# Patient Record
Sex: FEMALE | Race: WHITE | Employment: OTHER | URBAN - METROPOLITAN AREA
[De-identification: names, ages, dates, MRNs, and addresses within clinical notes are randomized per-mention and may not be internally consistent; named-entity substitution may affect disease eponyms.]

---

## 2018-01-09 PROBLEM — K22.719 BARRETT'S ESOPHAGUS WITH DYSPLASIA: Status: ACTIVE | Noted: 2018-01-09

## 2018-01-09 PROBLEM — K21.9 GASTROESOPHAGEAL REFLUX DISEASE WITHOUT ESOPHAGITIS: Status: ACTIVE | Noted: 2017-09-28

## 2018-01-09 PROBLEM — H40.9 GLAUCOMA OF RIGHT EYE: Status: ACTIVE | Noted: 2018-01-09

## 2018-01-09 PROBLEM — H35.30 MACULAR DEGENERATION OF LEFT EYE: Status: ACTIVE | Noted: 2018-01-09

## 2019-04-23 ENCOUNTER — HOSPITAL ENCOUNTER (OUTPATIENT)
Dept: LAB | Age: 84
Discharge: HOME | End: 2019-04-23

## 2019-04-23 PROCEDURE — 88305 TISSUE EXAM BY PATHOLOGIST: CPT

## 2019-04-23 PROCEDURE — 88312 SPECIAL STAINS GROUP 1: CPT

## 2022-01-26 ENCOUNTER — APPOINTMENT (OUTPATIENT)
Dept: GENERAL RADIOLOGY | Age: 87
DRG: 871 | End: 2022-01-26
Attending: STUDENT IN AN ORGANIZED HEALTH CARE EDUCATION/TRAINING PROGRAM
Payer: MEDICARE

## 2022-01-26 ENCOUNTER — APPOINTMENT (OUTPATIENT)
Dept: CT IMAGING | Age: 87
DRG: 871 | End: 2022-01-26
Attending: FAMILY MEDICINE
Payer: MEDICARE

## 2022-01-26 ENCOUNTER — HOSPITAL ENCOUNTER (INPATIENT)
Age: 87
LOS: 21 days | Discharge: HOME HEALTH | DRG: 871 | End: 2022-02-16
Attending: STUDENT IN AN ORGANIZED HEALTH CARE EDUCATION/TRAINING PROGRAM | Admitting: FAMILY MEDICINE
Payer: MEDICARE

## 2022-01-26 DIAGNOSIS — U07.1 ACUTE HYPOXEMIC RESPIRATORY FAILURE DUE TO COVID-19 (HCC): ICD-10-CM

## 2022-01-26 DIAGNOSIS — U07.1 COVID-19: Primary | ICD-10-CM

## 2022-01-26 DIAGNOSIS — R09.02 HYPOXIA: ICD-10-CM

## 2022-01-26 DIAGNOSIS — J96.01 ACUTE HYPOXEMIC RESPIRATORY FAILURE DUE TO COVID-19 (HCC): ICD-10-CM

## 2022-01-26 DIAGNOSIS — I10 ESSENTIAL (PRIMARY) HYPERTENSION: ICD-10-CM

## 2022-01-26 DIAGNOSIS — E78.2 MIXED HYPERLIPIDEMIA: ICD-10-CM

## 2022-01-26 DIAGNOSIS — R77.8 ELEVATED TROPONIN: ICD-10-CM

## 2022-01-26 PROBLEM — R74.01 TRANSAMINITIS: Status: ACTIVE | Noted: 2022-01-26

## 2022-01-26 PROBLEM — E83.42 HYPOMAGNESEMIA: Status: ACTIVE | Noted: 2022-01-26

## 2022-01-26 PROBLEM — Z79.899 CHRONIC PRESCRIPTION BENZODIAZEPINE USE: Status: ACTIVE | Noted: 2022-01-26

## 2022-01-26 PROBLEM — R94.31 PROLONGED Q-T INTERVAL ON ECG: Status: ACTIVE | Noted: 2022-01-26

## 2022-01-26 PROBLEM — G92.8 TOXIC METABOLIC ENCEPHALOPATHY: Status: ACTIVE | Noted: 2022-01-26

## 2022-01-26 PROBLEM — A41.89 SEPSIS DUE TO COVID-19 (HCC): Status: ACTIVE | Noted: 2022-01-26

## 2022-01-26 PROBLEM — E87.6 HYPOKALEMIA: Status: ACTIVE | Noted: 2022-01-26

## 2022-01-26 PROBLEM — D68.69 HYPERCOAGULABLE STATE ASSOCIATED WITH COVID-19 (HCC): Status: ACTIVE | Noted: 2022-01-26

## 2022-01-26 LAB
ALBUMIN SERPL-MCNC: 2.3 G/DL (ref 3.2–4.6)
ALBUMIN/GLOB SERPL: 0.5 {RATIO} (ref 1.2–3.5)
ALP SERPL-CCNC: 67 U/L (ref 50–136)
ALT SERPL-CCNC: 19 U/L (ref 12–65)
ANION GAP SERPL CALC-SCNC: 12 MMOL/L (ref 7–16)
AST SERPL-CCNC: 59 U/L (ref 15–37)
ATRIAL RATE: 116 BPM
BASOPHILS # BLD: 0 K/UL (ref 0–0.2)
BASOPHILS NFR BLD: 0 % (ref 0–2)
BILIRUB SERPL-MCNC: 0.4 MG/DL (ref 0.2–1.1)
BNP SERPL-MCNC: 3481 PG/ML
BUN SERPL-MCNC: 24 MG/DL (ref 8–23)
CALCIUM SERPL-MCNC: 8.3 MG/DL (ref 8.3–10.4)
CALCULATED P AXIS, ECG09: 49 DEGREES
CALCULATED R AXIS, ECG10: -23 DEGREES
CALCULATED T AXIS, ECG11: 46 DEGREES
CHLORIDE SERPL-SCNC: 106 MMOL/L (ref 98–107)
CO2 SERPL-SCNC: 25 MMOL/L (ref 21–32)
COVID-19 RAPID TEST, COVR: DETECTED
CREAT SERPL-MCNC: 0.88 MG/DL (ref 0.6–1)
CRP SERPL-MCNC: 17.3 MG/DL (ref 0–0.9)
CRP SERPL-MCNC: 17.3 MG/DL (ref 0–0.9)
D DIMER PPP FEU-MCNC: 3.85 UG/ML(FEU)
DIAGNOSIS, 93000: NORMAL
DIFFERENTIAL METHOD BLD: ABNORMAL
EOSINOPHIL # BLD: 0 K/UL (ref 0–0.8)
EOSINOPHIL NFR BLD: 0 % (ref 0.5–7.8)
ERYTHROCYTE [DISTWIDTH] IN BLOOD BY AUTOMATED COUNT: 14 % (ref 11.9–14.6)
FERRITIN SERPL-MCNC: 273 NG/ML (ref 8–388)
GLOBULIN SER CALC-MCNC: 4.5 G/DL (ref 2.3–3.5)
GLUCOSE SERPL-MCNC: 128 MG/DL (ref 65–100)
HCT VFR BLD AUTO: 36 % (ref 35.8–46.3)
HGB BLD-MCNC: 11.6 G/DL (ref 11.7–15.4)
IMM GRANULOCYTES # BLD AUTO: 0.1 K/UL (ref 0–0.5)
IMM GRANULOCYTES NFR BLD AUTO: 1 % (ref 0–5)
LACTATE SERPL-SCNC: 2.1 MMOL/L (ref 0.4–2)
LACTATE SERPL-SCNC: 3.5 MMOL/L (ref 0.4–2)
LACTATE SERPL-SCNC: 3.9 MMOL/L (ref 0.4–2)
LDH SERPL L TO P-CCNC: 727 U/L (ref 110–210)
LYMPHOCYTES # BLD: 1.5 K/UL (ref 0.5–4.6)
LYMPHOCYTES NFR BLD: 21 % (ref 13–44)
MAGNESIUM SERPL-MCNC: 1.7 MG/DL (ref 1.8–2.4)
MCH RBC QN AUTO: 28.8 PG (ref 26.1–32.9)
MCHC RBC AUTO-ENTMCNC: 32.2 G/DL (ref 31.4–35)
MCV RBC AUTO: 89.3 FL (ref 79.6–97.8)
MONOCYTES # BLD: 0.8 K/UL (ref 0.1–1.3)
MONOCYTES NFR BLD: 12 % (ref 4–12)
NEUTS SEG # BLD: 4.6 K/UL (ref 1.7–8.2)
NEUTS SEG NFR BLD: 66 % (ref 43–78)
NRBC # BLD: 0 K/UL (ref 0–0.2)
P-R INTERVAL, ECG05: 143 MS
PLATELET # BLD AUTO: 313 K/UL (ref 150–450)
PMV BLD AUTO: 9.4 FL (ref 9.4–12.3)
POTASSIUM SERPL-SCNC: 2.5 MMOL/L (ref 3.5–5.1)
PROCALCITONIN SERPL-MCNC: 6.15 NG/ML (ref 0–0.49)
PROT SERPL-MCNC: 6.8 G/DL (ref 6.3–8.2)
Q-T INTERVAL, ECG07: 355 MS
QRS DURATION, ECG06: 92 MS
QTC CALCULATION (BEZET), ECG08: 491 MS
RBC # BLD AUTO: 4.03 M/UL (ref 4.05–5.2)
SODIUM SERPL-SCNC: 143 MMOL/L (ref 136–145)
SOURCE, COVRS: ABNORMAL
TROPONIN-HIGH SENSITIVITY: 1004.9 PG/ML (ref 0–14)
TROPONIN-HIGH SENSITIVITY: 1334.5 PG/ML (ref 0–14)
TROPONIN-HIGH SENSITIVITY: 1544.2 PG/ML (ref 0–14)
TROPONIN-HIGH SENSITIVITY: 1609.8 PG/ML (ref 0–14)
VENTRICULAR RATE, ECG03: 115 BPM
WBC # BLD AUTO: 7 K/UL (ref 4.3–11.1)

## 2022-01-26 PROCEDURE — 84484 ASSAY OF TROPONIN QUANT: CPT

## 2022-01-26 PROCEDURE — 71260 CT THORAX DX C+: CPT

## 2022-01-26 PROCEDURE — 74011000250 HC RX REV CODE- 250: Performed by: FAMILY MEDICINE

## 2022-01-26 PROCEDURE — 74011000636 HC RX REV CODE- 636: Performed by: FAMILY MEDICINE

## 2022-01-26 PROCEDURE — 85379 FIBRIN DEGRADATION QUANT: CPT

## 2022-01-26 PROCEDURE — 71045 X-RAY EXAM CHEST 1 VIEW: CPT

## 2022-01-26 PROCEDURE — 93005 ELECTROCARDIOGRAM TRACING: CPT | Performed by: STUDENT IN AN ORGANIZED HEALTH CARE EDUCATION/TRAINING PROGRAM

## 2022-01-26 PROCEDURE — 86140 C-REACTIVE PROTEIN: CPT

## 2022-01-26 PROCEDURE — 74011250637 HC RX REV CODE- 250/637: Performed by: STUDENT IN AN ORGANIZED HEALTH CARE EDUCATION/TRAINING PROGRAM

## 2022-01-26 PROCEDURE — 85025 COMPLETE CBC W/AUTO DIFF WBC: CPT

## 2022-01-26 PROCEDURE — 84145 PROCALCITONIN (PCT): CPT

## 2022-01-26 PROCEDURE — 83735 ASSAY OF MAGNESIUM: CPT

## 2022-01-26 PROCEDURE — 83880 ASSAY OF NATRIURETIC PEPTIDE: CPT

## 2022-01-26 PROCEDURE — 65270000029 HC RM PRIVATE

## 2022-01-26 PROCEDURE — 87635 SARS-COV-2 COVID-19 AMP PRB: CPT

## 2022-01-26 PROCEDURE — 93005 ELECTROCARDIOGRAM TRACING: CPT | Performed by: FAMILY MEDICINE

## 2022-01-26 PROCEDURE — 83615 LACTATE (LD) (LDH) ENZYME: CPT

## 2022-01-26 PROCEDURE — 99285 EMERGENCY DEPT VISIT HI MDM: CPT

## 2022-01-26 PROCEDURE — 94762 N-INVAS EAR/PLS OXIMTRY CONT: CPT

## 2022-01-26 PROCEDURE — 80053 COMPREHEN METABOLIC PANEL: CPT

## 2022-01-26 PROCEDURE — 74011000258 HC RX REV CODE- 258: Performed by: FAMILY MEDICINE

## 2022-01-26 PROCEDURE — 74011250637 HC RX REV CODE- 250/637: Performed by: FAMILY MEDICINE

## 2022-01-26 PROCEDURE — 86580 TB INTRADERMAL TEST: CPT | Performed by: FAMILY MEDICINE

## 2022-01-26 PROCEDURE — 82728 ASSAY OF FERRITIN: CPT

## 2022-01-26 PROCEDURE — 74011000250 HC RX REV CODE- 250: Performed by: STUDENT IN AN ORGANIZED HEALTH CARE EDUCATION/TRAINING PROGRAM

## 2022-01-26 PROCEDURE — 74011250636 HC RX REV CODE- 250/636: Performed by: FAMILY MEDICINE

## 2022-01-26 PROCEDURE — 83605 ASSAY OF LACTIC ACID: CPT

## 2022-01-26 PROCEDURE — XW033H5 INTRODUCTION OF TOCILIZUMAB INTO PERIPHERAL VEIN, PERCUTANEOUS APPROACH, NEW TECHNOLOGY GROUP 5: ICD-10-PCS | Performed by: FAMILY MEDICINE

## 2022-01-26 PROCEDURE — 74011250636 HC RX REV CODE- 250/636: Performed by: STUDENT IN AN ORGANIZED HEALTH CARE EDUCATION/TRAINING PROGRAM

## 2022-01-26 RX ORDER — POTASSIUM CHLORIDE 20 MEQ/1
40 TABLET, EXTENDED RELEASE ORAL EVERY 6 HOURS
Status: DISPENSED | OUTPATIENT
Start: 2022-01-26 | End: 2022-01-27

## 2022-01-26 RX ORDER — POLYETHYLENE GLYCOL 3350 17 G/17G
17 POWDER, FOR SOLUTION ORAL DAILY PRN
Status: DISCONTINUED | OUTPATIENT
Start: 2022-01-26 | End: 2022-02-16 | Stop reason: HOSPADM

## 2022-01-26 RX ORDER — HEPARIN SODIUM 5000 [USP'U]/ML
5000 INJECTION, SOLUTION INTRAVENOUS; SUBCUTANEOUS EVERY 8 HOURS
Status: DISCONTINUED | OUTPATIENT
Start: 2022-01-26 | End: 2022-01-29

## 2022-01-26 RX ORDER — MELATONIN
2000 DAILY
Status: DISCONTINUED | OUTPATIENT
Start: 2022-01-27 | End: 2022-02-16 | Stop reason: HOSPADM

## 2022-01-26 RX ORDER — ONDANSETRON 4 MG/1
4 TABLET, ORALLY DISINTEGRATING ORAL
Status: DISCONTINUED | OUTPATIENT
Start: 2022-01-26 | End: 2022-02-16 | Stop reason: HOSPADM

## 2022-01-26 RX ORDER — PANTOPRAZOLE SODIUM 40 MG/1
40 TABLET, DELAYED RELEASE ORAL
Status: DISCONTINUED | OUTPATIENT
Start: 2022-01-27 | End: 2022-01-27

## 2022-01-26 RX ORDER — SODIUM CHLORIDE 0.9 % (FLUSH) 0.9 %
5-10 SYRINGE (ML) INJECTION EVERY 8 HOURS
Status: DISCONTINUED | OUTPATIENT
Start: 2022-01-26 | End: 2022-02-16 | Stop reason: HOSPADM

## 2022-01-26 RX ORDER — ACETAMINOPHEN 500 MG
1000 TABLET ORAL
Status: COMPLETED | OUTPATIENT
Start: 2022-01-26 | End: 2022-01-26

## 2022-01-26 RX ORDER — ATORVASTATIN CALCIUM 40 MG/1
40 TABLET, FILM COATED ORAL
Status: DISCONTINUED | OUTPATIENT
Start: 2022-01-26 | End: 2022-02-16 | Stop reason: HOSPADM

## 2022-01-26 RX ORDER — GUAIFENESIN/DEXTROMETHORPHAN 100-10MG/5
5 SYRUP ORAL
Status: DISCONTINUED | OUTPATIENT
Start: 2022-01-26 | End: 2022-02-16 | Stop reason: HOSPADM

## 2022-01-26 RX ORDER — ONDANSETRON 2 MG/ML
4 INJECTION INTRAMUSCULAR; INTRAVENOUS
Status: DISCONTINUED | OUTPATIENT
Start: 2022-01-26 | End: 2022-02-16 | Stop reason: HOSPADM

## 2022-01-26 RX ORDER — GABAPENTIN 300 MG/1
300 CAPSULE ORAL
Status: DISCONTINUED | OUTPATIENT
Start: 2022-01-26 | End: 2022-02-06

## 2022-01-26 RX ORDER — SODIUM CHLORIDE 0.9 % (FLUSH) 0.9 %
5-40 SYRINGE (ML) INJECTION EVERY 8 HOURS
Status: DISCONTINUED | OUTPATIENT
Start: 2022-01-26 | End: 2022-02-16 | Stop reason: HOSPADM

## 2022-01-26 RX ORDER — SODIUM CHLORIDE 0.9 % (FLUSH) 0.9 %
10 SYRINGE (ML) INJECTION
Status: COMPLETED | OUTPATIENT
Start: 2022-01-26 | End: 2022-01-26

## 2022-01-26 RX ORDER — MAGNESIUM SULFATE HEPTAHYDRATE 40 MG/ML
2 INJECTION, SOLUTION INTRAVENOUS ONCE
Status: COMPLETED | OUTPATIENT
Start: 2022-01-26 | End: 2022-01-26

## 2022-01-26 RX ORDER — DEXAMETHASONE SODIUM PHOSPHATE 100 MG/10ML
6 INJECTION INTRAMUSCULAR; INTRAVENOUS EVERY 24 HOURS
Status: DISCONTINUED | OUTPATIENT
Start: 2022-01-27 | End: 2022-02-03

## 2022-01-26 RX ORDER — ACETAMINOPHEN 650 MG/1
650 SUPPOSITORY RECTAL
Status: DISCONTINUED | OUTPATIENT
Start: 2022-01-26 | End: 2022-02-16 | Stop reason: HOSPADM

## 2022-01-26 RX ORDER — GUAIFENESIN 100 MG/5ML
325 LIQUID (ML) ORAL
Status: COMPLETED | OUTPATIENT
Start: 2022-01-26 | End: 2022-01-26

## 2022-01-26 RX ORDER — SODIUM CHLORIDE 0.9 % (FLUSH) 0.9 %
5-10 SYRINGE (ML) INJECTION AS NEEDED
Status: DISCONTINUED | OUTPATIENT
Start: 2022-01-26 | End: 2022-02-16 | Stop reason: HOSPADM

## 2022-01-26 RX ORDER — ALBUTEROL SULFATE 90 UG/1
2 AEROSOL, METERED RESPIRATORY (INHALATION)
Status: DISCONTINUED | OUTPATIENT
Start: 2022-01-26 | End: 2022-02-16 | Stop reason: HOSPADM

## 2022-01-26 RX ORDER — ACETAMINOPHEN 325 MG/1
650 TABLET ORAL
Status: DISCONTINUED | OUTPATIENT
Start: 2022-01-26 | End: 2022-02-16 | Stop reason: HOSPADM

## 2022-01-26 RX ORDER — SODIUM CHLORIDE 0.9 % (FLUSH) 0.9 %
5-40 SYRINGE (ML) INJECTION AS NEEDED
Status: DISCONTINUED | OUTPATIENT
Start: 2022-01-26 | End: 2022-02-16 | Stop reason: HOSPADM

## 2022-01-26 RX ORDER — TEMAZEPAM 15 MG/1
15 CAPSULE ORAL
Status: DISCONTINUED | OUTPATIENT
Start: 2022-01-26 | End: 2022-02-09

## 2022-01-26 RX ORDER — MAG HYDROX/ALUMINUM HYD/SIMETH 200-200-20
15 SUSPENSION, ORAL (FINAL DOSE FORM) ORAL
Status: DISCONTINUED | OUTPATIENT
Start: 2022-01-26 | End: 2022-02-16 | Stop reason: HOSPADM

## 2022-01-26 RX ORDER — DEXAMETHASONE SODIUM PHOSPHATE 100 MG/10ML
10 INJECTION INTRAMUSCULAR; INTRAVENOUS
Status: COMPLETED | OUTPATIENT
Start: 2022-01-26 | End: 2022-01-26

## 2022-01-26 RX ADMIN — TOCILIZUMAB 600 MG: 20 INJECTION, SOLUTION, CONCENTRATE INTRAVENOUS at 21:47

## 2022-01-26 RX ADMIN — Medication 10 ML: at 19:08

## 2022-01-26 RX ADMIN — HEPARIN SODIUM 5000 UNITS: 5000 INJECTION INTRAVENOUS; SUBCUTANEOUS at 21:47

## 2022-01-26 RX ADMIN — SODIUM CHLORIDE, PRESERVATIVE FREE 5 ML: 5 INJECTION INTRAVENOUS at 14:03

## 2022-01-26 RX ADMIN — POTASSIUM CHLORIDE 40 MEQ: 20 TABLET, EXTENDED RELEASE ORAL at 17:45

## 2022-01-26 RX ADMIN — ASPIRIN 324 MG: 81 TABLET, CHEWABLE ORAL at 18:11

## 2022-01-26 RX ADMIN — ACETAMINOPHEN 1000 MG: 500 TABLET ORAL at 14:02

## 2022-01-26 RX ADMIN — ATORVASTATIN CALCIUM 40 MG: 40 TABLET, FILM COATED ORAL at 21:48

## 2022-01-26 RX ADMIN — DEXAMETHASONE SODIUM PHOSPHATE 10 MG: 10 INJECTION INTRAMUSCULAR; INTRAVENOUS at 14:03

## 2022-01-26 RX ADMIN — SODIUM CHLORIDE, PRESERVATIVE FREE 10 ML: 5 INJECTION INTRAVENOUS at 21:48

## 2022-01-26 RX ADMIN — SODIUM CHLORIDE 100 ML: 900 INJECTION, SOLUTION INTRAVENOUS at 19:08

## 2022-01-26 RX ADMIN — SODIUM CHLORIDE, SODIUM LACTATE, POTASSIUM CHLORIDE, AND CALCIUM CHLORIDE 1000 ML: 600; 310; 30; 20 INJECTION, SOLUTION INTRAVENOUS at 18:21

## 2022-01-26 RX ADMIN — SODIUM CHLORIDE, PRESERVATIVE FREE 10 ML: 5 INJECTION INTRAVENOUS at 21:50

## 2022-01-26 RX ADMIN — SODIUM CHLORIDE, SODIUM LACTATE, POTASSIUM CHLORIDE, AND CALCIUM CHLORIDE 1000 ML: 600; 310; 30; 20 INJECTION, SOLUTION INTRAVENOUS at 16:50

## 2022-01-26 RX ADMIN — MAGNESIUM SULFATE HEPTAHYDRATE 2 G: 40 INJECTION, SOLUTION INTRAVENOUS at 17:45

## 2022-01-26 RX ADMIN — IOPAMIDOL 100 ML: 755 INJECTION, SOLUTION INTRAVENOUS at 19:08

## 2022-01-26 RX ADMIN — TUBERCULIN PURIFIED PROTEIN DERIVATIVE 5 UNITS: 5 INJECTION INTRADERMAL at 17:05

## 2022-01-26 NOTE — H&P
Hospitalist History and Physical   Admit Date:  2022 12:32 PM   Name:  Erum Johnson   Age:  80 y.o. Sex:  female  :  1935   MRN:  [id number]   Room:  ER/    Presenting Complaint: Positive For Covid-19    Reason(s) for Admission: Acute hypoxemic respiratory failure due to COVID-19 ([facility] [U07.1, J96.01]     History of Present Illness:   Erum Johnson is a 80 y.o. female with medical history of HTN, mixed HLD, GERD, Glaucoma, Hay fever,  HLD, insomnia who presented from home via EMS with hypoxia and AMS with recent diagnosis of COVID-19. EMS reports patient was hypoxic on arrival, O2 saturation in the 60s. Placed on 6 L and given terbutaline. EMS also noted family some concern for patient being more altered than normal.    In ED, T102 BP 91/56    spo2 74% RA, 85% 6L NC, 91% 55L/m  LA 3.9 CRP 17.3 D dimer 3.85 NT pro BNP 3481 HS trop 0051>6917   CXR BL infiltrates   rec'd Tylenol 1000 mg and decadron 10 mg IV     EKG shows sinus tachycardia, rate 115, , QRS 92, QTc 491    She is alert and oriented to person, place  but not month or year. Unable to get in touch with either son or . She notes symptom onset 5 days ago. Patient denies CP or palpitations, nausea, vomiting, diarrhea, fevers. +chills, +cough. Says  is sick. Discussed Actemra with patient including risks and benefits. satting 94% on 55/90% airvo. Review of Systems:  Limited due to AMS   Assessment & Plan:     Acute Hypoxemic Respiratory Failure 2/2 COVID-19 PNA   Suspected Cytokine Storm with elevated markers   - COVID +    - Unvaccinated   - Decadron 6 mg x 10 doses   - Consult pharm for Actemra  (CRP 17.3)  - Charted O2 Flow Rate (L/min): 55 l/min. Wean as able  - Awake proning as tolerated, anti-tussive PRN, Bronchodilators     Sepsis 2/2 COVID - admin 30 cc/kg bolus. Trend LA. At risk of third spacing with sepsis, hypoalbuminemia. Monitor for worsening respiratory status. No known hx CHF. Clinically dry appearing. 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 Check Procal. Superimposed bacterial component not suspected at this time. Elevated Troponin - repeat HS trop increased 1600s. Repeat EKG. Admin  mg x 1 doses. Repeat Trop in 3 hours. Consult Cardiology. Suspected demand ischemia, sepsis induced cardiomyopathy, VTE, NSTEMI denies typical symptoms, although some confusion. Hypercoagulable state associated with COVID-19 - check CTPE now. Hypokalemia // Hypomagnesemia - associated with prolonged QTc   Kcl 40 q6h   Mag 2 g IV   Avoid QT prolonging agents at this time. At risk for torsades   Repeat labs in AM     Acute Encephalopathy - multifactorial etiology including Toxic, Metabolic and hypoxia. No focal findings. Supportive care. Delirium precautions. Adjust sedative meds until improved. Chronic insomnia - takes temazepam 30 mg nightly for this. At risk of withdrawal. Reduce dose to 15 mg as needed. Lumbar spondylosis - f/b pain management. Takes gabapentin 800 mg BID, tramadol 100 mg BID. Reduce gabapentin 300 mg TID prn, hold tramadol for now. HLD - start atorvastatin     H/o HTN - BP low. Monitor     GERD - resume protonix. There is a high probability of acute organ impairment or life-threatening deterioration in the patient's condition from: Sepsis, acute respiratory failure on HFNC, VTE, severe electrolyte abnormalities     Total critical care time spent: 45 minutes. Time is indicative of direct patient attendance at bedside and on the patient's floor nearby. Includes time spent at bedside performing history and exam, performing chart review, discussing findings and treatment plan with patient and/or family, discussing patient with consultants and colleagues, ordering and reviewing pertinent laboratory and radiographic evaluations, and discussing patient with nursing staff. Time excludes procedures.       Dispo/Discharge Planning: Admit remote telemetry   Diet: ADULT DIET Easy to Chew; Low Fat/Low Chol/High Fiber/EVA  VTE ppx: heparin pending CT results   Code status: Full Code    Hospital Problems as of 1/26/2022 Date Reviewed: 4/12/2018          Codes Class Noted - Resolved POA    * (Principal) Acute hypoxemic respiratory failure due to COVID-19 [facility]) ICD-10-CM: U07.1, J96.01  ICD-9-CM: 518.81, 079.89, 799.02  1/26/2022 - Present Yes        Sepsis due to COVID-19 [facility]) ICD-10-CM: U07.1, A41.89  ICD-9-CM: 079.89, 995.91  1/26/2022 - Present Yes        Hypomagnesemia ICD-10-CM: E83.42  ICD-9-CM: 275.2  1/26/2022 - Present Yes        Hypokalemia ICD-10-CM: E87.6  ICD-9-CM: 276.8  1/26/2022 - Present Yes        Prolonged Q-T interval on ECG ICD-10-CM: R94.31  ICD-9-CM: 794.31  1/26/2022 - Present Yes        Elevated troponin ICD-10-CM: R77.8  ICD-9-CM: 790.6  1/26/2022 - Present Yes        Toxic metabolic encephalopathy OSR-28-TD: G92.8  ICD-9-CM: 349.82  1/26/2022 - Present Yes        Chronic prescription benzodiazepine use ICD-10-CM: Z79.899  ICD-9-CM: V58.69  1/26/2022 - Present Yes        Hypercoagulable state associated with COVID-19 ([address] ICD-10-CM: U07.1, D68.69  ICD-9-CM: 289.82, 079.89  1/26/2022 - Present Yes        Transaminitis ICD-10-CM: R74.01  ICD-9-CM: 790.4  1/26/2022 - Present Yes        Gastroesophageal reflux disease without esophagitis ICD-10-CM: K21.9  ICD-9-CM: 530.81  9/28/2017 - Present Yes    Overview Signed 1/9/2018 10:12 AM by Koko Castellanos DO     Last Assessment & Plan:   Stable. Essential (primary) hypertension ICD-10-CM: I10  ICD-9-CM: 401.9  10/28/2016 - Present Yes    Overview Signed 1/9/2018 10:12 AM by Koko Castellanos DO     Last Assessment & Plan:   Stable. Hyperlipidemia ICD-10-CM: E78.5  ICD-9-CM: 272.4  10/28/2016 - Present Yes    Overview Signed 1/9/2018 10:12 AM by Kkoo Castellanos DO     Last Assessment & Plan:   Return for fasting labs.              Insomnia, persistent ICD-10-CM: G47.00  ICD-9-CM: 307.42  4/27/2016 - Present Yes    Overview Signed 1/9/2018 10:12 AM by Stas Guerin DO     Last Assessment & Plan:   Restoril prescription printed & given. Primary osteoarthritis involving multiple joints ICD-10-CM: M89.49  ICD-9-CM: 715.98  7/31/2015 - Present Yes    Overview Signed 1/9/2018 10:12 AM by Stas Guerin DO     Last Assessment & Plan:   3 Norco scripts printed with do not fill before dates of 10/10, 11/9, 12/9. Tramadol prescription printed & given (decreased from 150 to 120 tablets per month based on how patient is taking it). Aware of risk of sedation and aware to get prescriptions only in our office. Discussed polypharmacy & fall risk with patient's multiple sedating medications, encouraged to limit use as much as possible. Will have her see Dr. Ben Bean for next follow up to review pain medication regimen. Pt verbalized understanding, agreed with plan.                    Past History:  Past Medical History:   Diagnosis Date    Arthritis     osteo    Merchant esophagus     Chronic pain     left knee, back and neck    GERD (gastroesophageal reflux disease)     Glaucoma     Hyperlipidemia     Hypertension     Macular degeneration      Past Surgical History:   Procedure Laterality Date    HX CATARACT REMOVAL      bilateral   with lens implants    HX CERVICAL FUSION  2002    HX ENDOSCOPY  02/13/2018    EGD-ulcer, gastritis    HX HEENT  1958    nasal fx from [address]    septoplasy    HX HYSTERECTOMY  1961    HX LUMBAR FUSION  2009    HX ORTHOPAEDIC  2013    lt knee replacement    HX TONSILLECTOMY        Allergies   Allergen Reactions    Sulfa (Sulfonamide Antibiotics) Rash      Social History     Tobacco Use    Smoking status: Never Smoker    Smokeless tobacco: Never Used   Substance Use Topics    Alcohol use: No      Family History   Problem Relation Age of Onset    Stroke Father     Lung Disease Sister  Stroke Brother     Breast Cancer Neg Hx       Family history reviewed and negative except as noted above.     Immunization History   Administered Date(s) Administered    Influenza High Dose Vaccine PF 12/04/2012, 10/28/2016    Influenza Vaccine 10/06/2008    Influenza Vaccine (Quad) PF (>6 Mo Flulaval, Fluarix, and >3 Yrs Afluria, Fluzone 86117) 10/28/2015    Pneumococcal Conjugate (PCV-13) 10/28/2015    Pneumococcal Polysaccharide (PPSV-23) 09/28/2017    TB Skin Test (PPD) Intradermal 07/08/2008, 07/13/2013     Prior to Admit Medications:  Current Outpatient Medications   Medication Instructions    Alpha Lipoic Acid 600 mg cap DAILY    aspirin delayed-release 81 mg tablet DAILY    b complex vitamins tablet 1 Tablet, Oral, DAILY    baclofen (LIORESAL) 10 mg, Oral, BEDTIME PRN    calcium-cholecalciferol, d3, (CALCIUM 600 + D) 600-125 mg-unit tab Oral    cinnamon bark (CINNAMON) 500 mg, Oral, 2 TIMES DAILY    coenzyme q10-vitamin e (COQ10 ) 100-100 mg-unit cap DAILY    doxepin (SINEQUAN) 10 mg, Oral, EVERY BEDTIME    fish oil-omega-3 fatty acids (FISH OIL) 340-1,000 mg capsule 1 Capsule, Oral, DAILY    gabapentin (NEURONTIN) 800 mg, Oral, 2 TIMES DAILY    garlic 3,500 mg cap Oral    ginkgo biloba (GINKGO) 120 mg, Oral, DAILY    multivitamin (ONE A DAY) tablet 1 Tablet, DAILY    NIFEdipine ER (PROCARDIA XL) 30 mg, Oral, DAILY    OTHER,NON-FORMULARY, Indications: hair/nail supplement    OTHER,NON-FORMULARY, Indications: Brain health    pantoprazole (PROTONIX) 40 mg, Oral, DAILY BEFORE BREAKFAST    potassium 99 mg, DAILY    pyridoxine (vitamin B6) (VITAMIN B-6) 500 mg, DAILY    simvastatin (ZOCOR) 20 mg, Oral, EVERY BEDTIME    sodium chloride (SALINEX) 0.4 % nasal spray 1 Spray, AS NEEDED    suvorexant (BELSOMRA) 20 mg, Oral, BEDTIME PRN    timolol (TIMOPTIC) 0.5 % ophthalmic solution 1 Drop, DAILY    traMADoL (ULTRAM) 100 mg, Oral, EVERY 8 HOURS AS NEEDED    triamterene-hydroCHLOROthiazide (MAXZIDE-25MG) 37.5-25 mg per tablet 1 Tablet, Oral, DAILY       Objective:     Patient Vitals for the past 24 hrs:   Temp Pulse Resp BP SpO2   01/26/22 1800 -- 92 27 112/70 91 %   01/26/22 1745 -- 93 -- 109/78 --   01/26/22 1701 -- -- -- -- 94 %   01/26/22 1530 -- 98 24 104/61 93 %   01/26/22 1505 97.8 °F (36.6 °C) -- -- -- --   01/26/22 1501 -- (!) 101 24 106/63 94 %   01/26/22 1430 -- (!) 101 27 (!) 91/56 95 %   01/26/22 1429 -- 100 26 -- 95 %   01/26/22 1423 -- 100 17 -- 96 %   01/26/22 1415 -- (!) 104 15 107/65 92 %   01/26/22 1400 -- (!) 105 25 98/62 95 %   01/26/22 1306 -- -- -- -- 91 %   01/26/22 1249 -- -- -- -- 95 %   01/26/22 1242 (!) 102 °F (38.9 °C) -- 28 125/69 (!) 85 %   01/26/22 1236 -- -- -- 125/69 (!) 74 %     Oxygen Therapy  O2 Sat (%): 91 % (01/26/22 1800)  Pulse via Oximetry: 91 beats per minute (01/26/22 1800)  O2 Device: Heated; Hi flow nasal cannula (01/26/22 1701)  O2 Flow Rate (L/min): 55 l/min (01/26/22 1701)  O2 Temperature: 87.8 °F (31 °C) (01/26/22 1701)  FIO2 (%): 90 % (01/26/22 1701)    Estimated body mass index is 27.78 kg/m² as calculated from the following:    Height as of this encounter: 5' 1\" (1.549 m). Weight as of this encounter: 66.7 kg (147 lb). No intake or output data in the 24 hours ending 01/26/22 1819      Physical Exam:    Blood pressure 112/70, pulse 92, temperature 97.8 °F (36.6 °C), resp. rate 27, height 5' 1\" (1.549 m), weight 66.7 kg (147 lb), SpO2 91 %. Physical Exam  Vitals and nursing note reviewed. Constitutional:       Appearance: She is ill-appearing. HENT:      Head: Normocephalic and atraumatic. Nose: No rhinorrhea. Mouth/Throat:      Mouth: Mucous membranes are dry. Eyes:      General: No scleral icterus. Conjunctiva/sclera: Conjunctivae normal.      Comments: +cataracts    Cardiovascular:      Rate and Rhythm: Normal rate and regular rhythm. Heart sounds: Murmur heard.        Pulmonary: Comments: Increased WOB; no accessory muscle use  Diminished breath sounds throughout   No crackles   Equal chest rise   Abdominal:      General: There is no distension. Palpations: Abdomen is soft. Tenderness: There is no abdominal tenderness. There is no guarding. Musculoskeletal:         General: Deformity (enlarged MCPs ) present. Cervical back: Neck supple. Right lower leg: No edema. Left lower leg: No edema. Skin:     General: Skin is dry. Capillary Refill: Capillary refill takes 2 to 3 seconds. Coloration: Skin is pale. Neurological:      General: No focal deficit present. Mental Status: She is alert. She is disoriented.       Comments: Oriented to person place and situation          I have reviewed ordered lab tests and independently visualized imaging below:    Last 24hr Labs:  Recent Results (from the past 24 hour(s))   EKG, 12 LEAD, INITIAL    Collection Time: 01/26/22 12:51 PM   Result Value Ref Range    Ventricular Rate 115 BPM    Atrial Rate 116 BPM    P-R Interval 143 ms    QRS Duration 92 ms    Q-T Interval 355 ms    QTC Calculation (Bezet) 491 ms    Calculated P Axis 49 degrees    Calculated R Axis -23 degrees    Calculated T Axis 46 degrees    Diagnosis       Sinus tachycardia  Borderline left axis deviation  Low voltage, precordial leads  Abnormal R-wave progression, late transition  Borderline repol abnrm, anterolateral leads  Borderline prolonged QT interval    Confirmed by Kat Clay MD (), JACKELINE LOVE ([id number]) on 1/26/2022 3:24:35 PM     CBC WITH AUTOMATED DIFF    Collection Time: 01/26/22 12:56 PM   Result Value Ref Range    WBC 7.0 4.3 - 11.1 K/uL    RBC 4.03 (L) 4.05 - 5.2 M/uL    HGB 11.6 (L) 11.7 - 15.4 g/dL    HCT 36.0 35.8 - 46.3 %    MCV 89.3 79.6 - 97.8 FL    MCH 28.8 26.1 - 32.9 PG    MCHC 32.2 31.4 - 35.0 g/dL    RDW 14.0 11.9 - 14.6 %    PLATELET 073 532 - 617 K/uL    MPV 9.4 9.4 - 12.3 FL    ABSOLUTE NRBC 0.00 0.0 - 0.2 K/uL    DF AUTOMATED NEUTROPHILS 66 43 - 78 %    LYMPHOCYTES 21 13 - 44 %    MONOCYTES 12 4.0 - 12.0 %    EOSINOPHILS 0 (L) 0.5 - 7.8 %    BASOPHILS 0 0.0 - 2.0 %    IMMATURE GRANULOCYTES 1 0.0 - 5.0 %    ABS. NEUTROPHILS 4.6 1.7 - 8.2 K/UL    ABS. LYMPHOCYTES 1.5 0.5 - 4.6 K/UL    ABS. MONOCYTES 0.8 0.1 - 1.3 K/UL    ABS. EOSINOPHILS 0.0 0.0 - 0.8 K/UL    ABS. BASOPHILS 0.0 0.0 - 0.2 K/UL    ABS. IMM. GRANS. 0.1 0.0 - 0.5 K/UL   METABOLIC PANEL, COMPREHENSIVE    Collection Time: 01/26/22 12:56 PM   Result Value Ref Range    Sodium 143 136 - 145 mmol/L    Potassium 2.5 (L) 3.5 - 5.1 mmol/L    Chloride 106 98 - 107 mmol/L    CO2 25 21 - 32 mmol/L    Anion gap 12 7 - 16 mmol/L    Glucose 128 (H) 65 - 100 mg/dL    BUN 24 (H) 8 - 23 MG/DL    Creatinine 0.88 0.6 - 1.0 MG/DL    GFR est AA >60 >60 ml/min/1.73m2    GFR est non-AA >60 >60 ml/min/1.73m2    Calcium 8.3 8.3 - 10.4 MG/DL    Bilirubin, total 0.4 0.2 - 1.1 MG/DL    ALT (SGPT) 19 12 - 65 U/L    AST (SGOT) 59 (H) 15 - 37 U/L    Alk.  phosphatase 67 50 - 136 U/L    Protein, total 6.8 6.3 - 8.2 g/dL    Albumin 2.3 (L) 3.2 - 4.6 g/dL    Globulin 4.5 (H) 2.3 - 3.5 g/dL    A-G Ratio 0.5 (L) 1.2 - 3.5     MAGNESIUM    Collection Time: 01/26/22 12:56 PM   Result Value Ref Range    Magnesium 1.7 (L) 1.8 - 2.4 mg/dL   TROPONIN-HIGH SENSITIVITY    Collection Time: 01/26/22 12:56 PM   Result Value Ref Range    Troponin-High Sensitivity 1,544.2 (HH) 0 - 14 pg/mL   NT-PRO BNP    Collection Time: 01/26/22 12:58 PM   Result Value Ref Range    NT pro-BNP 3,481 (H) <450 PG/ML   COVID-19 RAPID TEST    Collection Time: 01/26/22 12:58 PM   Result Value Ref Range    Specimen source NASAL      COVID-19 rapid test Detected (AA) NOTD     LACTIC ACID    Collection Time: 01/26/22 12:58 PM   Result Value Ref Range    Lactic acid 3.9 (HH) 0.4 - 2.0 MMOL/L   C REACTIVE PROTEIN, QT    Collection Time: 01/26/22 12:58 PM   Result Value Ref Range    C-Reactive protein 17.3 (H) 0.0 - 0.9 mg/dL   C REACTIVE PROTEIN, QT Collection Time: 01/26/22  1:00 PM   Result Value Ref Range    C-Reactive protein 17.3 (H) 0.0 - 0.9 mg/dL   D DIMER    Collection Time: 01/26/22  1:00 PM   Result Value Ref Range    D DIMER 3.85 (H) <0.56 ug/ml(FEU)   TROPONIN-HIGH SENSITIVITY    Collection Time: 01/26/22  3:04 PM   Result Value Ref Range    Troponin-High Sensitivity 1,609.8 (HH) 0 - 14 pg/mL   LACTIC ACID    Collection Time: 01/26/22  3:04 PM   Result Value Ref Range    Lactic acid 3.5 (H) 0.4 - 2.0 MMOL/L       All Micro Results     Procedure Component Value Units Date/Time    COVID-19 RAPID TEST [[id number]]  (Abnormal) Collected: 01/26/22 1258    Order Status: Completed Specimen: Nasopharyngeal Updated: 01/26/22 1316     Specimen source NASAL        COVID-19 rapid test Detected        Comment:      The specimen is POSITIVE for SARS-CoV-2, the novel coronavirus associated with COVID-19. This test has been authorized by the FDA under an Emergency Use Authorization (EUA) for use by authorized laboratories. Fact sheet for Healthcare Providers: ConventionUpdate.co.nz  Fact sheet for Patients: ConventionUpdate.co.nz       Methodology: Isothermal Nucleic Acid Amplification               Other Studies:  XR CHEST PORT    Result Date: 1/26/2022  EXAMINATION: CHEST RADIOGRAPH 1/26/2022 1:05 PM ACCESSION NUMBER: [id number] INDICATION: Shortness of Breath COMPARISON: Chest x-ray 7/8/2008 TECHNIQUE: A single view of the chest was obtained. FINDINGS: Support Lines and Tubes: None Cardiac Silhouette: Within normal limits in size. Lungs: Bilateral interstitial and alveolar pulmonary opacities. Pleura: No pleural effusion. No pneumothorax. Osseous Structures: Thoracic spine spondylosis. Upper Abdomen: Unremarkable. Bilateral interstitial and alveolar pulmonary opacities. Differential considerations include atypical pneumonia and/or pulmonary edema.        Medications Administered     acetaminophen (TYLENOL) tablet 1,000 mg     Admin Date  01/26/2022 Action  Given Dose  1,000 mg Route  Oral Administered By  Armando De León RN          dexamethasone (DECADRON) 10 mg/mL injection 10 mg     Admin Date  01/26/2022 Action  Given Dose  10 mg Route  IntraVENous Administered By  Armando De León RN          sodium chloride (NS) flush 5-10 mL     Admin Date  01/26/2022 Action  Given Dose  5 mL Route  IntraVENous Administered By  Armando De León RN                Signed:  Mahala Sicard, DO    Part of this note may have been written by using a voice dictation software. The note has been proof read but may still contain some grammatical/other typographical errors.

## 2022-01-26 NOTE — ED PROVIDER NOTES
30-year-old female presents to the emergency department via EMS secondary to patient being hypoxic at home. Patient reports she was recent diagnosed with COVID-19, states she is unvaccinated. Endorses diarrhea, denies vomiting. Has had intermittent fever and chills as well as body aches. Nonproductive cough. Patient denies chest or back pain. Denies swelling in lower extremities. EMS reports patient was hypoxic on arrival, O2 saturation in the 60s. Placed on 6 L and given terbutaline. Patient denies history of lung issues.   EMS reported family some concern for patient being more altered than normal.           Past Medical History:   Diagnosis Date    Arthritis     osteo    Merchant esophagus     Chronic pain     left knee, back and neck    GERD (gastroesophageal reflux disease)     Glaucoma     Hyperlipidemia     Hypertension     Macular degeneration        Past Surgical History:   Procedure Laterality Date    HX CATARACT REMOVAL      bilateral   with lens implants    HX CERVICAL FUSION  2002    HX ENDOSCOPY  02/13/2018    EGD-ulcer, gastritis    HX HEENT  1958    nasal fx from [address]    septoplasy    HX HYSTERECTOMY  1961    HX LUMBAR FUSION  2009    HX ORTHOPAEDIC  2013    lt knee replacement    HX TONSILLECTOMY           Family History:   Problem Relation Age of Onset    Stroke Father     Lung Disease Sister     Stroke Brother     Breast Cancer Neg Hx        Social History     Socioeconomic History    Marital status:      Spouse name: Not on file    Number of children: Not on file    Years of education: Not on file    Highest education level: Not on file   Occupational History    Not on file   Tobacco Use    Smoking status: Never Smoker    Smokeless tobacco: Never Used   Substance and Sexual Activity    Alcohol use: No    Drug use: No    Sexual activity: Not on file   Other Topics Concern    Not on file   Social History Narrative    Not on file Social Determinants of Health     Financial Resource Strain:     Difficulty of Paying Living Expenses: Not on file   Food Insecurity:     Worried About Running Out of Food in the Last Year: Not on file    [place] of Food in the Last Year: Not on file   Transportation Needs:     Lack of Transportation (Medical): Not on file    Lack of Transportation (Non-Medical): Not on file   Physical Activity:     Days of Exercise per Week: Not on file    Minutes of Exercise per Session: Not on file   Stress:     Feeling of Stress : Not on file   Social Connections:     Frequency of Communication with Friends and Family: Not on file    Frequency of Social Gatherings with Friends and Family: Not on file    Attends Denominational Services: Not on file    Active Member of [address] [place] [address] [employer] or Organizations: Not on file    Attends Club or Organization Meetings: Not on file    Marital Status: Not on file   Intimate Partner Violence:     Fear of Current or Ex-Partner: Not on file    Emotionally Abused: Not on file    Physically Abused: Not on file    Sexually Abused: Not on file   Housing Stability:     Unable to Pay for Housing in the Last Year: Not on file    Number of [place] in the Last Year: Not on file    Unstable Housing in the Last Year: Not on file         ALLERGIES: Sulfa (sulfonamide antibiotics)    Review of Systems   Constitutional: Positive for chills and fever. HENT: Negative for sinus pressure and sore throat. Eyes: Negative for visual disturbance. Respiratory: Positive for cough and shortness of breath. Cardiovascular: Negative for chest pain. Gastrointestinal: Positive for diarrhea. Negative for abdominal pain, nausea and vomiting. Endocrine: Negative for polyuria. Genitourinary: Negative for difficulty urinating and dysuria. Musculoskeletal: Negative for neck pain and neck stiffness. Skin: Negative for rash. Neurological: Negative for weakness and headaches.    Psychiatric/Behavioral: Negative for confusion. All other systems reviewed and are negative. Vitals:    01/26/22 1242 01/26/22 1249 01/26/22 1306   BP: 125/69     Resp: 28     Temp: (!) 102 °F (38.9 °C)     SpO2: (!) 85% 95% 91%   Weight: 66.7 kg (147 lb)     Height: 5' 1\" (1.549 m)              Physical Exam  Vitals and nursing note reviewed. Constitutional:       Appearance: Normal appearance. She is not ill-appearing or toxic-appearing. HENT:      Head: Normocephalic and atraumatic. Nose: Nose normal.      Mouth/Throat:      Mouth: Mucous membranes are moist.   Eyes:      Extraocular Movements: Extraocular movements intact. Cardiovascular:      Rate and Rhythm: Normal rate and regular rhythm. Pulses: Normal pulses. Heart sounds: Normal heart sounds. Pulmonary:      Effort: Respiratory distress present. Breath sounds: Normal breath sounds. Comments: Increased work of breathing, requiring AirVo  Abdominal:      General: Abdomen is flat. There is no distension. Palpations: Abdomen is soft. Tenderness: There is no abdominal tenderness. Musculoskeletal:         General: Normal range of motion. Cervical back: Normal range of motion. No rigidity. Skin:     General: Skin is warm and dry. Neurological:      General: No focal deficit present. Mental Status: She is alert. Cranial Nerves: No cranial nerve deficit. Psychiatric:         Mood and Affect: Mood normal.          MDM  Number of Diagnoses or Management Options  COVID-19  Hypoxia  Diagnosis management comments: 43-year-old female presents to the emergency department, COVID-19 positive. Patient hypoxic at home into the 60s. In the 80s on 6 L, placed on Air Vo. Patient given 10 mg IV Decadron. Lab work shows normal white count, stable H&H, Chest ray shows bilateral interstitial opacities consistent with patient's diagnosis of COVID-19.   I attempted to call patient's son as she requested unfortunately unable to get a hold of them. BNP elevated at 3400, patient unaware if she has a history of congestive heart failure, lactic acid 3.9, likely secondary to patient's increased work of breathing and respiratory distress. Do not feel this represents sepsis. I will not give copious fluids given patient's elevated BNP, poor appearance of chest x-ray and again low probability of sepsis. CMP pending at the time of shift change. Dr. Lazaro Trujillo will follow these results and will address any emergent findings. I did speak with the hospitalist who agreed admit this patient for continued evaluation and treatment. EKG shows sinus tachycardia, rate 115, , QRS 92, QTc 491, normal axis, no significant ST elevation or depression. Amount and/or Complexity of Data Reviewed  Clinical lab tests: reviewed and ordered  Tests in the radiology section of CPT®: ordered and reviewed  Discussion of test results with the performing providers: yes  Decide to obtain previous medical records or to obtain history from someone other than the patient: yes  Review and summarize past medical records: yes  Discuss the patient with other providers: yes  Independent visualization of images, tracings, or specimens: yes    Risk of Complications, Morbidity, and/or Mortality  Presenting problems: high  Diagnostic procedures: moderate  Management options: high           Critical Care  Performed by: Gina Cerrato DO  Authorized by:  Gina Cerrato DO     Critical care provider statement:     Critical care time (minutes):  36    Critical care time was exclusive of:  Separately billable procedures and treating other patients    Critical care was necessary to treat or prevent imminent or life-threatening deterioration of the following conditions:  Respiratory failure (Hypoxia requiring AirVo (sats in 60s at home))    Critical care was time spent personally by me on the following activities:  Blood draw for specimens, development of treatment plan with patient or surrogate, discussions with consultants, examination of patient, obtaining history from patient or surrogate, ordering and performing treatments and interventions, ordering and review of laboratory studies, ordering and review of radiographic studies, re-evaluation of patient's condition and review of old charts

## 2022-01-26 NOTE — ED TRIAGE NOTES
Patient to ed with [facility] from home. Per ems report patient is covid positive 4 days ago and has been on antivirals for 4 days. Per ems report patient has had diarrhea today, increased confusion today. Room air sat 62% on their arrival. They placed patient on 6l gave terbutaline 600 ml of normal saline and this improved sat to 93% sinus tach on monitor at 108 rr 30 bp 118/61.

## 2022-01-27 ENCOUNTER — HOSPITAL ENCOUNTER (INPATIENT)
Dept: NON INVASIVE DIAGNOSTICS | Age: 87
Discharge: HOME | DRG: 871 | End: 2022-01-27
Attending: PHYSICIAN ASSISTANT
Payer: MEDICARE

## 2022-01-27 LAB
25(OH)D3 SERPL-MCNC: 35.5 NG/ML (ref 30–100)
ALBUMIN SERPL-MCNC: 2 G/DL (ref 3.2–4.6)
ALBUMIN/GLOB SERPL: 0.5 {RATIO} (ref 1.2–3.5)
ALP SERPL-CCNC: 62 U/L (ref 50–136)
ALT SERPL-CCNC: 15 U/L (ref 12–65)
ANION GAP SERPL CALC-SCNC: 4 MMOL/L (ref 7–16)
AST SERPL-CCNC: 52 U/L (ref 15–37)
BASOPHILS # BLD: 0 K/UL (ref 0–0.2)
BASOPHILS NFR BLD: 0 % (ref 0–2)
BILIRUB SERPL-MCNC: 0.3 MG/DL (ref 0.2–1.1)
BUN SERPL-MCNC: 27 MG/DL (ref 8–23)
CALCIUM SERPL-MCNC: 8.4 MG/DL (ref 8.3–10.4)
CHLORIDE SERPL-SCNC: 108 MMOL/L (ref 98–107)
CO2 SERPL-SCNC: 31 MMOL/L (ref 21–32)
CREAT SERPL-MCNC: 0.62 MG/DL (ref 0.6–1)
CRP SERPL-MCNC: 15 MG/DL (ref 0–0.9)
D DIMER PPP FEU-MCNC: 3.94 UG/ML(FEU)
DIFFERENTIAL METHOD BLD: ABNORMAL
ECHO AO ASC DIAM: 3 CM
ECHO AO ASCENDING AORTA INDEX: 1.81 CM/M2
ECHO AO ROOT DIAM: 3.3 CM
ECHO AO ROOT INDEX: 1.99 CM/M2
ECHO AV AREA PEAK VELOCITY: 2 CM2
ECHO AV AREA VTI: 2.2 CM2
ECHO AV AREA/BSA PEAK VELOCITY: 1.2 CM2/M2
ECHO AV AREA/BSA VTI: 1.3 CM2/M2
ECHO AV MEAN GRADIENT: 4 MMHG
ECHO AV MEAN VELOCITY: 0.9 M/S
ECHO AV PEAK GRADIENT: 9 MMHG
ECHO AV PEAK VELOCITY: 1.5 M/S
ECHO AV VELOCITY RATIO: 0.67
ECHO AV VTI: 27.5 CM
ECHO EST RA PRESSURE: 3 MMHG
ECHO IVC PROX: 2 CM
ECHO LA AREA 2C: 13.7 CM2
ECHO LA AREA 4C: 14.2 CM2
ECHO LA DIAMETER INDEX: 1.45 CM/M2
ECHO LA DIAMETER: 2.4 CM
ECHO LA MAJOR AXIS: 5 CM
ECHO LA MINOR AXIS: 4.7 CM
ECHO LA TO AORTIC ROOT RATIO: 0.73
ECHO LA VOL BP: 34 ML (ref 22–52)
ECHO LA VOL/BSA BIPLANE: 20 ML/M2 (ref 16–34)
ECHO LV E' LATERAL VELOCITY: 11 CM/S
ECHO LV E' SEPTAL VELOCITY: 6 CM/S
ECHO LV EDV A2C: 66 ML
ECHO LV EDV A4C: 63 ML
ECHO LV EDV BP: 66 ML (ref 56–104)
ECHO LV EDV INDEX A4C: 38 ML/M2
ECHO LV EDV INDEX BP: 40 ML/M2
ECHO LV EDV NDEX A2C: 40 ML/M2
ECHO LV EJECTION FRACTION A2C: 54 %
ECHO LV EJECTION FRACTION A4C: 60 %
ECHO LV EJECTION FRACTION BIPLANE: 58 % (ref 55–100)
ECHO LV ESV A2C: 31 ML
ECHO LV ESV A4C: 25 ML
ECHO LV ESV INDEX A2C: 19 ML/M2
ECHO LV ESV INDEX A4C: 15 ML/M2
ECHO LV FRACTIONAL SHORTENING: 35 % (ref 28–44)
ECHO LV INTERNAL DIMENSION DIASTOLE INDEX: 2.95 CM/M2
ECHO LV INTERNAL DIMENSION DIASTOLIC: 4.9 CM (ref 3.9–5.3)
ECHO LV INTERNAL DIMENSION SYSTOLIC INDEX: 1.93 CM/M2
ECHO LV INTERNAL DIMENSION SYSTOLIC: 3.2 CM
ECHO LV IVSD: 0.9 CM (ref 0.6–0.9)
ECHO LV MASS 2D: 131.2 G (ref 67–162)
ECHO LV MASS INDEX 2D: 79 G/M2 (ref 43–95)
ECHO LV POSTERIOR WALL DIASTOLIC: 0.7 CM (ref 0.6–0.9)
ECHO LV RELATIVE WALL THICKNESS RATIO: 0.29
ECHO LVOT AREA: 2.8 CM2
ECHO LVOT AV VTI INDEX: 0.77
ECHO LVOT DIAM: 1.9 CM
ECHO LVOT MEAN GRADIENT: 2 MMHG
ECHO LVOT PEAK GRADIENT: 4 MMHG
ECHO LVOT PEAK VELOCITY: 1 M/S
ECHO LVOT STROKE VOLUME INDEX: 36.4 ML/M2
ECHO LVOT SV: 60.4 ML
ECHO LVOT VTI: 21.3 CM
ECHO MV A VELOCITY: 1.25 M/S
ECHO MV E DECELERATION TIME (DT): 219 MS
ECHO MV E VELOCITY: 0.85 M/S
ECHO MV E/A RATIO: 0.68
ECHO MV E/E' LATERAL: 7.73
ECHO MV E/E' RATIO (AVERAGED): 10.95
ECHO MV E/E' SEPTAL: 14.17
ECHO PV ACCELERATION TIME (AT): 132 MS
ECHO PV MAX VELOCITY: 0.9 M/S
ECHO PV PEAK GRADIENT: 3 MMHG
ECHO RIGHT VENTRICULAR SYSTOLIC PRESSURE (RVSP): 27 MMHG
ECHO RV BASAL DIMENSION: 3.3 CM
ECHO RV INTERNAL DIMENSION: 2.7 CM
ECHO RV TAPSE: 2.2 CM (ref 1.5–2)
ECHO TV REGURGITANT MAX VELOCITY: 2.43 M/S
ECHO TV REGURGITANT PEAK GRADIENT: 24 MMHG
EOSINOPHIL # BLD: 0 K/UL (ref 0–0.8)
EOSINOPHIL NFR BLD: 0 % (ref 0.5–7.8)
ERYTHROCYTE [DISTWIDTH] IN BLOOD BY AUTOMATED COUNT: 14.3 % (ref 11.9–14.6)
GLOBULIN SER CALC-MCNC: 3.9 G/DL (ref 2.3–3.5)
GLUCOSE SERPL-MCNC: 154 MG/DL (ref 65–100)
HCT VFR BLD AUTO: 33.6 % (ref 35.8–46.3)
HGB BLD-MCNC: 10.8 G/DL (ref 11.7–15.4)
IMM GRANULOCYTES # BLD AUTO: 0.1 K/UL (ref 0–0.5)
IMM GRANULOCYTES NFR BLD AUTO: 1 % (ref 0–5)
LACTATE SERPL-SCNC: 1.2 MMOL/L (ref 0.4–2)
LYMPHOCYTES # BLD: 0.9 K/UL (ref 0.5–4.6)
LYMPHOCYTES NFR BLD: 16 % (ref 13–44)
MAGNESIUM SERPL-MCNC: 2.5 MG/DL (ref 1.8–2.4)
MCH RBC QN AUTO: 28.9 PG (ref 26.1–32.9)
MCHC RBC AUTO-ENTMCNC: 32.1 G/DL (ref 31.4–35)
MCV RBC AUTO: 89.8 FL (ref 79.6–97.8)
MM INDURATION POC: 0 MM (ref 0–5)
MONOCYTES # BLD: 0.4 K/UL (ref 0.1–1.3)
MONOCYTES NFR BLD: 8 % (ref 4–12)
NEUTS SEG # BLD: 4.2 K/UL (ref 1.7–8.2)
NEUTS SEG NFR BLD: 76 % (ref 43–78)
NRBC # BLD: 0 K/UL (ref 0–0.2)
PLATELET # BLD AUTO: 295 K/UL (ref 150–450)
PMV BLD AUTO: 9.4 FL (ref 9.4–12.3)
POTASSIUM SERPL-SCNC: 3.2 MMOL/L (ref 3.5–5.1)
PPD POC: NORMAL
PROCALCITONIN SERPL-MCNC: 4.84 NG/ML (ref 0–0.49)
PROT SERPL-MCNC: 5.9 G/DL (ref 6.3–8.2)
RBC # BLD AUTO: 3.74 M/UL (ref 4.05–5.2)
SODIUM SERPL-SCNC: 143 MMOL/L (ref 136–145)
TROPONIN-HIGH SENSITIVITY: 663 PG/ML (ref 0–14)
WBC # BLD AUTO: 5.6 K/UL (ref 4.3–11.1)

## 2022-01-27 PROCEDURE — 74011250636 HC RX REV CODE- 250/636: Performed by: FAMILY MEDICINE

## 2022-01-27 PROCEDURE — 74011000250 HC RX REV CODE- 250: Performed by: FAMILY MEDICINE

## 2022-01-27 PROCEDURE — 85025 COMPLETE CBC W/AUTO DIFF WBC: CPT

## 2022-01-27 PROCEDURE — 99223 1ST HOSP IP/OBS HIGH 75: CPT | Performed by: INTERNAL MEDICINE

## 2022-01-27 PROCEDURE — 86140 C-REACTIVE PROTEIN: CPT

## 2022-01-27 PROCEDURE — 74011250637 HC RX REV CODE- 250/637: Performed by: FAMILY MEDICINE

## 2022-01-27 PROCEDURE — 85379 FIBRIN DEGRADATION QUANT: CPT

## 2022-01-27 PROCEDURE — 83735 ASSAY OF MAGNESIUM: CPT

## 2022-01-27 PROCEDURE — 97165 OT EVAL LOW COMPLEX 30 MIN: CPT

## 2022-01-27 PROCEDURE — 74011000250 HC RX REV CODE- 250: Performed by: STUDENT IN AN ORGANIZED HEALTH CARE EDUCATION/TRAINING PROGRAM

## 2022-01-27 PROCEDURE — 82306 VITAMIN D 25 HYDROXY: CPT

## 2022-01-27 PROCEDURE — 93306 TTE W/DOPPLER COMPLETE: CPT

## 2022-01-27 PROCEDURE — 97530 THERAPEUTIC ACTIVITIES: CPT

## 2022-01-27 PROCEDURE — 97162 PT EVAL MOD COMPLEX 30 MIN: CPT

## 2022-01-27 PROCEDURE — 83605 ASSAY OF LACTIC ACID: CPT

## 2022-01-27 PROCEDURE — 84484 ASSAY OF TROPONIN QUANT: CPT

## 2022-01-27 PROCEDURE — 84145 PROCALCITONIN (PCT): CPT

## 2022-01-27 PROCEDURE — 80053 COMPREHEN METABOLIC PANEL: CPT

## 2022-01-27 PROCEDURE — 65660000000 HC RM CCU STEPDOWN

## 2022-01-27 PROCEDURE — 97535 SELF CARE MNGMENT TRAINING: CPT

## 2022-01-27 PROCEDURE — 97112 NEUROMUSCULAR REEDUCATION: CPT

## 2022-01-27 RX ORDER — DEXLANSOPRAZOLE 60 MG/1
60 CAPSULE, DELAYED RELEASE ORAL
COMMUNITY

## 2022-01-27 RX ORDER — HYDROCHLOROTHIAZIDE 25 MG/1
25 TABLET ORAL DAILY
COMMUNITY
End: 2022-02-16

## 2022-01-27 RX ORDER — DEXLANSOPRAZOLE 60 MG/1
60 CAPSULE, DELAYED RELEASE ORAL DAILY
Status: DISCONTINUED | OUTPATIENT
Start: 2022-01-28 | End: 2022-02-16 | Stop reason: HOSPADM

## 2022-01-27 RX ADMIN — HEPARIN SODIUM 5000 UNITS: 5000 INJECTION INTRAVENOUS; SUBCUTANEOUS at 06:11

## 2022-01-27 RX ADMIN — POTASSIUM CHLORIDE 40 MEQ: 20 TABLET, EXTENDED RELEASE ORAL at 06:10

## 2022-01-27 RX ADMIN — SODIUM CHLORIDE, PRESERVATIVE FREE 10 ML: 5 INJECTION INTRAVENOUS at 06:27

## 2022-01-27 RX ADMIN — HEPARIN SODIUM 5000 UNITS: 5000 INJECTION INTRAVENOUS; SUBCUTANEOUS at 14:25

## 2022-01-27 RX ADMIN — PANTOPRAZOLE SODIUM 40 MG: 40 TABLET, DELAYED RELEASE ORAL at 06:11

## 2022-01-27 RX ADMIN — DEXAMETHASONE SODIUM PHOSPHATE 6 MG: 10 INJECTION, SOLUTION INTRAMUSCULAR; INTRAVENOUS at 09:17

## 2022-01-27 RX ADMIN — VITAMIN D, TAB 1000IU (100/BT) 2000 UNITS: 25 TAB at 09:21

## 2022-01-27 RX ADMIN — SODIUM CHLORIDE, PRESERVATIVE FREE 10 ML: 5 INJECTION INTRAVENOUS at 21:09

## 2022-01-27 RX ADMIN — POTASSIUM CHLORIDE 40 MEQ: 20 TABLET, EXTENDED RELEASE ORAL at 12:11

## 2022-01-27 RX ADMIN — ATORVASTATIN CALCIUM 40 MG: 40 TABLET, FILM COATED ORAL at 21:08

## 2022-01-27 RX ADMIN — HEPARIN SODIUM 5000 UNITS: 5000 INJECTION INTRAVENOUS; SUBCUTANEOUS at 21:08

## 2022-01-27 NOTE — PROGRESS NOTES
Pt arrived to room 835  Via stretcher from the ER. Pt alert oriented time 3 at this time. Pt on airvo at 55L 85% at this time with sat 96%. Continuous sat monitor in place. Pt denies pain or distress at this time. Pt placed on remote telemetry. Pt has no skin breakdown noted at this time. Pt skin assessed with [facility] JOSE LUIS MARSHALL RN. Pt oriented to room and surroundings. Pt encouraged to call for assistance if needed call light in reach, will monitor.

## 2022-01-27 NOTE — H&P
[facility] Initial Cardiac Evaluation      Date of  Admission: 1/26/2022 12:32 PM     Primary Care Physician: Marcel Manuel MD  Primary Cardiologist: None  Referring Physician: Dr Bette Levin   Supervising Physician: Dr Valerie Bose    CC/Reason for evaluation: elevated troponin with abnormal EKG     HPI:  Suleiman Kelsey is a 80 y.o. female with PMH of HTN, HLD, GERD/PUD, Merchant esophagus and recent diagnosis of COVID 19 infection who presented to [facility] ED via EMS on 1/26 with complaint of increased confusion and SOB. Upon arrival of EMS, patient was hypoxic with O2 saturation of  62%. Pt was placed on 6L and give terbutaline with improved O2 saturation. Upon arrival to ED, patient was febrile with temp 102 and hypoxic with O2 sat of 74%. Labs showed WBC 7.0, H/H 11.6/36, Ptl 313, Na 143, K 2.5, BUN/Cr 24/0.88, lactic acid 3.9, procalcitonin 6.15, hs trop [id number] 1334, pBNP 3481, CRP 17.3. CXR showed bilateral interstitial and alveolar pulmonary opacities. CT chest showed extensive bilateral infiltrates typical of COVID pneumonia and was negative for pulmonary embolism. Patient was admitted to IM service for acute hypoxemic respiratory failure secondary to COVID 19 pneumonia and sepsis. Cardiology consulted for elevated troponin with abnormal EKG. Patient denies cardiac history. Denies chest pain, pressure, or tightness. Denies FH CAD. Denies smoking history.       Past Medical History:   Diagnosis Date    Arthritis     osteo    Merchant esophagus     Chronic pain     left knee, back and neck    GERD (gastroesophageal reflux disease)     Glaucoma     Hyperlipidemia     Hypertension     Macular degeneration       Past Surgical History:   Procedure Laterality Date    HX CATARACT REMOVAL      bilateral   with lens implants    HX CERVICAL FUSION  2002    HX ENDOSCOPY  02/13/2018    EGD-ulcer, gastritis    HX HEENT  1958    nasal fx from [address]    septoplasy    HX HYSTERECTOMY  1961    HX LUMBAR FUSION  2009    HX ORTHOPAEDIC  2013    lt knee replacement    HX TONSILLECTOMY         Allergies   Allergen Reactions    Sulfa (Sulfonamide Antibiotics) Rash      Social History     Socioeconomic History    Marital status:      Spouse name: Not on file    Number of children: Not on file    Years of education: Not on file    Highest education level: Not on file   Occupational History    Not on file   Tobacco Use    Smoking status: Never Smoker    Smokeless tobacco: Never Used   Substance and Sexual Activity    Alcohol use: No    Drug use: No    Sexual activity: Not on file   Other Topics Concern    Not on file   Social History Narrative    Not on file     Social Determinants of Health     Financial Resource Strain:     Difficulty of Paying Living Expenses: Not on file   Food Insecurity:     Worried About Running Out of Food in the Last Year: Not on file    Larisa of Food in the Last Year: Not on file   Transportation Needs:     Lack of Transportation (Medical): Not on file    Lack of Transportation (Non-Medical):  Not on file   Physical Activity:     Days of Exercise per Week: Not on file    Minutes of Exercise per Session: Not on file   Stress:     Feeling of Stress : Not on file   Social Connections:     Frequency of Communication with Friends and Family: Not on file    Frequency of Social Gatherings with Friends and Family: Not on file    Attends Tenriism Services: Not on file    Active Member of Clubs or Organizations: Not on file    Attends Club or Organization Meetings: Not on file    Marital Status: Not on file   Intimate Partner Violence:     Fear of Current or Ex-Partner: Not on file    Emotionally Abused: Not on file    Physically Abused: Not on file    Sexually Abused: Not on file   Housing Stability:     Unable to Pay for Housing in the Last Year: Not on file    Number of [place] in the Last Year: Not on file    Unstable Housing in the Last Year: Not on file     Family History   Problem Relation Age of Onset    Stroke Father     Lung Disease Sister     Stroke Brother     Breast Cancer Neg Hx         Current Facility-Administered Medications   Medication Dose Route Frequency    sodium chloride (NS) flush 5-10 mL  5-10 mL IntraVENous Q8H    sodium chloride (NS) flush 5-10 mL  5-10 mL IntraVENous PRN    sodium chloride (NS) flush 5-40 mL  5-40 mL IntraVENous Q8H    sodium chloride (NS) flush 5-40 mL  5-40 mL IntraVENous PRN    acetaminophen (TYLENOL) tablet 650 mg  650 mg Oral Q6H PRN    Or    acetaminophen (TYLENOL) suppository 650 mg  650 mg Rectal Q6H PRN    polyethylene glycol (MIRALAX) packet 17 g  17 g Oral DAILY PRN    ondansetron (ZOFRAN ODT) tablet 4 mg  4 mg Oral Q8H PRN    Or    ondansetron (ZOFRAN) injection 4 mg  4 mg IntraVENous Q6H PRN    tuberculin injection 5 Units  5 Units IntraDERMal ONCE    heparin (porcine) injection 5,000 Units  5,000 Units SubCUTAneous Q8H    guaiFENesin-dextromethorphan (ROBITUSSIN DM) 100-10 mg/5 mL syrup 5 mL  5 mL Oral Q4H PRN    dexamethasone (DECADRON) 10 mg/mL injection 6 mg  6 mg IntraVENous Q24H    cholecalciferol (VITAMIN D3) (1000 Units /25 mcg) tablet 2,000 Units  2,000 Units Oral DAILY    alum-mag hydroxide-simeth (MYLANTA) oral suspension 15 mL  15 mL Oral Q6H PRN    pantoprazole (PROTONIX) tablet 40 mg  40 mg Oral ACB    albuterol (PROVENTIL HFA, VENTOLIN HFA, PROAIR HFA) inhaler 2 Puff  2 Puff Inhalation Q4H PRN    potassium chloride (K-DUR, KLOR-CON M20) SR tablet 40 mEq  40 mEq Oral Q6H    atorvastatin (LIPITOR) tablet 40 mg  40 mg Oral QHS    gabapentin (NEURONTIN) capsule 300 mg  300 mg Oral TID PRN    temazepam (RESTORIL) capsule 15 mg  15 mg Oral QHS PRN     Current Outpatient Medications   Medication Sig    dexlansoprazole (Dexilant) 60 mg CpDB capsule (delayed release) Take 60 mg by mouth Daily (before breakfast).     suvorexant (BELSOMRA) 20 mg tablet Take 1 Tab by mouth nightly as needed for Insomnia. Max Daily Amount: 20 mg.    doxepin (SINEQUAN) 10 mg capsule Take 1 Cap by mouth nightly.  simvastatin (ZOCOR) 20 mg tablet Take 1 Tab by mouth nightly.  NIFEdipine ER (PROCARDIA XL) 30 mg ER tablet Take 1 Tab by mouth daily.  garlic 7,462 mg cap Take  by mouth.  b complex vitamins tablet Take 1 Tab by mouth daily.  traMADol (ULTRAM) 50 mg tablet Take 2 Tabs by mouth every eight (8) hours as needed. Max Daily Amount: 300 mg.    baclofen (LIORESAL) 10 mg tablet Take 1 Tab by mouth nightly as needed. Indications: Muscle Spasticity of Spinal Origin    pantoprazole (PROTONIX) 40 mg tablet Take 1 Tab by mouth Daily (before breakfast).  gabapentin (NEURONTIN) 800 mg tablet Take 1 Tab by mouth two (2) times a day.  triamterene-hydroCHLOROthiazide (MAXZIDE-25MG) 37.5-25 mg per tablet Take 1 Tab by mouth daily.  cinnamon bark (CINNAMON) 500 mg cap Take 500 mg by mouth two (2) times a day.  OTHER,NON-FORMULARY, Indications: hair/nail supplement    OTHER,NON-FORMULARY, Indications: Brain health    fish oil-omega-3 fatty acids (FISH OIL) 340-1,000 mg capsule Take 1 Cap by mouth daily. Indications: 1200 mg    calcium-cholecalciferol, d3, (CALCIUM 600 + D) 600-125 mg-unit tab Take  by mouth.  aspirin delayed-release 81 mg tablet Take  by mouth daily.  Alpha Lipoic Acid 600 mg cap Take  by mouth daily.  potassium 99 mg tablet Take 99 mg by mouth daily.  coenzyme q10-vitamin e (COQ10 ) 100-100 mg-unit cap Take  by mouth daily.  ginkgo biloba (GINKGO) 60 mg Tab Take 120 mg by mouth daily.  timolol (TIMOPTIC) 0.5 % ophthalmic solution Administer 1 Drop to left eye daily. Take DOS per anesthesia protocol.  multivitamin (ONE A DAY) tablet Take 1 Tab by mouth daily.  pyridoxine (VITAMIN B-6) 200 mg tablet Take 500 mg by mouth daily.  sodium chloride (SALINEX) 0.4 % nasal spray 1 Spray as needed.        Review of Symptoms:    General: Positive for generalized weakness, fever, chills. No weight change  Skin: no rashes, lumps, or other skin changes  HEENT: no headache, dizziness, lightheadedness, vision changes, hearing changes, tinnitus, vertigo, sinus pressure/pain, bleeding gums, sore throat, or hoarseness  Neck: no swollen glands, goiter, pain or stiffness  Respiratory: Positive for cough, dyspnea. No sputum, hemoptysis, wheezing  Cardiovascular: + as per HPI  Gastrointestinal: Positive for GERD/PUD, jimenez esophagus. No constipation, diarrhea, liver problems, or h/o GI bleed  Urinary: no frequency, urgency , hematuria, burning/pain with urination, recent flank pain, polyuria, nocturia, or difficulty urinating  Peripheral Vascular: no claudication, leg cramps, prior DVTs, swelling of calves, legs, or feet, color change, or swelling with redness or tenderness  Musculoskeletal: no muscle or joint pain/stiffness, joint swelling, erythema of joints, or back pain  Psychiatric: no depression or excessive stress  Neurological: no sensory or motor loss, seizures, syncope, tremors, numbness, no dementia  Hematologic: no anemia, easy bruising or bleeding  Endocrine: No thyroid problems, heat or cold intolerance, excessive sweating, polyuria, polydipsia,  diabetes.        Subjective:     Physical Exam:    Vitals:    01/27/22 0742 01/27/22 0800 01/27/22 0804 01/27/22 0920   BP: 132/75 108/64     Pulse: 71 65     Resp:       Temp:       SpO2: 93% (!) 89% 93% 95%   Weight:       Height:         General: Well Developed, Well Nourished, No Acute Distress  HEENT: pupils equal and round, no abnormalities noted  Neck: supple, no JVD, no carotid bruits  Heart: S1S2 with RRR without murmurs or gallops  Lungs: Clear throughout auscultation bilaterally without adventitious sounds  Abd: soft, nontender, nondistended, with good bowel sounds  Ext: warm, no edema, calves supple/nontender, pulses 2+ bilaterally  Skin: warm and dry  Psychiatric: Normal mood and affect  Neurologic: Alert and oriented X 3    Cardiographics    Telemetry: normal sinus rhythm  ECG: sinus tachycardia  Echocardiogram: ordered     Labs:   Recent Results (from the past 24 hour(s))   EKG, 12 LEAD, INITIAL    Collection Time: 01/26/22 12:51 PM   Result Value Ref Range    Ventricular Rate 115 BPM    Atrial Rate 116 BPM    P-R Interval 143 ms    QRS Duration 92 ms    Q-T Interval 355 ms    QTC Calculation (Bezet) 491 ms    Calculated P Axis 49 degrees    Calculated R Axis -23 degrees    Calculated T Axis 46 degrees    Diagnosis       Sinus tachycardia  Borderline left axis deviation  Low voltage, precordial leads  Abnormal R-wave progression, late transition  Borderline repol abnrm, anterolateral leads  Borderline prolonged QT interval    Confirmed by Richard Carlos MD (), JACKELINE LOVE ([id number]) on 1/26/2022 3:24:35 PM     CBC WITH AUTOMATED DIFF    Collection Time: 01/26/22 12:56 PM   Result Value Ref Range    WBC 7.0 4.3 - 11.1 K/uL    RBC 4.03 (L) 4.05 - 5.2 M/uL    HGB 11.6 (L) 11.7 - 15.4 g/dL    HCT 36.0 35.8 - 46.3 %    MCV 89.3 79.6 - 97.8 FL    MCH 28.8 26.1 - 32.9 PG    MCHC 32.2 31.4 - 35.0 g/dL    RDW 14.0 11.9 - 14.6 %    PLATELET 883 334 - 196 K/uL    MPV 9.4 9.4 - 12.3 FL    ABSOLUTE NRBC 0.00 0.0 - 0.2 K/uL    DF AUTOMATED      NEUTROPHILS 66 43 - 78 %    LYMPHOCYTES 21 13 - 44 %    MONOCYTES 12 4.0 - 12.0 %    EOSINOPHILS 0 (L) 0.5 - 7.8 %    BASOPHILS 0 0.0 - 2.0 %    IMMATURE GRANULOCYTES 1 0.0 - 5.0 %    ABS. NEUTROPHILS 4.6 1.7 - 8.2 K/UL    ABS. LYMPHOCYTES 1.5 0.5 - 4.6 K/UL    ABS. MONOCYTES 0.8 0.1 - 1.3 K/UL    ABS. EOSINOPHILS 0.0 0.0 - 0.8 K/UL    ABS. BASOPHILS 0.0 0.0 - 0.2 K/UL    ABS. IMM.  GRANS. 0.1 0.0 - 0.5 K/UL   METABOLIC PANEL, COMPREHENSIVE    Collection Time: 01/26/22 12:56 PM   Result Value Ref Range    Sodium 143 136 - 145 mmol/L    Potassium 2.5 (L) 3.5 - 5.1 mmol/L    Chloride 106 98 - 107 mmol/L    CO2 25 21 - 32 mmol/L    Anion gap 12 7 - 16 mmol/L    Glucose 128 (H) 65 - 100 mg/dL    BUN 24 (H) 8 - 23 MG/DL    Creatinine 0.88 0.6 - 1.0 MG/DL    GFR est AA >60 >60 ml/min/1.73m2    GFR est non-AA >60 >60 ml/min/1.73m2    Calcium 8.3 8.3 - 10.4 MG/DL    Bilirubin, total 0.4 0.2 - 1.1 MG/DL    ALT (SGPT) 19 12 - 65 U/L    AST (SGOT) 59 (H) 15 - 37 U/L    Alk.  phosphatase 67 50 - 136 U/L    Protein, total 6.8 6.3 - 8.2 g/dL    Albumin 2.3 (L) 3.2 - 4.6 g/dL    Globulin 4.5 (H) 2.3 - 3.5 g/dL    A-G Ratio 0.5 (L) 1.2 - 3.5     MAGNESIUM    Collection Time: 01/26/22 12:56 PM   Result Value Ref Range    Magnesium 1.7 (L) 1.8 - 2.4 mg/dL   TROPONIN-HIGH SENSITIVITY    Collection Time: 01/26/22 12:56 PM   Result Value Ref Range    Troponin-High Sensitivity 1,544.2 (HH) 0 - 14 pg/mL   FERRITIN    Collection Time: 01/26/22 12:56 PM   Result Value Ref Range    Ferritin 273 8 - 388 NG/ML   LD    Collection Time: 01/26/22 12:56 PM   Result Value Ref Range     (H) 110 - 210 U/L   NT-PRO BNP    Collection Time: 01/26/22 12:58 PM   Result Value Ref Range    NT pro-BNP 3,481 (H) <450 PG/ML   COVID-19 RAPID TEST    Collection Time: 01/26/22 12:58 PM   Result Value Ref Range    Specimen source NASAL      COVID-19 rapid test Detected (AA) NOTD     LACTIC ACID    Collection Time: 01/26/22 12:58 PM   Result Value Ref Range    Lactic acid 3.9 (HH) 0.4 - 2.0 MMOL/L   C REACTIVE PROTEIN, QT    Collection Time: 01/26/22 12:58 PM   Result Value Ref Range    C-Reactive protein 17.3 (H) 0.0 - 0.9 mg/dL   C REACTIVE PROTEIN, QT    Collection Time: 01/26/22  1:00 PM   Result Value Ref Range    C-Reactive protein 17.3 (H) 0.0 - 0.9 mg/dL   D DIMER    Collection Time: 01/26/22  1:00 PM   Result Value Ref Range    D DIMER 3.85 (H) <0.56 ug/ml(FEU)   TROPONIN-HIGH SENSITIVITY    Collection Time: 01/26/22  3:04 PM   Result Value Ref Range    Troponin-High Sensitivity 1,609.8 (HH) 0 - 14 pg/mL   LACTIC ACID    Collection Time: 01/26/22  3:04 PM   Result Value Ref Range    Lactic acid 3.5 (H) 0.4 - 2.0 MMOL/L PROCALCITONIN    Collection Time: 01/26/22  3:04 PM   Result Value Ref Range    Procalcitonin 6.15 (H) 0.00 - 0.49 ng/mL   TROPONIN-HIGH SENSITIVITY    Collection Time: 01/26/22  6:14 PM   Result Value Ref Range    Troponin-High Sensitivity 1,334.5 (HH) 0 - 14 pg/mL   TROPONIN-HIGH SENSITIVITY    Collection Time: 01/26/22 10:00 PM   Result Value Ref Range    Troponin-High Sensitivity 1,004.9 (HH) 0 - 14 pg/mL   LACTIC ACID    Collection Time: 01/26/22 10:00 PM   Result Value Ref Range    Lactic acid 2.1 (H) 0.4 - 2.0 MMOL/L   VITAMIN D, 25 HYDROXY    Collection Time: 01/27/22  3:15 AM   Result Value Ref Range    Vitamin D 25-Hydroxy 35.5 30.0 - 100.0 ng/mL   PROCALCITONIN    Collection Time: 01/27/22  3:15 AM   Result Value Ref Range    Procalcitonin 4.84 (H) 0.00 - 6.00 ng/mL   METABOLIC PANEL, COMPREHENSIVE    Collection Time: 01/27/22  3:15 AM   Result Value Ref Range    Sodium 143 136 - 145 mmol/L    Potassium 3.2 (L) 3.5 - 5.1 mmol/L    Chloride 108 (H) 98 - 107 mmol/L    CO2 31 21 - 32 mmol/L    Anion gap 4 (L) 7 - 16 mmol/L    Glucose 154 (H) 65 - 100 mg/dL    BUN 27 (H) 8 - 23 MG/DL    Creatinine 0.62 0.6 - 1.0 MG/DL    GFR est AA >60 >60 ml/min/1.73m2    GFR est non-AA >60 >60 ml/min/1.73m2    Calcium 8.4 8.3 - 10.4 MG/DL    Bilirubin, total 0.3 0.2 - 1.1 MG/DL    ALT (SGPT) 15 12 - 65 U/L    AST (SGOT) 52 (H) 15 - 37 U/L    Alk.  phosphatase 62 50 - 136 U/L    Protein, total 5.9 (L) 6.3 - 8.2 g/dL    Albumin 2.0 (L) 3.2 - 4.6 g/dL    Globulin 3.9 (H) 2.3 - 3.5 g/dL    A-G Ratio 0.5 (L) 1.2 - 3.5     CBC WITH AUTOMATED DIFF    Collection Time: 01/27/22  3:15 AM   Result Value Ref Range    WBC 5.6 4.3 - 11.1 K/uL    RBC 3.74 (L) 4.05 - 5.2 M/uL    HGB 10.8 (L) 11.7 - 15.4 g/dL    HCT 33.6 (L) 35.8 - 46.3 %    MCV 89.8 79.6 - 97.8 FL    MCH 28.9 26.1 - 32.9 PG    MCHC 32.1 31.4 - 35.0 g/dL    RDW 14.3 11.9 - 14.6 %    PLATELET 426 760 - 564 K/uL    MPV 9.4 9.4 - 12.3 FL    ABSOLUTE NRBC 0.00 0.0 - 0.2 K/uL DF AUTOMATED      NEUTROPHILS 76 43 - 78 %    LYMPHOCYTES 16 13 - 44 %    MONOCYTES 8 4.0 - 12.0 %    EOSINOPHILS 0 (L) 0.5 - 7.8 %    BASOPHILS 0 0.0 - 2.0 %    IMMATURE GRANULOCYTES 1 0.0 - 5.0 %    ABS. NEUTROPHILS 4.2 1.7 - 8.2 K/UL    ABS. LYMPHOCYTES 0.9 0.5 - 4.6 K/UL    ABS. MONOCYTES 0.4 0.1 - 1.3 K/UL    ABS. EOSINOPHILS 0.0 0.0 - 0.8 K/UL    ABS. BASOPHILS 0.0 0.0 - 0.2 K/UL    ABS. IMM. GRANS. 0.1 0.0 - 0.5 K/UL   D DIMER    Collection Time: 01/27/22  3:15 AM   Result Value Ref Range    D DIMER 3.94 (H) <0.56 ug/ml(FEU)   C REACTIVE PROTEIN, QT    Collection Time: 01/27/22  3:15 AM   Result Value Ref Range    C-Reactive protein 15.0 (H) 0.0 - 0.9 mg/dL   MAGNESIUM    Collection Time: 01/27/22  3:15 AM   Result Value Ref Range    Magnesium 2.5 (H) 1.8 - 2.4 mg/dL   TROPONIN-HIGH SENSITIVITY    Collection Time: 01/27/22  3:15 AM   Result Value Ref Range    Troponin-High Sensitivity 663.0 (HH) 0 - 14 pg/mL       Pt has been seen and examined by Dr. Kulwant Lopez. He agrees with the following assessment and plan.      Assessment/Plan:      Principal Problem:    Acute hypoxemic respiratory failure due to COVID-19 ([facility] [address] (1/26/2022)  - on Hi-Flow O2  - Rapid COVID- positive   - per primary      Active Problems:    Elevated troponin   - likely demand ischemia in setting of acute respiratory failure/COVID 19 infection/pneumonia  - troponin trending down (0594 - 0029- 8134 -1004)  - patient completely chest pain free   - when able recommend starting BB instead of resuming home procardia  - ECHO       Essential (primary) hypertension (10/28/2016)  - stable, on low side   - PTA medications held on admission  - monitor and resume as needed       Gastroesophageal reflux disease without esophagitis (9/28/2017)  - on PPI  - per primary       Hyperlipidemia (10/28/2016)   - continue atorvastatin       Insomnia, persistent (4/27/2016)  - per primary       Primary osteoarthritis involving multiple joints (7/31/2015)  - per primary       Sepsis due to COVID-19 [facility]) (1/26/2022)  - per primary        Hypomagnesemia (1/26/2022)  - replace and monitor with labs       Hypokalemia (1/26/2022)  - replace and monitor with labs       Toxic metabolic encephalopathy (6/16/8777)   - per primary       Chronic prescription benzodiazepine use (1/26/2022)  - per primary       Hypercoagulable state associated with COVID-19 ([address] (1/26/2022)  - per primary       Transaminitis (1/26/2022)   - per primary       Thank you for requesting cardiac evaluation and allowing us to participate in the care of this patient. We will continue to follow along with you. Clifton Hill PA-C  Supervising Physician: Dr Tavo Damon:    Patient seen and examined by me. Agree with above note by physician extender. Key findings are:  No CP or arrhythmia symptoms but worsening shortness of breath, cough, and hypoxia consistent with Covid pneumonia, recently diagnosed few days ago. Saturations severely decreased on admission in the 62s. No prior cardiac history but troponins elevated, almost certainly demand ischemia in the setting. ECG without ST or T wave changes to suggest acute ischemia and no angina whatsoever either today or in the recent weeks, prior to admission. CV- RRR without murmur, cannot assess JVD with work of breathing, cannot hear carotids with optiflow in place  Lungs- coarse rhonchi diffusely anterior and posterior  Abd- soft, nontender, nondistended  Ext- no edema    Plan: As above. Check echo for baseline EF and wall motion. Troponin already trending down the most likely demand ischemia. I do not think she needs heparin but if she has any new onset chest discomfort we will initiate heparin and treat accordingly. I will try to avoid any invasive cardiac work-up but she can always have a noninvasive work-up in the office in several weeks after she convalesces from her acute Covid pneumonia.   We will continue to follow and recheck labs in the morning, repleting electrolytes and following telemetry. Will maximize cardiac meds as tolerated overnight. We will continue to follow with you. I performed an entire repeat history and physical on my own and formed the assessment and plan in conjunction with [employer] NEL Hill MD  [facility] Cardiology  Pager [phone]

## 2022-01-27 NOTE — ED NOTES
TRANSFER - OUT REPORT:    Verbal report given to [address], RN on Luli Kennedy  being transferred to [facility]  for routine progression of care       Report consisted of patients Situation, Background, Assessment and   Recommendations(SBAR). Information from the following report(s) SBAR, Kardex, ED Summary, [facility] and Recent Results was reviewed with the receiving nurse. Lines:   Peripheral IV 01/26/22 Right Antecubital (Active)   Site Assessment Clean, dry, & intact 01/27/22 0743   Phlebitis Assessment 0 01/27/22 0743   Infiltration Assessment 0 01/27/22 0743   Dressing Status Clean, dry, & intact 01/27/22 0743   Dressing Type Tape;Transparent 01/27/22 0743   Hub Color/Line Status Flushed;Patent 01/27/22 8269        Opportunity for questions and clarification was provided.       Patient transported with:   O2 55L, 94%

## 2022-01-27 NOTE — PROGRESS NOTES
END OF SHIFT NOTE:    INTAKE/OUTPUT  01/26 0701 - 01/27 0700  In: 240 [P.O.:240]  Out: 500 [Urine:500]  Voiding: YES  Catheter: external catheter  Drain:              Flatus: Patient does have flatus present. Stool:  0 occurrences. Characteristics:       Emesis: 0 occurrences. Characteristics:        VITAL SIGNS  Patient Vitals for the past 12 hrs:   Temp Pulse BP SpO2   01/27/22 1200 97.7 °F (36.5 °C) 89 112/78 96 %   01/27/22 0920 -- -- -- 95 %   01/27/22 0804 -- -- -- 93 %   01/27/22 0800 -- 65 108/64 (!) 89 %   01/27/22 0742 -- 71 132/75 93 %   01/27/22 0737 -- 72 -- 90 %   01/27/22 0549 -- -- -- 91 %       Pain Assessment  Pain Intensity 1: 0 (01/27/22 1128)             Ambulating  Yes    Shift report given to oncoming nurse at the bedside.     Eloisa Beckwith RN [facility]  Same-Day Surgery   Adult Discharge Orders & Instructions   For 24 hours after surgery  1. Get plenty of rest.  A responsible adult must stay with you for at least 24 hours after you leave the hospital.   2. Do not drive or use heavy equipment.  If you have weakness or tingling, don't drive or use heavy equipment until this feeling goes away.  3. Do not drink alcohol.  4. Avoid strenuous or risky activities.  Ask for help when climbing stairs.   5. You may feel lightheaded.  IF so, sit for a few minutes before standing.  Have someone help you get up.   6. If you have nausea (feel sick to your stomach): Drink only clear liquids such as apple juice, ginger ale, broth or 7-Up.  Rest may also help.  Be sure to drink enough fluids.  Move to a regular diet as you feel able.  7. You may have a slight fever. Call the doctor if your fever is over 100 F (37.7 C) (taken under the tongue) or lasts longer than 24 hours.  8. You may have a dry mouth, a sore throat, muscle aches or trouble sleeping.  These should go away after 24 hours.  9. Do not make important or legal decisions.   Call your doctor for any of the followin.  Signs of infection (fever, growing tenderness at the surgery site, a large amount of drainage or bleeding, severe pain, foul-smelling drainage, redness, swelling).    2. It has been over 8 to 10 hours since surgery and you are still not able to urinate (pass water).    3.  Headache for over 24 hours.   Tylenol 975 mg given at 10 AM

## 2022-01-27 NOTE — PROGRESS NOTES
Hospitalist Progress Note   Admit Date:  2022 12:32 PM   Name:  Meggan Mackay   Age:  80 y.o. Sex:  female  :  1935   MRN:  [id number]   Room:  ER/    Presenting Complaint: Positive For Covid-19    Reason(s) for Admission: Acute hypoxemic respiratory failure due to COVID-19 [facility]) [U07.1, J96.01]     Hospital Course & Interval History:   Meggan Mackay is a 80 y.o. female with medical history of HTN, mixed HLD, GERD, Glaucoma, Hay fever,  HLD, insomnia who presented from home via EMS with hypoxia and AMS with recent diagnosis of COVID-19. EMS reports patient was hypoxic on arrival, O2 saturation in the 60s.  Placed on 6 L and given terbutaline.  EMS also noted family some concern for patient being more altered than normal.     In ED, T102 BP 91/56    spo2 74% RA, 85% 6L NC, 91% 55L/m  LA 3.9 CRP 17.3 D dimer 3.85 NT pro BNP 3481 HS trop 1902>9809   CXR BL infiltrates   rec'd Tylenol 1000 mg and decadron 10 mg IV      EKG shows sinus tachycardia, rate 115, , QRS 92, QTc 491     She is alert and oriented to person, place  but not month or year. Unable to get in touch with either son or . She notes symptom onset 5 days ago. Patient denies CP or palpitations, nausea, vomiting, diarrhea, fevers. +chills, +cough. Says  is sick. Discussed Actemra with patient including risks and benefits.      satting 94% on 55/90% airvo. Subjective (22): Patient seen and examined and continues to complain of shortness of breath denies fever chills. Complains of body aches and back pain. Assessment & Plan:     Acute Hypoxemic Respiratory Failure 2/2 COVID-19 PNA   Suspected Cytokine Storm with elevated markers   - COVID + 16   - Unvaccinated   - Decadron 6 mg x 10 doses   - Consult pharm for Actemra  (CRP 17.3)  - Charted O2 Flow Rate (L/min): 55 l/min.   Wean as able  - Awake proning as tolerated, anti-tussive PRN, Bronchodilators   -patient continues to require heated high flow at 55 L and is not clinically improving. Continue Actemra, oral steroids and maximizing pulmonary toilet with HHF       Sepsis 2/2 COVID - admin 30 cc/kg bolus. Trend LA. At risk of third spacing with sepsis, hypoalbuminemia. Monitor for worsening respiratory status. No known hx CHF. Clinically dry appearing. 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 Check Procal. Superimposed bacterial component not suspected at this time. 1/27-resolving with normalization of lactic acid and now downtrending cardiac enzymes. Continue to monitor.       Elevated Troponin - repeat HS trop increased 1600s. Repeat EKG. Admin  mg x 1 doses. Repeat Trop in 3 hours. Consult Cardiology. Suspected demand ischemia, sepsis induced cardiomyopathy, VTE, NSTEMI denies typical symptoms, although some confusion. 1/27-patient chest pain-free. Likely represents demand ischemia. Cardiac enzymes downtrending. EKG does not demonstrate ST segment elevation. Cardiology consulted and recommending transitioning to beta-blocker once clinically stabilized    Hypercoagulable state associated with COVID-19 - check CTPE now. 1/27-CTA negative for PE       Hypokalemia // Hypomagnesemia - associated with prolonged QTc   Kcl 40 q6h   Mag 2 g IV   Avoid QT prolonging agents at this time. At risk for torsades   Repeat labs in AM   1/27-replace and monitor    Acute Encephalopathy - multifactorial etiology including Toxic, Metabolic and hypoxia. No focal findings. Supportive care. Delirium precautions. Adjust sedative meds until improved. 1/27-seems to be improving and appears at baseline       Chronic insomnia - takes temazepam 30 mg nightly for this. At risk of withdrawal. Reduce dose to 15 mg as needed.      Lumbar spondylosis - f/b pain management. Takes gabapentin 800 mg BID, tramadol 100 mg BID. Reduce gabapentin 300 mg TID prn, hold tramadol for now.        HLD - start atorvastatin      H/o HTN - BP low. Monitor      GERD - resume protonix.      There is a high probability of acute organ impairment or life-threatening deterioration in the patient's condition from: Sepsis, acute respiratory failure on HFNC, VTE, severe electrolyte abnormalities     Diet:  ADULT DIET Easy to Chew; Low Fat/Low Chol/High Fiber/EVA  DVT PPx:  Code status: Full Code    Hospital Problems as of 1/27/2022 Date Reviewed: 4/12/2018          Codes Class Noted - Resolved POA    * (Principal) Acute hypoxemic respiratory failure due to COVID-19 [facility]) ICD-10-CM: U07.1, J96.01  ICD-9-CM: 518.81, 079.89, 799.02  1/26/2022 - Present Yes        Sepsis due to COVID-19 [facility]) ICD-10-CM: U07.1, A41.89  ICD-9-CM: 079.89, 995.91  1/26/2022 - Present Yes        Hypomagnesemia ICD-10-CM: E83.42  ICD-9-CM: 275.2  1/26/2022 - Present Yes        Hypokalemia ICD-10-CM: E87.6  ICD-9-CM: 276.8  1/26/2022 - Present Yes        Prolonged Q-T interval on ECG ICD-10-CM: R94.31  ICD-9-CM: 794.31  1/26/2022 - Present Yes        Elevated troponin ICD-10-CM: R77.8  ICD-9-CM: 790.6  1/26/2022 - Present Yes        Toxic metabolic encephalopathy QQK-61-SI: G92.8  ICD-9-CM: 349.82  1/26/2022 - Present Yes        Chronic prescription benzodiazepine use ICD-10-CM: Z79.899  ICD-9-CM: V58.69  1/26/2022 - Present Yes        Hypercoagulable state associated with COVID-19 ([facility] [address] ICD-10-CM: U07.1, D68.69  ICD-9-CM: 289.82, 079.89  1/26/2022 - Present Yes        Transaminitis ICD-10-CM: R74.01  ICD-9-CM: 790.4  1/26/2022 - Present Yes        Gastroesophageal reflux disease without esophagitis ICD-10-CM: K21.9  ICD-9-CM: 530.81  9/28/2017 - Present Yes    Overview Signed 1/9/2018 10:12 AM by Taras Munguia DO     Last Assessment & Plan:   Stable. Essential (primary) hypertension ICD-10-CM: I10  ICD-9-CM: 401.9  10/28/2016 - Present Yes    Overview Signed 1/9/2018 10:12 AM by Taras Munguia DO     Last Assessment & Plan:   Stable.              Hyperlipidemia ICD-10-CM: E78.5  ICD-9-CM: 272.4  10/28/2016 - Present Yes    Overview Signed 1/9/2018 10:12 AM by Deshaun Anderson DO     Last Assessment & Plan:   Return for fasting labs. Insomnia, persistent ICD-10-CM: G47.00  ICD-9-CM: 307.42  4/27/2016 - Present Yes    Overview Signed 1/9/2018 10:12 AM by Deshaun Anderson DO     Last Assessment & Plan:   Restoril prescription printed & given. Primary osteoarthritis involving multiple joints ICD-10-CM: M89.49  ICD-9-CM: 715.98  7/31/2015 - Present Yes    Overview Signed 1/9/2018 10:12 AM by Deshaun Anderson DO     Last Assessment & Plan:   3 Norco scripts printed with do not fill before dates of 10/10, 11/9, 12/9. Tramadol prescription printed & given (decreased from 150 to 120 tablets per month based on how patient is taking it). Aware of risk of sedation and aware to get prescriptions only in our office. Discussed polypharmacy & fall risk with patient's multiple sedating medications, encouraged to limit use as much as possible. Will have her see Dr. Emmie Paniagua for next follow up to review pain medication regimen. Pt verbalized understanding, agreed with plan.                    Objective:     Patient Vitals for the past 24 hrs:   Temp Pulse Resp BP SpO2   01/27/22 1200 97.7 °F (36.5 °C) 89 -- 112/78 96 %   01/27/22 0920 -- -- -- -- 95 %   01/27/22 0804 -- -- -- -- 93 %   01/27/22 0800 -- 65 -- 108/64 (!) 89 %   01/27/22 0742 -- 71 -- 132/75 93 %   01/27/22 0737 -- 72 -- -- 90 %   01/27/22 0549 -- -- -- -- 91 %   01/26/22 2115 -- 85 27 110/68 92 %   01/26/22 2100 -- 85 26 119/77 92 %   01/26/22 1900 99.1 °F (37.3 °C) 85 23 (!) 96/57 91 %   01/26/22 1851 -- 86 23 (!) 96/59 91 %   01/26/22 1836 -- 86 24 104/63 91 %   01/26/22 1815 -- 89 25 110/68 95 %   01/26/22 1800 -- 92 27 112/70 91 %   01/26/22 1745 -- 93 -- 109/78 --   01/26/22 1701 -- -- -- -- 94 %   01/26/22 1530 -- 98 24 104/61 93 %   01/26/22 1505 97.8 °F (36.6 °C) -- -- -- --   01/26/22 1501 -- Chip Lozada 101 24 106/63 94 %   01/26/22 1430 -- (!) 101 27 (!) 91/56 95 %   01/26/22 1429 -- 100 26 -- 95 %   01/26/22 1423 -- 100 17 -- 96 %   01/26/22 1415 -- (!) 104 15 107/65 92 %   01/26/22 1400 -- (!) 105 25 98/62 95 %     Oxygen Therapy  O2 Sat (%): 96 % (01/27/22 1200)  Pulse via Oximetry: 89 beats per minute (01/27/22 1200)  O2 Device: Heated; Hi flow nasal cannula (01/27/22 0920)  Skin Assessment: Clean, dry, & intact (01/27/22 0549)  O2 Flow Rate (L/min): 55 l/min (01/27/22 0920)  O2 Temperature: 87.8 °F (31 °C) (01/27/22 0549)  FIO2 (%): 90 % (01/27/22 0920)    Estimated body mass index is 27.78 kg/m² as calculated from the following:    Height as of this encounter: 5' 1\" (1.549 m). Weight as of this encounter: 66.7 kg (147 lb). Intake/Output Summary (Last 24 hours) at 1/27/2022 1334  Last data filed at 1/27/2022 0656  Gross per 24 hour   Intake 240 ml   Output 500 ml   Net -260 ml         Physical Exam:     Blood pressure 112/78, pulse 89, temperature 97.7 °F (36.5 °C), resp. rate 27, height 5' 1\" (1.549 m), weight 66.7 kg (147 lb), SpO2 96 %. General:    Well nourished. No overt distress  Head:  Normocephalic, atraumatic  Eyes:  Sclerae appear normal.  Pupils equally round. ENT:  Nares appear normal, no drainage. Moist oral mucosa  Neck:  No restricted ROM. Trachea midline   CV:   RRR. No m/r/g. No jugular venous distension. Lungs:   Wheezing bilaterally  Abdomen: Bowel sounds present. Soft, nontender, nondistended. Extremities: No cyanosis or clubbing. No edema  Skin:     No rashes and normal coloration. Warm and dry. Neuro:  CN II-XII grossly intact. Sensation intact. A&Ox3  Psych:  Normal mood and affect.       I have reviewed ordered lab tests and independently visualized imaging below:    Recent Labs:  Recent Results (from the past 48 hour(s))   EKG, 12 LEAD, INITIAL    Collection Time: 01/26/22 12:51 PM   Result Value Ref Range    Ventricular Rate 115 BPM    Atrial Rate 116 BPM P-R Interval 143 ms    QRS Duration 92 ms    Q-T Interval 355 ms    QTC Calculation (Bezet) 491 ms    Calculated P Axis 49 degrees    Calculated R Axis -23 degrees    Calculated T Axis 46 degrees    Diagnosis       Sinus tachycardia  Borderline left axis deviation  Low voltage, precordial leads  Abnormal R-wave progression, late transition  Borderline repol abnrm, anterolateral leads  Borderline prolonged QT interval    Confirmed by Fabian Argueta MD (), JACKELINE LOVE ([id number]) on 1/26/2022 3:24:35 PM     CBC WITH AUTOMATED DIFF    Collection Time: 01/26/22 12:56 PM   Result Value Ref Range    WBC 7.0 4.3 - 11.1 K/uL    RBC 4.03 (L) 4.05 - 5.2 M/uL    HGB 11.6 (L) 11.7 - 15.4 g/dL    HCT 36.0 35.8 - 46.3 %    MCV 89.3 79.6 - 97.8 FL    MCH 28.8 26.1 - 32.9 PG    MCHC 32.2 31.4 - 35.0 g/dL    RDW 14.0 11.9 - 14.6 %    PLATELET 949 127 - 217 K/uL    MPV 9.4 9.4 - 12.3 FL    ABSOLUTE NRBC 0.00 0.0 - 0.2 K/uL    DF AUTOMATED      NEUTROPHILS 66 43 - 78 %    LYMPHOCYTES 21 13 - 44 %    MONOCYTES 12 4.0 - 12.0 %    EOSINOPHILS 0 (L) 0.5 - 7.8 %    BASOPHILS 0 0.0 - 2.0 %    IMMATURE GRANULOCYTES 1 0.0 - 5.0 %    ABS. NEUTROPHILS 4.6 1.7 - 8.2 K/UL    ABS. LYMPHOCYTES 1.5 0.5 - 4.6 K/UL    ABS. MONOCYTES 0.8 0.1 - 1.3 K/UL    ABS. EOSINOPHILS 0.0 0.0 - 0.8 K/UL    ABS. BASOPHILS 0.0 0.0 - 0.2 K/UL    ABS. IMM. GRANS. 0.1 0.0 - 0.5 K/UL   METABOLIC PANEL, COMPREHENSIVE    Collection Time: 01/26/22 12:56 PM   Result Value Ref Range    Sodium 143 136 - 145 mmol/L    Potassium 2.5 (L) 3.5 - 5.1 mmol/L    Chloride 106 98 - 107 mmol/L    CO2 25 21 - 32 mmol/L    Anion gap 12 7 - 16 mmol/L    Glucose 128 (H) 65 - 100 mg/dL    BUN 24 (H) 8 - 23 MG/DL    Creatinine 0.88 0.6 - 1.0 MG/DL    GFR est AA >60 >60 ml/min/1.73m2    GFR est non-AA >60 >60 ml/min/1.73m2    Calcium 8.3 8.3 - 10.4 MG/DL    Bilirubin, total 0.4 0.2 - 1.1 MG/DL    ALT (SGPT) 19 12 - 65 U/L    AST (SGOT) 59 (H) 15 - 37 U/L    Alk.  phosphatase 67 50 - 136 U/L    Protein, total 6.8 6.3 - 8.2 g/dL    Albumin 2.3 (L) 3.2 - 4.6 g/dL    Globulin 4.5 (H) 2.3 - 3.5 g/dL    A-G Ratio 0.5 (L) 1.2 - 3.5     MAGNESIUM    Collection Time: 01/26/22 12:56 PM   Result Value Ref Range    Magnesium 1.7 (L) 1.8 - 2.4 mg/dL   TROPONIN-HIGH SENSITIVITY    Collection Time: 01/26/22 12:56 PM   Result Value Ref Range    Troponin-High Sensitivity 1,544.2 (HH) 0 - 14 pg/mL   FERRITIN    Collection Time: 01/26/22 12:56 PM   Result Value Ref Range    Ferritin 273 8 - 388 NG/ML   LD    Collection Time: 01/26/22 12:56 PM   Result Value Ref Range     (H) 110 - 210 U/L   NT-PRO BNP    Collection Time: 01/26/22 12:58 PM   Result Value Ref Range    NT pro-BNP 3,481 (H) <450 PG/ML   COVID-19 RAPID TEST    Collection Time: 01/26/22 12:58 PM   Result Value Ref Range    Specimen source NASAL      COVID-19 rapid test Detected (AA) NOTD     LACTIC ACID    Collection Time: 01/26/22 12:58 PM   Result Value Ref Range    Lactic acid 3.9 (HH) 0.4 - 2.0 MMOL/L   C REACTIVE PROTEIN, QT    Collection Time: 01/26/22 12:58 PM   Result Value Ref Range    C-Reactive protein 17.3 (H) 0.0 - 0.9 mg/dL   C REACTIVE PROTEIN, QT    Collection Time: 01/26/22  1:00 PM   Result Value Ref Range    C-Reactive protein 17.3 (H) 0.0 - 0.9 mg/dL   D DIMER    Collection Time: 01/26/22  1:00 PM   Result Value Ref Range    D DIMER 3.85 (H) <0.56 ug/ml(FEU)   TROPONIN-HIGH SENSITIVITY    Collection Time: 01/26/22  3:04 PM   Result Value Ref Range    Troponin-High Sensitivity 1,609.8 (HH) 0 - 14 pg/mL   LACTIC ACID    Collection Time: 01/26/22  3:04 PM   Result Value Ref Range    Lactic acid 3.5 (H) 0.4 - 2.0 MMOL/L   PROCALCITONIN    Collection Time: 01/26/22  3:04 PM   Result Value Ref Range    Procalcitonin 6.15 (H) 0.00 - 0.49 ng/mL   TROPONIN-HIGH SENSITIVITY    Collection Time: 01/26/22  6:14 PM   Result Value Ref Range    Troponin-High Sensitivity 1,334.5 (HH) 0 - 14 pg/mL   TROPONIN-HIGH SENSITIVITY    Collection Time: 01/26/22 10:00 PM Result Value Ref Range    Troponin-High Sensitivity 1,004.9 (HH) 0 - 14 pg/mL   LACTIC ACID    Collection Time: 01/26/22 10:00 PM   Result Value Ref Range    Lactic acid 2.1 (H) 0.4 - 2.0 MMOL/L   VITAMIN D, 25 HYDROXY    Collection Time: 01/27/22  3:15 AM   Result Value Ref Range    Vitamin D 25-Hydroxy 35.5 30.0 - 100.0 ng/mL   PROCALCITONIN    Collection Time: 01/27/22  3:15 AM   Result Value Ref Range    Procalcitonin 4.84 (H) 0.00 - 4.63 ng/mL   METABOLIC PANEL, COMPREHENSIVE    Collection Time: 01/27/22  3:15 AM   Result Value Ref Range    Sodium 143 136 - 145 mmol/L    Potassium 3.2 (L) 3.5 - 5.1 mmol/L    Chloride 108 (H) 98 - 107 mmol/L    CO2 31 21 - 32 mmol/L    Anion gap 4 (L) 7 - 16 mmol/L    Glucose 154 (H) 65 - 100 mg/dL    BUN 27 (H) 8 - 23 MG/DL    Creatinine 0.62 0.6 - 1.0 MG/DL    GFR est AA >60 >60 ml/min/1.73m2    GFR est non-AA >60 >60 ml/min/1.73m2    Calcium 8.4 8.3 - 10.4 MG/DL    Bilirubin, total 0.3 0.2 - 1.1 MG/DL    ALT (SGPT) 15 12 - 65 U/L    AST (SGOT) 52 (H) 15 - 37 U/L    Alk. phosphatase 62 50 - 136 U/L    Protein, total 5.9 (L) 6.3 - 8.2 g/dL    Albumin 2.0 (L) 3.2 - 4.6 g/dL    Globulin 3.9 (H) 2.3 - 3.5 g/dL    A-G Ratio 0.5 (L) 1.2 - 3.5     CBC WITH AUTOMATED DIFF    Collection Time: 01/27/22  3:15 AM   Result Value Ref Range    WBC 5.6 4.3 - 11.1 K/uL    RBC 3.74 (L) 4.05 - 5.2 M/uL    HGB 10.8 (L) 11.7 - 15.4 g/dL    HCT 33.6 (L) 35.8 - 46.3 %    MCV 89.8 79.6 - 97.8 FL    MCH 28.9 26.1 - 32.9 PG    MCHC 32.1 31.4 - 35.0 g/dL    RDW 14.3 11.9 - 14.6 %    PLATELET 225 764 - 221 K/uL    MPV 9.4 9.4 - 12.3 FL    ABSOLUTE NRBC 0.00 0.0 - 0.2 K/uL    DF AUTOMATED      NEUTROPHILS 76 43 - 78 %    LYMPHOCYTES 16 13 - 44 %    MONOCYTES 8 4.0 - 12.0 %    EOSINOPHILS 0 (L) 0.5 - 7.8 %    BASOPHILS 0 0.0 - 2.0 %    IMMATURE GRANULOCYTES 1 0.0 - 5.0 %    ABS. NEUTROPHILS 4.2 1.7 - 8.2 K/UL    ABS. LYMPHOCYTES 0.9 0.5 - 4.6 K/UL    ABS. MONOCYTES 0.4 0.1 - 1.3 K/UL    ABS.  EOSINOPHILS 0.0 0.0 - 0.8 K/UL    ABS. BASOPHILS 0.0 0.0 - 0.2 K/UL    ABS. IMM. GRANS. 0.1 0.0 - 0.5 K/UL   D DIMER    Collection Time: 01/27/22  3:15 AM   Result Value Ref Range    D DIMER 3.94 (H) <0.56 ug/ml(FEU)   C REACTIVE PROTEIN, QT    Collection Time: 01/27/22  3:15 AM   Result Value Ref Range    C-Reactive protein 15.0 (H) 0.0 - 0.9 mg/dL   MAGNESIUM    Collection Time: 01/27/22  3:15 AM   Result Value Ref Range    Magnesium 2.5 (H) 1.8 - 2.4 mg/dL   TROPONIN-HIGH SENSITIVITY    Collection Time: 01/27/22  3:15 AM   Result Value Ref Range    Troponin-High Sensitivity 663.0 (HH) 0 - 14 pg/mL       All Micro Results     Procedure Component Value Units Date/Time    COVID-19 RAPID TEST [[id number]]  (Abnormal) Collected: 01/26/22 1258    Order Status: Completed Specimen: Nasopharyngeal Updated: 01/26/22 1316     Specimen source NASAL        COVID-19 rapid test Detected        Comment:      The specimen is POSITIVE for SARS-CoV-2, the novel coronavirus associated with COVID-19. This test has been authorized by the FDA under an Emergency Use Authorization (EUA) for use by authorized laboratories. Fact sheet for Healthcare Providers: ConventionUpdate.co.nz  Fact sheet for Patients: ConventionUpdate.co.nz       Methodology: Isothermal Nucleic Acid Amplification               Other Studies:  CT CHEST PULMONARY EMBOLISM    Result Date: 1/26/2022  CHEST CT ANGIOGRAPHY WITH ADDITIONAL REFORMATS:  CLINICAL HISTORY:  Shortness of breath with elevated d-dimer and clinical concern for pulmonary embolism. Covid-positive today with lactic acidemia, elevated C-reactive protein, and elevated BNP. TECHNIQUE:  During bolus injection of nonionic intravenous contrast, the chest was scanned with spiral technique, and coronal reformats were produced.  Radiation dose reduction was achieved using one or all of the following techniques: automated exposure control, weight-based dosing, iterative reconstruction. COMPARISON:  Portable chest today. FINDINGS:  There is expected opacification of the pulmonary arterial tree with no intraluminal soft tissue density to suggest acute pulmonary embolism. There is no definite pneumothorax. Extensive bilateral inhomogeneous infiltrates with peripheral predominance and no significant pleural fluid are nonspecific but typical of Covid pneumonia. No pathologically enlarged lymph nodes or abnormal fluid collection is seen. There is a moderate hiatal hernia. The epigastrium appears unremarkable as imaged. 1.  NO DEFINITE ACUTE PULMONARY EMBOLISM. 2.  EXTENSIVE BILATERAL INHOMOGENEOUS INFILTRATES WITH PERIPHERAL PREDOMINANCE ARE NONSPECIFIC BUT TYPICAL OF COVID PNEUMONIA. 3.  MODERATE HIATAL HERNIA.       Current Meds:  Current Facility-Administered Medications   Medication Dose Route Frequency    sodium chloride (NS) flush 5-10 mL  5-10 mL IntraVENous Q8H    sodium chloride (NS) flush 5-10 mL  5-10 mL IntraVENous PRN    sodium chloride (NS) flush 5-40 mL  5-40 mL IntraVENous Q8H    sodium chloride (NS) flush 5-40 mL  5-40 mL IntraVENous PRN    acetaminophen (TYLENOL) tablet 650 mg  650 mg Oral Q6H PRN    Or    acetaminophen (TYLENOL) suppository 650 mg  650 mg Rectal Q6H PRN    polyethylene glycol (MIRALAX) packet 17 g  17 g Oral DAILY PRN    ondansetron (ZOFRAN ODT) tablet 4 mg  4 mg Oral Q8H PRN    Or    ondansetron (ZOFRAN) injection 4 mg  4 mg IntraVENous Q6H PRN    tuberculin injection 5 Units  5 Units IntraDERMal ONCE    heparin (porcine) injection 5,000 Units  5,000 Units SubCUTAneous Q8H    guaiFENesin-dextromethorphan (ROBITUSSIN DM) 100-10 mg/5 mL syrup 5 mL  5 mL Oral Q4H PRN    dexamethasone (DECADRON) 10 mg/mL injection 6 mg  6 mg IntraVENous Q24H    cholecalciferol (VITAMIN D3) (1000 Units /25 mcg) tablet 2,000 Units  2,000 Units Oral DAILY    alum-mag hydroxide-simeth (MYLANTA) oral suspension 15 mL  15 mL Oral Q6H PRN    pantoprazole (PROTONIX) tablet 40 mg  40 mg Oral ACB    albuterol (PROVENTIL HFA, VENTOLIN HFA, PROAIR HFA) inhaler 2 Puff  2 Puff Inhalation Q4H PRN    potassium chloride (K-DUR, KLOR-CON M20) SR tablet 40 mEq  40 mEq Oral Q6H    atorvastatin (LIPITOR) tablet 40 mg  40 mg Oral QHS    gabapentin (NEURONTIN) capsule 300 mg  300 mg Oral TID PRN    temazepam (RESTORIL) capsule 15 mg  15 mg Oral QHS PRN     Current Outpatient Medications   Medication Sig    dexlansoprazole (Dexilant) 60 mg CpDB capsule (delayed release) Take 60 mg by mouth Daily (before breakfast).  suvorexant (BELSOMRA) 20 mg tablet Take 1 Tab by mouth nightly as needed for Insomnia. Max Daily Amount: 20 mg.    doxepin (SINEQUAN) 10 mg capsule Take 1 Cap by mouth nightly.  simvastatin (ZOCOR) 20 mg tablet Take 1 Tab by mouth nightly.  NIFEdipine ER (PROCARDIA XL) 30 mg ER tablet Take 1 Tab by mouth daily.  garlic 7,688 mg cap Take  by mouth.  b complex vitamins tablet Take 1 Tab by mouth daily.  traMADol (ULTRAM) 50 mg tablet Take 2 Tabs by mouth every eight (8) hours as needed. Max Daily Amount: 300 mg.    baclofen (LIORESAL) 10 mg tablet Take 1 Tab by mouth nightly as needed. Indications: Muscle Spasticity of Spinal Origin    pantoprazole (PROTONIX) 40 mg tablet Take 1 Tab by mouth Daily (before breakfast).  gabapentin (NEURONTIN) 800 mg tablet Take 1 Tab by mouth two (2) times a day.  triamterene-hydroCHLOROthiazide (MAXZIDE-25MG) 37.5-25 mg per tablet Take 1 Tab by mouth daily.  cinnamon bark (CINNAMON) 500 mg cap Take 500 mg by mouth two (2) times a day.  OTHER,NON-FORMULARY, Indications: hair/nail supplement    OTHER,NON-FORMULARY, Indications: Brain health    fish oil-omega-3 fatty acids (FISH OIL) 340-1,000 mg capsule Take 1 Cap by mouth daily. Indications: 1200 mg    calcium-cholecalciferol, d3, (CALCIUM 600 + D) 600-125 mg-unit tab Take  by mouth.  aspirin delayed-release 81 mg tablet Take  by mouth daily.     Alpha Lipoic Acid 600 mg cap Take  by mouth daily.  potassium 99 mg tablet Take 99 mg by mouth daily.  coenzyme q10-vitamin e (COQ10 ) 100-100 mg-unit cap Take  by mouth daily.  ginkgo biloba (GINKGO) 60 mg Tab Take 120 mg by mouth daily.  timolol (TIMOPTIC) 0.5 % ophthalmic solution Administer 1 Drop to left eye daily. Take DOS per anesthesia protocol.  multivitamin (ONE A DAY) tablet Take 1 Tab by mouth daily.  pyridoxine (VITAMIN B-6) 200 mg tablet Take 500 mg by mouth daily.  sodium chloride (SALINEX) 0.4 % nasal spray 1 Spray as needed. Signed:  Vicki Esqueda DO    Part of this note may have been written by using a voice dictation software. The note has been proof read but may still contain some grammatical/other typographical errors.

## 2022-01-27 NOTE — PROGRESS NOTES
TRANSFER - IN REPORT:    [facility] for routine progression of care      Report consisted of patients Situation, Background, Assessment and   Recommendations(SBAR). Information from the following report(s) SBAR and ED Summary was reviewed with the receiving nurse. Opportunity for questions and clarification was provided. Assessment completed upon patients arrival to unit and care assumed.

## 2022-01-27 NOTE — PROGRESS NOTES
ACUTE PHYSICAL THERAPY GOALS:  (Developed with and agreed upon by patient and/or caregiver. )    LTG:  (1.)Ms. Fitzgerald will move from supine to sit and sit to supine , scoot up and down and roll side to side in flat bed without siderails with  INDEPENDENT within 7 day(s). (2.)Ms. Fitzgerald will perform all functional transfers with  MODIFIED INDEPENDENCE using the least restrictive/no device within 7 day(s). (3.)Ms. Fitzgerald will ambulate with  CONTACT GUARD ASSIST for 100+ feet with normal vital sign response with the least restrictive/no device within 7 day(s). PHYSICAL THERAPY ASSESSMENT: Initial Assessment and Daily Note PT Treatment Day # 1      Pam Jones is a 80 y.o. female   PRIMARY DIAGNOSIS: Acute hypoxemic respiratory failure due to COVID-19 [facility])  Acute hypoxemic respiratory failure due to COVID-19 [facility] [U07.1, J96.01]       Reason for Referral:     ICD-10: Treatment Diagnosis: Other abnormalities of gait and mobility (R26.89)  INPATIENT: Payor: [employer]ICARE / Plan: [address] / Product Type: Managed Care Medicare /     ASSESSMENT:     REHAB RECOMMENDATIONS:   Recommendation to date pending progress:  [address] [place] [address]  Equipment:    To Be Determined     PRIOR LEVEL OF FUNCTION:  (Prior to Hospitalization) INITIAL/CURRENT LEVEL OF FUNCTION:  (Most Recently Demonstrated)   Bed Mobility:   Independent  Sit to Stand:   Independent  Transfers:   Independent  Gait/Mobility:   Independent Bed Mobility:   Contact Guard Assistance  Sit to Stand:   Minimal Assistance  Transfers:   Minimal Assistance  Gait/Mobility:   Minimal Assistance     ASSESSMENT:  Ms. Raoul Ayala lives with her , son, and DIL. She ambulates independently and is primarily homebound. Today she is in the ED on Airvo and uncomfortable lying on her \"bed\".   During standing and ambulating she was unsteady and required min A.  SpO2 remained >90% during most of mobility, but did drop briefly to upper 80's after several minutes of standing activity. Patient has declined in functional mobility. Ms. Valentina Rush would benefit from skilled physical therapy (medically necessary) to address her deficits and maximize her function. .     SUBJECTIVE:   Ms. Valentina Rush states, \"Thank you Elo\"    SOCIAL HISTORY/LIVING ENVIRONMENT: Ms. Valentina Rush lives with her , son, and DIL. She ambulates independently and is primarily homebound.      OBJECTIVE:     PAIN: VITAL SIGNS: LINES/DRAINS:   Pre Treatment: Pain Screen  Pain Scale 1: Numeric (0 - 10)  Pain Intensity 1: 0  Post Treatment: 0   Continuous Pulse Oximetry and Purewick  O2 Device: Heated,Hi flow nasal cannula     GROSS EVALUATION:  B LE's Within Functional Limits Abnormal/ Functional Abnormal/ Non-Functional (see comments) Not Tested Comments:   AROM [x] [] [] []    PROM [] [] [] []    Strength [x] [] [] []    Balance [] [x] [] []    Posture [] [x] [] []    Sensation [x] [] [] []    Coordination [] [] [] []    Tone [x] [] [] []    Edema [] [] [] []    Activity Tolerance [] [] [x] []     [] [] [] []      COGNITION/  PERCEPTION: Intact Impaired   (see comments) Comments:   Orientation [x] []    Vision [] []    Hearing [x] []    Command Following [x] []    Safety Awareness [x] []     [] []      MOBILITY: I Mod I S SBA CGA Min Mod Max Total  NT x2 Comments:   Bed Mobility    Rolling [] [] [] [] [x] [] [] [] [] [] []    Supine to Sit [] [] [] [] [x] [] [] [] [] [] []    Scooting [] [] [] [] [x] [] [] [] [] [] []    Sit to Supine [] [] [] [] [] [] [] [] [] [] []    Transfers    Sit to Stand [] [] [] [] [] [x] [] [] [] [] []    Bed to Chair [] [] [] [] [] [x] [] [] [] [] []    Stand to Sit [] [] [] [] [] [x] [] [] [] [] []    I=Independent, Mod I=Modified Independent, S=Supervision, SBA=Standby Assistance, CGA=Contact Guard Assistance,   Min=Minimal Assistance, Mod=Moderate Assistance, Max=Maximal Assistance, Total=Total Assistance, NT=Not Tested  GAIT: I Mod I S SBA CGA Min Mod Max Total  NT x2 Comments:   Level of Assistance [] [] [] [] [] [x] [] [] [] [] []    Distance 4'    DME None    Gait Quality Slow, short, shuffling steps    Weightbearing Status N/A     I=Independent, Mod I=Modified Independent, S=Supervision, SBA=Standby Assistance, CGA=Contact Guard Assistance,   Min=Minimal Assistance, Mod=Moderate Assistance, Max=Maximal Assistance, Total=Total Assistance, NT=Not Tested    [place] Form       How much difficulty does the patient currently have. .. Unable A Lot A Little None   1. Turning over in bed (including adjusting bedclothes, sheets and blankets)? [] 1   [] 2   [x] 3   [] 4   2. Sitting down on and standing up from a chair with arms ( e.g., wheelchair, bedside commode, etc.)   [] 1   [] 2   [x] 3   [] 4   3. Moving from lying on back to sitting on the side of the bed? [] 1   [] 2   [x] 3   [] 4   How much help from another person does the patient currently need. .. Total A Lot A Little None   4. Moving to and from a bed to a chair (including a wheelchair)? [] 1   [] 2   [x] 3   [] 4   5. Need to walk in hospital room? [] 1   [] 2   [x] 3   [] 4   6. Climbing 3-5 steps with a railing? [] 1   [] 2   [x] 3   [] 4   © 2007, Trustees of [address] [place], [place] [id number], under license to [employer]. All rights reserved     Score:  Initial: 18 Most Recent: X (Date: -- )    Interpretation of Tool:  Represents activities that are increasingly more difficult (i.e. Bed mobility, Transfers, Gait). PLAN:   FREQUENCY/DURATION: PT Plan of Care: 3 times/week for duration of hospital stay or until stated goals are met, whichever comes first.    PROBLEM LIST:   (Skilled intervention is medically necessary to address:)  1. Decreased ADL/Functional Activities  2. Decreased Activity Tolerance  3. Decreased Balance  4. Decreased Gait Ability  5.  Decreased Transfer Abilities   INTERVENTIONS PLANNED:   (Benefits and precautions of physical therapy have been discussed with the patient.)  1. Therapeutic Activity  2. Therapeutic Exercise/HEP  3. Neuromuscular Re-education  4. Gait Training  5. Manual Therapy  6. Education     TREATMENT:     EVALUATION: Moderate Complexity : (Untimed Charge)    TREATMENT:   ($$ Therapeutic Activity: 8-22 mins    )  Therapeutic Activity (10 Minutes): Therapeutic activity included Supine to Sit, Scooting, Transfer Training, Ambulation on level ground and Standing balance to improve functional Mobility, Strength and Activity tolerance.     TREATMENT GRID:  N/A    AFTER TREATMENT POSITION/PRECAUTIONS:  Chair, Needs within reach and RN notified    INTERDISCIPLINARY COLLABORATION:  RN/PCT, PT/PTA and OT/ROMERO    TOTAL TREATMENT DURATION:  PT Patient Time In/Time Out  Time In: 1128  Time Out: 1015 Bruna Early PT, DPT

## 2022-01-27 NOTE — ACP (ADVANCE CARE PLANNING)
[facility]  At the heart of better care     Advance Care Planning   Admit Date:  2022 12:32 PM   Name:  Luli Kennedy   Age:  80 y.o. Sex:  female  :  1935   MRN:  [id number]   Room:  [place] [address] Amilcar is able to make her own decisions: Yes    If pt unable to make decisions, POA/surrogate decision maker:  Mahogany Grover (her son)     Patient / surrogate decision-maker directed:  Code Status: FULL CODE -full aggressive medical and surgical interventions, including intubations, resuscitations, pressors, artificial tube feeding      Patient or surrogate consented to discussion of the current conditions, workup, management plans, prognosis, and understand the risk for further deterioration. Time spent: 14 minutes in direct discussion (face to face and/or over phone).     Signed:  Ella Gomez DO

## 2022-01-27 NOTE — PROGRESS NOTES
ACUTE OT GOALS:  (Developed with and agreed upon by patient and/or caregiver.)  1) Patient will complete upper body bathing and dressing with SET UP and adaptive equipment as needed. 2) Patient will complete lower body bathing and dressing with SBA and adaptive equipment as needed. 2) Patient will complete toileting with SBA. 3) Patient will complete functional transfers with SUPERVISION and adaptive equipment as needed. 4) Patient will tolerate at least 25 minutes of OT activity with 1-2 rest breaks while maintaining O2 sats >90%. 5) Patient will verbalize at least 3 energy conservation technique to utilize during ADL/IADL.    Timeframe: 7 visits     OCCUPATIONAL THERAPY ASSESSMENT: Initial Assessment and Daily Note OT Treatment Day # 1    Estrella Baugh is a 80 y.o. female   PRIMARY DIAGNOSIS: Acute hypoxemic respiratory failure due to COVID-19 [facility])  Acute hypoxemic respiratory failure due to [facility] [U07.1, J96.01]       Reason for Referral:    ICD-10: Treatment Diagnosis: Generalized Muscle Weakness (M62.81)  Difficulty in walking, Not elsewhere classified (R26.2)  Other abnormalities of gait and mobility (R26.89)  INPATIENT: Payor: [facility] MEDICARE / Plan: [address] / Product Type: Managed Care Medicare /   ASSESSMENT:     REHAB RECOMMENDATIONS:   Recommendation to date pending progress:  Setting:   Short-term Rehab vs. [facility] pending O2 needs and LOS  Equipment:    To Be Determined     PRIOR LEVEL OF FUNCTION:  (Prior to Hospitalization)  INITIAL/CURRENT LEVEL OF FUNCTION:  (Based on today's evaluation)   Bathing:   Modified Independent lays in tub  Dressing:   Independent  Feeding/Grooming:   Independent  Toileting:   Independent  Functional Mobility:   Modified Independent SPC Bathing:   Moderate Assistance  Dressing:   Minimal Assistance  Feeding/Grooming:   Set Up  Toileting:   Moderate Assistance  Functional Mobility:   Minimal Assistance bed mobility     ASSESSMENT:  Ms. Harpal Melchor is an 81 y/o female presents with acute respiratory failure due to COVID 19. Today pt presents with decreased activity tolerance, balance and strength impacting ADLs. Pt resting in ER and currently on 55L 90% Airvo and sats >90% at rest. Pt does not wear home O2. Pt overall min A for bed mobility and unable to stand today due to pt height and tall stretcher beds in ER. Pt completed other grooming and LE dressing ADLs EOB in grid below with CGA for sitting balance. Pt required supine<>sit transfer x3 trials due to decreasing O2 sats/activity tolerance. Pt educated on pursed lip breathing and energy conservation techniques in prep for ADLs. Pt O2 sats ranged 87-95% throughout. Pt is currently functioning below baseline and would benefit from skilled OT services to address OT goals and plan of care. SUBJECTIVE:   Ms. Harpal Melchor states, \"I will do whatever I need to get home\"    SOCIAL HISTORY/LIVING ENVIRONMENT: lives with  and son/DIL, one level, uses SPC, tub shower no AD (lays in tub to bathe), does not endorse falls, does not drive       OBJECTIVE:     PAIN: VITAL SIGNS: LINES/DRAINS:   Pre Treatment: Pain Screen  Pain Scale 1: Numeric (0 - 10)  Pain Intensity 1: 0  Post Treatment: 0 Vital Signs  O2 Sat (%): 95 %  O2 Device: Heated; Hi flow nasal cannula  O2 Flow Rate (L/min): 55 l/min  FIO2 (%): 90 % Continuous Pulse Oximetry and Purewick  O2 Device: Heated,Hi flow nasal cannula     GROSS EVALUATION:  BUE Within Functional Limits Abnormal/ Functional Abnormal/ Non-Functional (see comments) Not Tested Comments:   AROM [] [x] [] []    PROM [x] [] [] []    Strength [] [x] [] [] Generally decreased   Balance [] [x] [] [] Sitting: fair  Standing: not tested   Posture [] [x] [] [] Rounded shoulders and posterior lean   Sensation [x] [] [] []    Coordination [] [x] [] [] Delayed/shaky BUE   Tone [] [] [] [x]    Edema [] [] [] [x]    Activity Tolerance [] [x] [] [] 55L 90% Airvo    [] [] [] []      COGNITION/  PERCEPTION: Intact Impaired   (see comments) Comments:   Orientation [x] []    Vision [] [x] Glaucoma R eye, macular degeneration L eye   Hearing [x] []    Judgment/ Insight [x] []    Attention [x] []    Memory [x] []    Command Following [x] []    Emotional Regulation [x] []     [] []      ACTIVITIES OF DAILY LIVING: I Mod I S SBA CGA Min Mod Max Total NT Comments   BASIC ADLs:              Bathing/ Showering [] [] [] [] [] [] [] [] [] [x]    Toileting [] [] [] [] [] [] [] [] [] [x]    Dressing [] [] [] [] [] [x] [] [] [] [] [place] socks EOB   Feeding [] [] [] [] [] [] [] [] [] [x]    Grooming [] [] [x] [] [x] [] [] [] [] [] Set up brushing teeth and combing hair EOB, CGA for dynamic sitting balance   Personal Device Care [] [] [] [] [] [] [] [] [] [x]    Functional Mobility [] [] [] [] [] [x] [] [] [] [] Bed mobility   I=Independent, Mod I=Modified Independent, S=Supervision, SBA=Standby Assistance, CGA=Contact Guard Assistance,   Min=Minimal Assistance, Mod=Moderate Assistance, Max=Maximal Assistance, Total=Total Assistance, NT=Not Tested    MOBILITY: I Mod I S SBA CGA Min Mod Max Total  NT x2 Comments:   Supine to sit [] [] [] [] [] [x] [] [] [] [] []    Sit to supine [] [] [] [] [] [x] [] [] [] [] []    Sit to stand [] [] [] [] [] [] [] [] [] [x] []    Bed to chair [] [] [] [] [] [] [] [] [] [x] []    I=Independent, Mod I=Modified Independent, S=Supervision, SBA=Standby Assistance, CGA=Contact Guard Assistance,   Min=Minimal Assistance, Mod=Moderate Assistance, Max=Maximal Assistance, Total=Total Assistance, NT=Not Tested    [facility] AM-PAC 6 Clicks   Daily Activity Inpatient Short Form        How much help from another person does the patient currently need. .. Total A Lot A Little None   1. Putting on and taking off regular lower body clothing? [] 1   [x] 2   [] 3   [] 4   2. Bathing (including washing, rinsing, drying)?    [] 1   [x] 2   [] 3   [] 4   3.  Toileting, which includes using toilet, bedpan or urinal?   [] 1   [x] 2   [] 3   [] 4   4. Putting on and taking off regular upper body clothing? [] 1   [] 2   [x] 3   [] 4   5. Taking care of personal grooming such as brushing teeth? [] 1   [] 2   [x] 3   [] 4   6. Eating meals? [] 1   [] 2   [] 3   [x] 4   © 2007, Trustees of [facility] [employer], under license to [employer]. All rights reserved     Score:  Initial: 16 Most Recent: X (Date: -- )   Interpretation of Tool:  Represents activities that are increasingly more difficult (i.e. Bed mobility, Transfers, Gait). PLAN:   FREQUENCY/DURATION: OT Plan of Care: 3 times/week for duration of hospital stay or until stated goals are met, whichever comes first.    PROBLEM LIST:   (Skilled intervention is medically necessary to address:)  1. Decreased ADL/Functional Activities  2. Decreased Activity Tolerance  3. Decreased AROM/PROM  4. Decreased Balance  5. Decreased Coordination  6. Decreased Strength  7. Decreased Transfer Abilities   INTERVENTIONS PLANNED:   (Benefits and precautions of occupational therapy have been discussed with the patient.)  1. Self Care Training  2. Therapeutic Activity  3. Therapeutic Exercise/HEP  4. Neuromuscular Re-education  5. Education     TREATMENT:     EVALUATION: Low Complexity : (Untimed Charge)    TREATMENT:   ($$ Self Care/Home Management: 8-22 mins$$ Neuromuscular Re-Education: 8-22 mins   )  Self Care (13 Minutes): Self care including Lower Body Dressing, Grooming, Energy Conservation Training and functional transfers in prep for ADLs to increase independence and decrease level of assistance required. Neuromuscular Re-education (10 Minutes): Neuromuscular Re-education included Balance Training, Functional mobility with facilitation, Postural training and Sitting balance training to improve Balance, Functional Mobility and Postural Control.     TREATMENT GRID:  N/A    AFTER TREATMENT POSITION/PRECAUTIONS:  Bed, Needs within reach and RN notified    INTERDISCIPLINARY COLLABORATION:  RN/PCT and OT/ROMERO    TOTAL TREATMENT DURATION:  OT Patient Time In/Time Out  Time In: 9198  Time Out: [address], OT

## 2022-01-28 LAB
ANION GAP SERPL CALC-SCNC: 6 MMOL/L (ref 7–16)
APPEARANCE UR: ABNORMAL
BACTERIA URNS QL MICRO: ABNORMAL /HPF
BILIRUB UR QL: ABNORMAL
BUN SERPL-MCNC: 36 MG/DL (ref 8–23)
CALCIUM SERPL-MCNC: 8.5 MG/DL (ref 8.3–10.4)
CHLORIDE SERPL-SCNC: 110 MMOL/L (ref 98–107)
CO2 SERPL-SCNC: 28 MMOL/L (ref 21–32)
COLOR UR: ABNORMAL
CREAT SERPL-MCNC: 0.65 MG/DL (ref 0.6–1)
CRP SERPL-MCNC: 7.2 MG/DL (ref 0–0.9)
D DIMER PPP FEU-MCNC: 3.4 UG/ML(FEU)
EPI CELLS #/AREA URNS HPF: ABNORMAL /HPF
GLUCOSE BLD STRIP.AUTO-MCNC: 118 MG/DL (ref 65–100)
GLUCOSE SERPL-MCNC: 113 MG/DL (ref 65–100)
GLUCOSE UR STRIP.AUTO-MCNC: NEGATIVE MG/DL
HGB UR QL STRIP: NEGATIVE
KETONES UR QL STRIP.AUTO: 40 MG/DL
LEUKOCYTE ESTERASE UR QL STRIP.AUTO: NEGATIVE
MAGNESIUM SERPL-MCNC: 2.2 MG/DL (ref 1.8–2.4)
NITRITE UR QL STRIP.AUTO: NEGATIVE
OTHER OBSERVATIONS,UCOM: ABNORMAL
PH UR STRIP: 6 [PH] (ref 5–9)
POTASSIUM SERPL-SCNC: 4.4 MMOL/L (ref 3.5–5.1)
PROT UR STRIP-MCNC: 100 MG/DL
RBC #/AREA URNS HPF: ABNORMAL /HPF
SERVICE CMNT-IMP: ABNORMAL
SODIUM SERPL-SCNC: 144 MMOL/L (ref 136–145)
SP GR UR REFRACTOMETRY: 1.04 (ref 1–1.02)
UROBILINOGEN UR QL STRIP.AUTO: 0.2 EU/DL (ref 0.2–1)
WBC URNS QL MICRO: ABNORMAL /HPF

## 2022-01-28 PROCEDURE — 94762 N-INVAS EAR/PLS OXIMTRY CONT: CPT

## 2022-01-28 PROCEDURE — 74011000250 HC RX REV CODE- 250: Performed by: FAMILY MEDICINE

## 2022-01-28 PROCEDURE — 77010033711 HC HIGH FLOW OXYGEN

## 2022-01-28 PROCEDURE — 87086 URINE CULTURE/COLONY COUNT: CPT

## 2022-01-28 PROCEDURE — 81001 URINALYSIS AUTO W/SCOPE: CPT

## 2022-01-28 PROCEDURE — 97530 THERAPEUTIC ACTIVITIES: CPT

## 2022-01-28 PROCEDURE — 87186 SC STD MICRODIL/AGAR DIL: CPT

## 2022-01-28 PROCEDURE — 83735 ASSAY OF MAGNESIUM: CPT

## 2022-01-28 PROCEDURE — 80048 BASIC METABOLIC PNL TOTAL CA: CPT

## 2022-01-28 PROCEDURE — 74011250636 HC RX REV CODE- 250/636: Performed by: FAMILY MEDICINE

## 2022-01-28 PROCEDURE — 99232 SBSQ HOSP IP/OBS MODERATE 35: CPT | Performed by: INTERNAL MEDICINE

## 2022-01-28 PROCEDURE — 74011250637 HC RX REV CODE- 250/637: Performed by: FAMILY MEDICINE

## 2022-01-28 PROCEDURE — 86140 C-REACTIVE PROTEIN: CPT

## 2022-01-28 PROCEDURE — 87088 URINE BACTERIA CULTURE: CPT

## 2022-01-28 PROCEDURE — XW0DXM6 INTRODUCTION OF BARICITINIB INTO MOUTH AND PHARYNX, EXTERNAL APPROACH, NEW TECHNOLOGY GROUP 6: ICD-10-PCS | Performed by: STUDENT IN AN ORGANIZED HEALTH CARE EDUCATION/TRAINING PROGRAM

## 2022-01-28 PROCEDURE — 65660000000 HC RM CCU STEPDOWN

## 2022-01-28 PROCEDURE — 85379 FIBRIN DEGRADATION QUANT: CPT

## 2022-01-28 PROCEDURE — 36415 COLL VENOUS BLD VENIPUNCTURE: CPT

## 2022-01-28 PROCEDURE — 74011250637 HC RX REV CODE- 250/637: Performed by: STUDENT IN AN ORGANIZED HEALTH CARE EDUCATION/TRAINING PROGRAM

## 2022-01-28 PROCEDURE — 82962 GLUCOSE BLOOD TEST: CPT

## 2022-01-28 PROCEDURE — 74011000250 HC RX REV CODE- 250: Performed by: STUDENT IN AN ORGANIZED HEALTH CARE EDUCATION/TRAINING PROGRAM

## 2022-01-28 RX ORDER — METOPROLOL TARTRATE 25 MG/1
12.5 TABLET, FILM COATED ORAL EVERY 12 HOURS
Status: DISCONTINUED | OUTPATIENT
Start: 2022-01-28 | End: 2022-01-29

## 2022-01-28 RX ORDER — INSULIN LISPRO 100 [IU]/ML
INJECTION, SOLUTION INTRAVENOUS; SUBCUTANEOUS
Status: DISCONTINUED | OUTPATIENT
Start: 2022-01-28 | End: 2022-01-31

## 2022-01-28 RX ADMIN — VITAMIN D, TAB 1000IU (100/BT) 2000 UNITS: 25 TAB at 09:52

## 2022-01-28 RX ADMIN — DEXAMETHASONE SODIUM PHOSPHATE 6 MG: 10 INJECTION, SOLUTION INTRAMUSCULAR; INTRAVENOUS at 09:52

## 2022-01-28 RX ADMIN — SODIUM CHLORIDE, PRESERVATIVE FREE 10 ML: 5 INJECTION INTRAVENOUS at 21:21

## 2022-01-28 RX ADMIN — DEXLANSOPRAZOLE 60 MG: 60 CAPSULE, DELAYED RELEASE ORAL at 10:02

## 2022-01-28 RX ADMIN — HEPARIN SODIUM 5000 UNITS: 5000 INJECTION INTRAVENOUS; SUBCUTANEOUS at 21:21

## 2022-01-28 RX ADMIN — SODIUM CHLORIDE, PRESERVATIVE FREE 10 ML: 5 INJECTION INTRAVENOUS at 06:10

## 2022-01-28 RX ADMIN — METOPROLOL TARTRATE 12.5 MG: 25 TABLET, FILM COATED ORAL at 21:21

## 2022-01-28 RX ADMIN — ATORVASTATIN CALCIUM 40 MG: 40 TABLET, FILM COATED ORAL at 21:21

## 2022-01-28 RX ADMIN — METOPROLOL TARTRATE 12.5 MG: 25 TABLET, FILM COATED ORAL at 09:52

## 2022-01-28 RX ADMIN — HEPARIN SODIUM 5000 UNITS: 5000 INJECTION INTRAVENOUS; SUBCUTANEOUS at 13:42

## 2022-01-28 RX ADMIN — HEPARIN SODIUM 5000 UNITS: 5000 INJECTION INTRAVENOUS; SUBCUTANEOUS at 06:11

## 2022-01-28 RX ADMIN — SODIUM CHLORIDE, PRESERVATIVE FREE 10 ML: 5 INJECTION INTRAVENOUS at 06:11

## 2022-01-28 RX ADMIN — TEMAZEPAM 15 MG: 15 CAPSULE ORAL at 21:21

## 2022-01-28 RX ADMIN — SODIUM CHLORIDE, PRESERVATIVE FREE 5 ML: 5 INJECTION INTRAVENOUS at 13:42

## 2022-01-28 NOTE — PROGRESS NOTES
Care Management Interventions  PCP Verified by CM: Yes  Physical Therapy Consult: Yes  Occupational Therapy Consult: Yes  Speech Therapy Consult: No  Support Systems: Spouse/Significant Other,Child(sky)  Confirm Follow Up Transport: Family  Freedom of Choice List was Provided with Basic Dialogue that Supports the Patient's Individualized Plan of Care/Goals, Treatment Preferences and Shares the Quality Data Associated with the Providers?: Yes  [place] Resource Information Provided?: No  Discharge Location  Patient Expects to be Discharged to[de-identified] Unable to determine at this time   CM called patient's son to discuss discharge plans. Patient lives with her spouse, son, and daughter in law. Independent with ambulation and ADLs. Confirmed patient has a pcp. DME: none  No history of HH or rehab. CM following for discharge needs.

## 2022-01-28 NOTE — PROGRESS NOTES
Problem: Falls - Risk of  Goal: *Absence of Falls  Description: Document Kevin Garcia Fall Risk and appropriate interventions in the flowsheet.   Outcome: Progressing Towards Goal  Note: Fall Risk Interventions:  Mobility Interventions: Patient to call before getting OOB         Medication Interventions: Evaluate medications/consider consulting pharmacy    Elimination Interventions: Call light in reach              Problem: Patient Education: Go to Patient Education Activity  Goal: Patient/Family Education  Outcome: Progressing Towards Goal

## 2022-01-28 NOTE — PROGRESS NOTES
ACUTE PHYSICAL THERAPY GOALS:  (Developed with and agreed upon by patient and/or caregiver. )    LTG:  (1.)Ms. Fitzgerald will move from supine to sit and sit to supine , scoot up and down and roll side to side in flat bed without siderails with  INDEPENDENT within 7 day(s). (2.)Ms. Fitzgerald will perform all functional transfers with  MODIFIED INDEPENDENCE using the least restrictive/no device within 7 day(s). (3.)Ms. Fitzgerald will ambulate with  CONTACT GUARD ASSIST for 100+ feet with normal vital sign response with the least restrictive/no device within 7 day(s). PHYSICAL THERAPY: Daily Note and PM Treatment Day # 2    Rudy Styles is a 80 y.o. female   PRIMARY DIAGNOSIS: Acute hypoxemic respiratory failure due to COVID-19 [facility])  Acute hypoxemic respiratory failure [facility] [U07.1, J96.01]         ASSESSMENT:     REHAB RECOMMENDATIONS: CURRENT LEVEL OF FUNCTION:  (Most Recently Demonstrated)   Recommendation to date pending progress:  [address] [place] [address] Therapy  Equipment:    To Be Determined Bed Mobility:   Contact Guard Assistance  Sit to Stand:  [employer] Assistance  Transfers:   Not tested  Gait/Mobility:   Not tested     ASSESSMENT:  Ms. Jillian Rodriguez was supine and agreeable to work with therapy. She sat up and began LE ex. Stood with HHA and performed standing balane and marching in place. Returned sitting and finished ex then stood again and marched in place again but only about 1/2 as long. SUBJECTIVE:   Ms. Jillian Rodriguez states \"You did good too! \"    SOCIAL HISTORY/ LIVING ENVIRONMENT: Ms. Jillian Rodriguez lives with her , son, and DIL. She ambulates independently and is primarily homebound.   Support Systems: Spouse/Significant Other,Child(sky)  OBJECTIVE:     PAIN: VITAL SIGNS: LINES/DRAINS:   Pre Treatment:    Post Treatment:    Continuous Pulse Oximetry and Purewick  O2 Device: Heated,Hi flow nasal cannula     MOBILITY: I Mod I S SBA CGA Min Mod Max Total  NT x2 Comments:   Bed Mobility    Rolling [] [] [] [] [] [] [] [] [] [x] []    Supine to Sit [] [] [] [] [] [x] [] [] [] [] []    Scooting [] [] [] [] [] [x] [] [] [] [] []    Sit to Supine [] [] [] [] [] [x] [] [] [] [] []    Transfers    Sit to Stand [] [] [] [] [] [x] [] [] [] [] []    Bed to Chair [] [] [] [] [] [] [] [] [] [] []    Stand to Sit [] [] [] [] [] [x] [] [] [] [] []    I=Independent, Mod I=Modified Independent, S=Supervision, SBA=Standby Assistance, CGA=Contact Guard Assistance,   Min=Minimal Assistance, Mod=Moderate Assistance, Max=Maximal Assistance, Total=Total Assistance, NT=Not Tested    BALANCE: Good Fair+ Fair Fair- Poor NT Comments   Sitting Static [] [x] [] [] [] []    Sitting Dynamic [] [x] [] [] [] []              Standing Static [] [x] [] [] [] []    Standing Dynamic [] [] [x] [] [] []      GAIT: I Mod I S SBA CGA Min Mod Max Total  NT x2 Comments:   Level of Assistance [] [] [] [] [] [] [] [] [] [x] []    Distance     DME N/A    Gait Quality     Weightbearing  Status N/A     I=Independent, Mod I=Modified Independent, S=Supervision, SBA=Standby Assistance, CGA=Contact Guard Assistance,   Min=Minimal Assistance, Mod=Moderate Assistance, Max=Maximal Assistance, Total=Total Assistance, NT=Not Tested    PLAN:   FREQUENCY/DURATION: PT Plan of Care: 3 times/week for duration of hospital stay or until stated goals are met, whichever comes first.  TREATMENT:     TREATMENT:   ($$ Therapeutic Activity: 23-37 mins    )  Therapeutic Activity (23 Minutes): Therapeutic activity included Supine to Sit, Sit to Supine, Scooting, Ambulation on level ground, Sitting balance , Standing balance and LE ex to improve functional Mobility, Strength and Activity tolerance.     TREATMENT GRID:  N/A    AFTER TREATMENT POSITION/PRECAUTIONS:  Bed, Needs within reach and RN notified    INTERDISCIPLINARY COLLABORATION:  RN/PCT    TOTAL TREATMENT DURATION:  PT Patient Time In/Time Out  Time In: 2518  Time Out: CYN [place]

## 2022-01-28 NOTE — PROGRESS NOTES
Pt sitting up in bed, airvo in place at 55 L 100, sat 95% at this time. Call light in reach, will monitor.

## 2022-01-28 NOTE — PROGRESS NOTES
Patient in bed resting. Respirations even and unlabored. On airvo 55L/90%. All needs addressed. Safety measures in place. Call light within reach. No signs of acute distress at this time. Will continue to monitor. Preparing to give report to oncoming RN.

## 2022-01-28 NOTE — PROGRESS NOTES
Pt sittting up in bed. Pt alert oriented times 3 at this time. Pt complaints of not sleeping last night. Pt on Airvo at 55L 90% with sat 95% at this time. Pt has no acute distress noted at this time.  Pt encouraged to call for assistance if needed call light in reach, will monitor

## 2022-01-28 NOTE — PROGRESS NOTES
am  1/28/2022 6:32 AM    Admit Date: 1/26/2022    Admit Diagnosis: Acute hypoxemic respiratory failure due to COVID-19 ([facility] [U07.1, J96.01]      Subjective:    Patient :   81 yo female has a PMH of HTN, mixed HLD, GERD, glaucoma, Hay fever, HLD, and insomina presented to the ED on 1/26/2022 for increased confusion, SOB, and hypoxia. Patient is not vaccinated and her O2 saturation was in the 60s upon arrival via EMS. Placed on 6 L and given terbutaline. Patient denies history of lung issues. ECHO yesterday demonstrated EF of 60-65%. ECG showed sinus tachycardia, borderline LAD, low voltage precordial leads, abnormal R-wave progression. Borderline LAD. CXR showed bilateral interstitial and alveolar pulmonary opacities. CT chest showed extensive bilateral infiltrates typical of COVID pneumonia and was negative for pulmonary embolism. Patient was admitted to IM service for acute hypoxemic respiratory failure secondary to COVID 19 pneumonia and sepsis. Cardiology consulted for elevated troponin with abnormal EKG. Objective:      Visit Vitals  /82 (BP 1 Location: Left upper arm, BP Patient Position: At rest)   Pulse 78   Temp 98.2 °F (36.8 °C)   Resp 16   Ht 5' 1\" (1.549 m)   Wt 147 lb (66.7 kg)   SpO2 93%   BMI 27.78 kg/m²       ROS:  General ROS: negative for - chills  Hematological and Lymphatic ROS: negative for - blood clots or jaundice  Respiratory ROS: no cough, shortness of breath, or wheezing  Cardiovascular ROS:   Gastrointestinal ROS: no abdominal pain, change in bowel habits, or black or bloody stools  Neurological ROS: no TIA or stroke symptoms    Physical Exam:      Physical Examination: General appearance - .    Neck/lymph - supple, no significant adenopathy  Chest/CV - clear to auscultation, no wheezes, rales or rhonchi, symmetric air entry, not examined  Heart - normal rate, regular rhythm, normal S1, S2, no murmurs, rubs, clicks or gallops  Abdomen/GI - soft, nontender, nondistended, no masses or organomegaly   Musculoskeletal - no joint tenderness, deformity or swelling  Extremities - peripheral pulses normal, no pedal edema, no clubbing or cyanosis, not examined  Skin - normal coloration and turgor, no rashes, no suspicious skin lesions noted    Current Facility-Administered Medications   Medication Dose Route Frequency    dexlansoprazole (DEXILANT) capsule (delayed release) CpDB 60 mg (Patient Supplied)  60 mg Oral DAILY    sodium chloride (NS) flush 5-10 mL  5-10 mL IntraVENous Q8H    sodium chloride (NS) flush 5-10 mL  5-10 mL IntraVENous PRN    sodium chloride (NS) flush 5-40 mL  5-40 mL IntraVENous Q8H    sodium chloride (NS) flush 5-40 mL  5-40 mL IntraVENous PRN    acetaminophen (TYLENOL) tablet 650 mg  650 mg Oral Q6H PRN    Or    acetaminophen (TYLENOL) suppository 650 mg  650 mg Rectal Q6H PRN    polyethylene glycol (MIRALAX) packet 17 g  17 g Oral DAILY PRN    ondansetron (ZOFRAN ODT) tablet 4 mg  4 mg Oral Q8H PRN    Or    ondansetron (ZOFRAN) injection 4 mg  4 mg IntraVENous Q6H PRN    heparin (porcine) injection 5,000 Units  5,000 Units SubCUTAneous Q8H    guaiFENesin-dextromethorphan (ROBITUSSIN DM) 100-10 mg/5 mL syrup 5 mL  5 mL Oral Q4H PRN    dexamethasone (DECADRON) 10 mg/mL injection 6 mg  6 mg IntraVENous Q24H    cholecalciferol (VITAMIN D3) (1000 Units /25 mcg) tablet 2,000 Units  2,000 Units Oral DAILY    alum-mag hydroxide-simeth (MYLANTA) oral suspension 15 mL  15 mL Oral Q6H PRN    albuterol (PROVENTIL HFA, VENTOLIN HFA, PROAIR HFA) inhaler 2 Puff  2 Puff Inhalation Q4H PRN    atorvastatin (LIPITOR) tablet 40 mg  40 mg Oral QHS    gabapentin (NEURONTIN) capsule 300 mg  300 mg Oral TID PRN    temazepam (RESTORIL) capsule 15 mg  15 mg Oral QHS PRN       Data Review: data included in this note has been independently reviewed by the author   Lab results reviewed. For significant abnormal values and values requiring intervention, see assessment and plan. TELEMETRY:    Assessment/Plan:     Principal Problem:    Acute hypoxemic respiratory failure due to COVID-19 [facility]) (1/26/2022)    Suspected Cytokine Storm with elevated markers   - COVID + 1/16   - Unvaccinated   - Decadron 6 mg x 10 doses   - Consult pharm for Actemra  (CRP 17.3)  - Charted O2 Flow Rate (L/min): 55 l/min.   Wean as able  - Awake proning as tolerated, anti-tussive PRN, Bronchodilators     Active Problems:    Elevated troponin   - likely demand ischemia in setting of acute respiratory failure/COVID 19 infection/pneumonia  - troponin trending down ([phone])  - patient completely chest pain free   - when able recommend starting BB instead of resuming home procardia  - ECHO        Essential (primary) hypertension (10/28/2016)  - stable, on low side   - PTA medications held on admission  - monitor and resume as needed        Gastroesophageal reflux disease without esophagitis (9/28/2017)  - on PPI  - per primary        Hyperlipidemia (10/28/2016)   - continue atorvastatin        Insomnia, persistent (4/27/2016)  - per primary        Primary osteoarthritis involving multiple joints (7/31/2015)  - per primary        Sepsis due to COVID-19 [facility]) (1/26/2022)  - per primary         Hypomagnesemia (1/26/2022)  - replace and monitor with labs        Hypokalemia (1/26/2022)  - replace and monitor with labs        Toxic metabolic encephalopathy (1/66/0444)   - per primary        Chronic prescription benzodiazepine use (1/26/2022)  - per primary        Hypercoagulable state associated with COVID-19 ([facility] (1/26/2022)  - per primary        Transaminitis (1/26/2022)   - per primary               Tierney Chum

## 2022-01-28 NOTE — PROGRESS NOTES
Report received from [facility], [address] [place] [address]. Patient in bed resting. Respirations even and unlabored. On airvo 55L/85%. No needs expressed. Safety measures in place. Call light in reach. No signs of acute distress at this time. Will continue to monitor.

## 2022-01-28 NOTE — PROGRESS NOTES
Hospitalist Progress Note   Admit Date:  2022 12:32 PM   Name:  Geeta Conway   Age:  80 y.o. Sex:  female  :  1935   MRN:  [id number]   Room:  [facility]    Presenting Complaint: Positive For Covid-19    Reason(s) for Admission: Acute hypoxemic respiratory failure due to COVID-19 [facility]) [U07.1, J96.01]     Hospital Course & Interval History:   Geeta Conway is a 80 y.o. female with medical history of HTN, mixed HLD, GERD, Glaucoma, Hay fever,  HLD, insomnia who presented from home via EMS with hypoxia and AMS with recent diagnosis of COVID-19. EMS reports patient was hypoxic on arrival, O2 saturation in the 60s.  Placed on 6 L and given terbutaline.  EMS also noted family some concern for patient being more altered than normal.     In ED, T102 BP 91/56    spo2 74% RA, 85% 6L NC, 91% 55L/m  LA 3.9 CRP 17.3 D dimer 3.85 NT pro BNP 3481 HS trop 5550>1697   CXR BL infiltrates   rec'd Tylenol 1000 mg and decadron 10 mg IV      EKG shows sinus tachycardia, rate 115, , QRS 92, QTc 491     She is alert and oriented to person, place  but not month or year. Unable to get in touch with either son or . She notes symptom onset 5 days ago. Patient denies CP or palpitations, nausea, vomiting, diarrhea, fevers. +chills, +cough. Says  is sick. Discussed Actemra with patient including risks and benefits.      satting 94% on 55/90% airvo. Subjective (22): Patient seen and examined and continues to complain of shortness of breath and  denies fever chills. Complains of body aches and back pain. Assessment & Plan:     Acute Hypoxemic Respiratory Failure 2/2 COVID-19 PNA   Suspected Cytokine Storm with elevated markers   - COVID +  ,  Unvaccinated   - Decadron 6 mg x 10 doses   -Started on Bacitricininb  - Charted O2 Flow Rate (L/min): 55 l/min. Wean as able  -    Sepsis 2/2 COVID - admin 30 cc/kg bolus.    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 Pro calcitonin elevated  Ct chest showed covid and no PE          Elevated Troponin  Cardiac enzymes downtrending. EKG does not demonstrate ST segment elevation. Started on low dose beta blocker  Cardiology consulted Hypercoagulable state associated with COVID-19 -      Hypokalemia // Hypomagnesemia - associated with prolonged QTc   Resolved    Acute Encephalopathy -  Resolved       Chronic insomnia - takes temazepam 30 mg nightly for this. At risk of withdrawal. Reduce dose to 15 mg as needed.      Lumbar spondylosis - f/b pain management. Takes gabapentin 800 mg BID, tramadol 100 mg BID. Reduce gabapentin 300 mg TID prn, hold tramadol for now.        HLD - start atorvastatin      H/o HTN - BP low.  Monitor      GERD - resume protonix.      There is a high probability of acute organ impairment or life-threatening deterioration in the patient's condition from: Sepsis, acute respiratory failure on HFNC, VTE, severe electrolyte abnormalities     Diet:  ADULT DIET Easy to Chew; Low Fat/Low Chol/High Fiber/EVA  DVT PPx:  Code status: Full Code    Hospital Problems as of 1/28/2022 Date Reviewed: 4/12/2018          Codes Class Noted - Resolved POA    * (Principal) [facility] [place] [address]) ICD-10-CM: U07.1, J96.01  ICD-9-CM: 518.81, 079.89, 799.02  1/26/2022 - Present Yes        [facility] [place] [address]) ICD-10-CM: U07.1, A41.89  ICD-9-CM: 079.89, 995.91  1/26/2022 - Present Yes        Hypomagnesemia ICD-10-CM: E83.42  ICD-9-CM: 275.2  1/26/2022 - Present Yes        Hypokalemia ICD-10-CM: E87.6  ICD-9-CM: 276.8  1/26/2022 - Present Yes        Prolonged Q-T interval on ECG ICD-10-CM: R94.31  ICD-9-CM: 794.31  1/26/2022 - Present Yes        Elevated troponin ICD-10-CM: R77.8  ICD-9-CM: 790.6  1/26/2022 - Present Yes        Toxic metabolic encephalopathy UDW-81-MS: G92.8  ICD-9-CM: 349.82  1/26/2022 - Present Yes        Chronic prescription benzodiazepine use ICD-10-CM: Z79.899  ICD-9-CM: V58.69  1/26/2022 - Present Yes        Hypercoagulable state associated with COVID-19 [facility]) ICD-10-CM: U07.1, D68.69  ICD-9-CM: 289.82, 079.89  1/26/2022 - Present Yes        Transaminitis ICD-10-CM: R74.01  ICD-9-CM: 790.4  1/26/2022 - Present Yes        Gastroesophageal reflux disease without esophagitis ICD-10-CM: K21.9  ICD-9-CM: 530.81  9/28/2017 - Present Yes    Overview Signed 1/9/2018 10:12 AM by Varun Clemente DO     Last Assessment & Plan:   Stable. Essential (primary) hypertension ICD-10-CM: I10  ICD-9-CM: 401.9  10/28/2016 - Present Yes    Overview Signed 1/9/2018 10:12 AM by Varun Clemente DO     Last Assessment & Plan:   Stable. Hyperlipidemia ICD-10-CM: E78.5  ICD-9-CM: 272.4  10/28/2016 - Present Yes    Overview Signed 1/9/2018 10:12 AM by Varun Clemente DO     Last Assessment & Plan:   Return for fasting labs. Insomnia, persistent ICD-10-CM: G47.00  ICD-9-CM: 307.42  4/27/2016 - Present Yes    Overview Signed 1/9/2018 10:12 AM by Varun Clemente DO     Last Assessment & Plan:   Restoril prescription printed & given. Primary osteoarthritis involving multiple joints ICD-10-CM: M89.49  ICD-9-CM: 715.98  7/31/2015 - Present Yes    Overview Signed 1/9/2018 10:12 AM by Varun Clemente DO     Last Assessment & Plan:   3 Norco scripts printed with do not fill before dates of 10/10, 11/9, 12/9. Tramadol prescription printed & given (decreased from 150 to 120 tablets per month based on how patient is taking it). Aware of risk of sedation and aware to get prescriptions only in our office. Discussed polypharmacy & fall risk with patient's multiple sedating medications, encouraged to limit use as much as possible. Will have her see Dr. Leafy Los for next follow up to review pain medication regimen. Pt verbalized understanding, agreed with plan.                    Objective:     Patient Vitals for the past 24 hrs:   Temp Pulse Resp BP SpO2   01/28/22 1603 97.9 °F (36.6 °C) 63 20 132/89 --   01/28/22 1200 -- 62 -- -- --   01/28/22 1127 97.6 °F (36.4 °C) 62 20 132/79 95 %   01/28/22 0925 -- -- -- -- 94 %   01/28/22 0800 -- 82 -- -- --   01/28/22 0703 98.1 °F (36.7 °C) 82 20 (!) 142/84 95 %   01/28/22 0400 -- 78 -- -- --   01/28/22 0311 98.2 °F (36.8 °C) 81 16 133/82 93 %   01/28/22 0000 -- 86 -- -- --   01/27/22 2218 98 °F (36.7 °C) 80 16 125/73 98 %   01/27/22 2000 -- 90 -- -- --   01/1935 98 °F (36.7 °C) 92 20 97/79 98 %   01/27/22 1744 97.3 °F (36.3 °C) 92 24 135/80 94 %   01/27/22 1715 -- -- -- -- 98 %     Oxygen Therapy  O2 Sat (%): 95 % (01/28/22 1127)  Pulse via Oximetry: 88 beats per minute (01/28/22 0925)  O2 Device: Heated; Hi flow nasal cannula (01/28/22 0925)  Skin Assessment: Clean, dry, & intact (01/27/22 1715)  O2 Flow Rate (L/min): 55 l/min (01/28/22 0925)  O2 Temperature: 87.8 °F (31 °C) (01/28/22 0925)  FIO2 (%): 100 % (01/28/22 0925)    Estimated body mass index is 27.78 kg/m² as calculated from the following:    Height as of this encounter: 5' 1\" (1.549 m). Weight as of this encounter: 66.7 kg (147 lb). Intake/Output Summary (Last 24 hours) at 1/28/2022 1618  Last data filed at 1/28/2022 1349  Gross per 24 hour   Intake 440 ml   Output --   Net 440 ml         Physical Exam:     Blood pressure 132/89, pulse 63, temperature 97.9 °F (36.6 °C), resp. rate 20, height 5' 1\" (1.549 m), weight 66.7 kg (147 lb), SpO2 95 %. General:    Well nourished. No overt distress  Head:  Normocephalic, atraumatic  Eyes:  Sclerae appear normal.  Pupils equally round. ENT:  Nares appear normal, no drainage. Moist oral mucosa  Neck:  No restricted ROM. Trachea midline   CV:   RRR. No m/r/g. No jugular venous distension. Lungs:   Wheezing bilaterally  Abdomen: Bowel sounds present. Soft, nontender, nondistended. Extremities: No cyanosis or clubbing.   No edema  Skin:     No rashes and normal coloration. Warm and dry. Neuro:  CN II-XII grossly intact. Sensation intact. A&Ox3  Psych:  Normal mood and affect. I have reviewed ordered lab tests and independently visualized imaging below:    Recent Labs:  Recent Results (from the past 48 hour(s))   TROPONIN-HIGH SENSITIVITY    Collection Time: 01/26/22  6:14 PM   Result Value Ref Range    Troponin-High Sensitivity 1,334.5 (HH) 0 - 14 pg/mL   TROPONIN-HIGH SENSITIVITY    Collection Time: 01/26/22 10:00 PM   Result Value Ref Range    Troponin-High Sensitivity 1,004.9 (HH) 0 - 14 pg/mL   LACTIC ACID    Collection Time: 01/26/22 10:00 PM   Result Value Ref Range    Lactic acid 2.1 (H) 0.4 - 2.0 MMOL/L   VITAMIN D, 25 HYDROXY    Collection Time: 01/27/22  3:15 AM   Result Value Ref Range    Vitamin D 25-Hydroxy 35.5 30.0 - 100.0 ng/mL   PROCALCITONIN    Collection Time: 01/27/22  3:15 AM   Result Value Ref Range    Procalcitonin 4.84 (H) 0.00 - 8.36 ng/mL   METABOLIC PANEL, COMPREHENSIVE    Collection Time: 01/27/22  3:15 AM   Result Value Ref Range    Sodium 143 136 - 145 mmol/L    Potassium 3.2 (L) 3.5 - 5.1 mmol/L    Chloride 108 (H) 98 - 107 mmol/L    CO2 31 21 - 32 mmol/L    Anion gap 4 (L) 7 - 16 mmol/L    Glucose 154 (H) 65 - 100 mg/dL    BUN 27 (H) 8 - 23 MG/DL    Creatinine 0.62 0.6 - 1.0 MG/DL    GFR est AA >60 >60 ml/min/1.73m2    GFR est non-AA >60 >60 ml/min/1.73m2    Calcium 8.4 8.3 - 10.4 MG/DL    Bilirubin, total 0.3 0.2 - 1.1 MG/DL    ALT (SGPT) 15 12 - 65 U/L    AST (SGOT) 52 (H) 15 - 37 U/L    Alk.  phosphatase 62 50 - 136 U/L    Protein, total 5.9 (L) 6.3 - 8.2 g/dL    Albumin 2.0 (L) 3.2 - 4.6 g/dL    Globulin 3.9 (H) 2.3 - 3.5 g/dL    A-G Ratio 0.5 (L) 1.2 - 3.5     CBC WITH AUTOMATED DIFF    Collection Time: 01/27/22  3:15 AM   Result Value Ref Range    WBC 5.6 4.3 - 11.1 K/uL    RBC 3.74 (L) 4.05 - 5.2 M/uL    HGB 10.8 (L) 11.7 - 15.4 g/dL    HCT 33.6 (L) 35.8 - 46.3 %    MCV 89.8 79.6 - 97.8 FL    MCH 28.9 26.1 - 32.9 PG    MCHC 32.1 31.4 - 35.0 g/dL    RDW 14.3 11.9 - 14.6 %    PLATELET 018 113 - 409 K/uL    MPV 9.4 9.4 - 12.3 FL    ABSOLUTE NRBC 0.00 0.0 - 0.2 K/uL    DF AUTOMATED      NEUTROPHILS 76 43 - 78 %    LYMPHOCYTES 16 13 - 44 %    MONOCYTES 8 4.0 - 12.0 %    EOSINOPHILS 0 (L) 0.5 - 7.8 %    BASOPHILS 0 0.0 - 2.0 %    IMMATURE GRANULOCYTES 1 0.0 - 5.0 %    ABS. NEUTROPHILS 4.2 1.7 - 8.2 K/UL    ABS. LYMPHOCYTES 0.9 0.5 - 4.6 K/UL    ABS. MONOCYTES 0.4 0.1 - 1.3 K/UL    ABS. EOSINOPHILS 0.0 0.0 - 0.8 K/UL    ABS. BASOPHILS 0.0 0.0 - 0.2 K/UL    ABS. IMM.  GRANS. 0.1 0.0 - 0.5 K/UL   D DIMER    Collection Time: 01/27/22  3:15 AM   Result Value Ref Range    D DIMER 3.94 (H) <0.56 ug/ml(FEU)   C REACTIVE PROTEIN, QT    Collection Time: 01/27/22  3:15 AM   Result Value Ref Range    C-Reactive protein 15.0 (H) 0.0 - 0.9 mg/dL   MAGNESIUM    Collection Time: 01/27/22  3:15 AM   Result Value Ref Range    Magnesium 2.5 (H) 1.8 - 2.4 mg/dL   TROPONIN-HIGH SENSITIVITY    Collection Time: 01/27/22  3:15 AM   Result Value Ref Range    Troponin-High Sensitivity 663.0 (HH) 0 - 14 pg/mL   ECHO ADULT COMPLETE    Collection Time: 01/27/22  2:49 PM   Result Value Ref Range    LV EDV A2C 66 mL    LV EDV A4C 63 mL    LV EDV BP 66 56 - 104 mL    LV ESV A2C 31 mL    LV ESV A4C 25 mL    IVSd 0.9 0.6 - 0.9 cm    LVIDd 4.9 3.9 - 5.3 cm    LVIDs 3.2 cm    LVOT Diameter 1.9 cm    LVOT Mean Gradient 2 mmHg    LVOT VTI 21.3 cm    LVOT Peak Velocity 1.0 m/s    LVOT Peak Gradient 4 mmHg    LVPWd 0.7 0.6 - 0.9 cm    LV E' Lateral Velocity 11 cm/s    LV E' Septal Velocity 6 cm/s    LV Ejection Fraction A2C 54 %    LV Ejection Fraction A4C 60 %    EF BP 58 55 - 100 %    LVOT Area 2.8 cm2    LVOT SV 60.4 ml    LA Minor Axis 4.7 cm    LA Major Axis 5.0 cm    LA Area 2C 13.7 cm2    LA Area 4C 14.2 cm2    LA Volume BP 34 22 - 52 mL    LA Diameter 2.4 cm    AV Mean Velocity 0.9 m/s    AV Mean Gradient 4 mmHg    AV VTI 27.5 cm    AV Peak Velocity 1.5 m/s AV Peak Gradient 9 mmHg    AV Area by VTI 2.2 cm2    AV Area by Peak Velocity 2.0 cm2    Aortic Root 3.3 cm    Ascending Aorta 3.0 cm    IVC Proxmal 2.0 cm    MV E Wave Deceleration Time 219.0 ms    MV A Velocity 1.25 m/s    MV E Velocity 0.85 m/s    PV .0 ms    PV Max Velocity 0.9 m/s    PV Peak Gradient 3 mmHg    RVIDd 2.7 cm    RV Basal Dimension 3.3 cm    TAPSE 2.2 (A) 1.5 - 2.0 cm    TR Max Velocity 2.43 m/s    TR Peak Gradient 24 mmHg    Fractional Shortening 2D 35 28 - 44 %    LV EDV Index BP 40 mL/m2    LV ESV Index A4C 15 mL/m2    LV EDV Index A4C 38 mL/m2    LV ESV Index A2C 19 mL/m2    LV EDV Index A2C 40 mL/m2    LVIDd Index 2.95 cm/m2    LVIDs Index 1.93 cm/m2    LV RWT Ratio 0.29     LV Mass 2D 131.2 67 - 162 g    LV Mass 2D Index 79.0 43 - 95 g/m2    MV E/A 0.68     E/E' Ratio (Averaged) 10.95     E/E' Lateral 7.73     E/E' Septal 14.17     LA Volume Index BP 20 16 - 34 ml/m2    LVOT Stroke Volume Index 36.4 mL/m2    LA Size Index 1.45 cm/m2    LA/AO Root Ratio 0.73     Ao Root Index 1.99 cm/m2    Ascending Aorta Index 1.81 cm/m2    AV Velocity Ratio 0.67     LVOT:AV VTI Index 0.77     ASHA/BSA VTI 1.3 cm2/m2    ASHA/BSA Peak Velocity 1.2 cm2/m2    Est. RA Pressure 3 mmHg    RVSP 27 mmHg   LACTIC ACID    Collection Time: 01/27/22  4:32 PM   Result Value Ref Range    Lactic acid 1.2 0.4 - 2.0 MMOL/L   D DIMER    Collection Time: 01/28/22  4:05 AM   Result Value Ref Range    D DIMER 3.40 (H) <0.56 ug/ml(FEU)   C REACTIVE PROTEIN, QT    Collection Time: 01/28/22  4:05 AM   Result Value Ref Range    C-Reactive protein 7.2 (H) 0.0 - 0.9 mg/dL   MAGNESIUM    Collection Time: 01/28/22  4:05 AM   Result Value Ref Range    Magnesium 2.2 1.8 - 2.4 mg/dL   METABOLIC PANEL, BASIC    Collection Time: 01/28/22  4:05 AM   Result Value Ref Range    Sodium 144 136 - 145 mmol/L    Potassium 4.4 3.5 - 5.1 mmol/L    Chloride 110 (H) 98 - 107 mmol/L    CO2 28 21 - 32 mmol/L    Anion gap 6 (L) 7 - 16 mmol/L Glucose 113 (H) 65 - 100 mg/dL    BUN 36 (H) 8 - 23 MG/DL    Creatinine 0.65 0.6 - 1.0 MG/DL    GFR est AA >60 >60 ml/min/1.73m2    GFR est non-AA >60 >60 ml/min/1.73m2    Calcium 8.5 8.3 - 10.4 MG/DL   URINALYSIS W/ RFLX MICROSCOPIC    Collection Time: 01/28/22 11:05 AM   Result Value Ref Range    Color ILEANA      Appearance CLOUDY      Specific gravity 1.039 (H) 1.001 - 1.023      pH (UA) 6.0 5.0 - 9.0      Protein 100 (A) NEG mg/dL    Glucose Negative mg/dL    Ketone 40 (A) NEG mg/dL    Bilirubin SMALL (A) NEG      Blood Negative NEG      Urobilinogen 0.2 0.2 - 1.0 EU/dL    Nitrites Negative NEG      Leukocyte Esterase Negative NEG      WBC 3-5 0 /hpf    RBC 0-3 0 /hpf    Epithelial cells 0-3 0 /hpf    Bacteria 4+ (H) 0 /hpf    Other observations RESULTS VERIFIED MANUALLY         All Micro Results     Procedure Component Value Units Date/Time    CULTURE, URINE [[id number]] Collected: 01/28/22 1105    Order Status: Completed Specimen: Urine from Clean catch Updated: 01/28/22 1144    COVID-19 RAPID TEST [[id number]]  (Abnormal) Collected: 01/26/22 1258    Order Status: Completed Specimen: Nasopharyngeal Updated: 01/26/22 1316     Specimen source NASAL        COVID-19 rapid test Detected        Comment:      The specimen is POSITIVE for SARS-CoV-2, the novel coronavirus associated with COVID-19. This test has been authorized by the FDA under an Emergency Use Authorization (EUA) for use by authorized laboratories. Fact sheet for Healthcare Providers: ConventionUpdate.co.nz  Fact sheet for Patients: ConventionUpdate.co.nz       Methodology: Isothermal Nucleic Acid Amplification               Other Studies:  No results found.     Current Meds:  Current Facility-Administered Medications   Medication Dose Route Frequency    metoprolol tartrate (LOPRESSOR) tablet 12.5 mg  12.5 mg Oral Q12H    insulin lispro (HUMALOG) injection   SubCUTAneous ACB&D    dexlansoprazole (DEXILANT) capsule (delayed release) CpDB 60 mg (Patient Supplied)  60 mg Oral DAILY    sodium chloride (NS) flush 5-10 mL  5-10 mL IntraVENous Q8H    sodium chloride (NS) flush 5-10 mL  5-10 mL IntraVENous PRN    sodium chloride (NS) flush 5-40 mL  5-40 mL IntraVENous Q8H    sodium chloride (NS) flush 5-40 mL  5-40 mL IntraVENous PRN    acetaminophen (TYLENOL) tablet 650 mg  650 mg Oral Q6H PRN    Or    acetaminophen (TYLENOL) suppository 650 mg  650 mg Rectal Q6H PRN    polyethylene glycol (MIRALAX) packet 17 g  17 g Oral DAILY PRN    ondansetron (ZOFRAN ODT) tablet 4 mg  4 mg Oral Q8H PRN    Or    ondansetron (ZOFRAN) injection 4 mg  4 mg IntraVENous Q6H PRN    heparin (porcine) injection 5,000 Units  5,000 Units SubCUTAneous Q8H    guaiFENesin-dextromethorphan (ROBITUSSIN DM) 100-10 mg/5 mL syrup 5 mL  5 mL Oral Q4H PRN    dexamethasone (DECADRON) 10 mg/mL injection 6 mg  6 mg IntraVENous Q24H    cholecalciferol (VITAMIN D3) (1000 Units /25 mcg) tablet 2,000 Units  2,000 Units Oral DAILY    alum-mag hydroxide-simeth (MYLANTA) oral suspension 15 mL  15 mL Oral Q6H PRN    albuterol (PROVENTIL HFA, VENTOLIN HFA, PROAIR HFA) inhaler 2 Puff  2 Puff Inhalation Q4H PRN    atorvastatin (LIPITOR) tablet 40 mg  40 mg Oral QHS    gabapentin (NEURONTIN) capsule 300 mg  300 mg Oral TID PRN    temazepam (RESTORIL) capsule 15 mg  15 mg Oral QHS PRN       Signed:  Sasha Rios MD    Part of this note may have been written by using a voice dictation software. The note has been proof read but may still contain some grammatical/other typographical errors.

## 2022-01-29 LAB
ANION GAP SERPL CALC-SCNC: 5 MMOL/L (ref 7–16)
ATRIAL RATE: 174 BPM
BASOPHILS # BLD: 0 K/UL (ref 0–0.2)
BASOPHILS NFR BLD: 0 % (ref 0–2)
BUN SERPL-MCNC: 41 MG/DL (ref 8–23)
CALCIUM SERPL-MCNC: 8.7 MG/DL (ref 8.3–10.4)
CALCULATED R AXIS, ECG10: -23 DEGREES
CALCULATED T AXIS, ECG11: -82 DEGREES
CHLORIDE SERPL-SCNC: 109 MMOL/L (ref 98–107)
CK SERPL-CCNC: 42 U/L (ref 21–215)
CO2 SERPL-SCNC: 30 MMOL/L (ref 21–32)
CREAT SERPL-MCNC: 0.75 MG/DL (ref 0.6–1)
CRP SERPL-MCNC: 3.1 MG/DL (ref 0–0.9)
D DIMER PPP FEU-MCNC: 3.16 UG/ML(FEU)
DIAGNOSIS, 93000: NORMAL
DIFFERENTIAL METHOD BLD: ABNORMAL
EOSINOPHIL # BLD: 0 K/UL (ref 0–0.8)
EOSINOPHIL NFR BLD: 0 % (ref 0.5–7.8)
ERYTHROCYTE [DISTWIDTH] IN BLOOD BY AUTOMATED COUNT: 14.2 % (ref 11.9–14.6)
GLUCOSE BLD STRIP.AUTO-MCNC: 170 MG/DL (ref 65–100)
GLUCOSE BLD STRIP.AUTO-MCNC: 80 MG/DL (ref 65–100)
GLUCOSE SERPL-MCNC: 89 MG/DL (ref 65–100)
HCT VFR BLD AUTO: 35.9 % (ref 35.8–46.3)
HGB BLD-MCNC: 11.2 G/DL (ref 11.7–15.4)
IMM GRANULOCYTES # BLD AUTO: 0.1 K/UL (ref 0–0.5)
IMM GRANULOCYTES NFR BLD AUTO: 1 % (ref 0–5)
LYMPHOCYTES # BLD: 1.3 K/UL (ref 0.5–4.6)
LYMPHOCYTES NFR BLD: 13 % (ref 13–44)
MCH RBC QN AUTO: 28.3 PG (ref 26.1–32.9)
MCHC RBC AUTO-ENTMCNC: 31.2 G/DL (ref 31.4–35)
MCV RBC AUTO: 90.7 FL (ref 79.6–97.8)
MONOCYTES # BLD: 0.8 K/UL (ref 0.1–1.3)
MONOCYTES NFR BLD: 8 % (ref 4–12)
NEUTS SEG # BLD: 7.8 K/UL (ref 1.7–8.2)
NEUTS SEG NFR BLD: 78 % (ref 43–78)
NRBC # BLD: 0 K/UL (ref 0–0.2)
PLATELET # BLD AUTO: 401 K/UL (ref 150–450)
PMV BLD AUTO: 9.9 FL (ref 9.4–12.3)
POTASSIUM SERPL-SCNC: 4.1 MMOL/L (ref 3.5–5.1)
PROCALCITONIN SERPL-MCNC: 1.15 NG/ML (ref 0–0.49)
Q-T INTERVAL, ECG07: 358 MS
QRS DURATION, ECG06: 78 MS
QTC CALCULATION (BEZET), ECG08: 578 MS
RBC # BLD AUTO: 3.96 M/UL (ref 4.05–5.2)
SERVICE CMNT-IMP: ABNORMAL
SERVICE CMNT-IMP: NORMAL
SODIUM SERPL-SCNC: 144 MMOL/L (ref 136–145)
TROPONIN-HIGH SENSITIVITY: 124.6 PG/ML (ref 0–14)
VENTRICULAR RATE, ECG03: 157 BPM
WBC # BLD AUTO: 9.9 K/UL (ref 4.3–11.1)

## 2022-01-29 PROCEDURE — 74011250636 HC RX REV CODE- 250/636: Performed by: STUDENT IN AN ORGANIZED HEALTH CARE EDUCATION/TRAINING PROGRAM

## 2022-01-29 PROCEDURE — 74011000250 HC RX REV CODE- 250: Performed by: FAMILY MEDICINE

## 2022-01-29 PROCEDURE — 85379 FIBRIN DEGRADATION QUANT: CPT

## 2022-01-29 PROCEDURE — 82550 ASSAY OF CK (CPK): CPT

## 2022-01-29 PROCEDURE — 94762 N-INVAS EAR/PLS OXIMTRY CONT: CPT

## 2022-01-29 PROCEDURE — 74011250637 HC RX REV CODE- 250/637: Performed by: NURSE PRACTITIONER

## 2022-01-29 PROCEDURE — 86140 C-REACTIVE PROTEIN: CPT

## 2022-01-29 PROCEDURE — 74011636637 HC RX REV CODE- 636/637: Performed by: STUDENT IN AN ORGANIZED HEALTH CARE EDUCATION/TRAINING PROGRAM

## 2022-01-29 PROCEDURE — 85025 COMPLETE CBC W/AUTO DIFF WBC: CPT

## 2022-01-29 PROCEDURE — 74011250636 HC RX REV CODE- 250/636: Performed by: FAMILY MEDICINE

## 2022-01-29 PROCEDURE — 65660000000 HC RM CCU STEPDOWN

## 2022-01-29 PROCEDURE — 82962 GLUCOSE BLOOD TEST: CPT

## 2022-01-29 PROCEDURE — 74011250637 HC RX REV CODE- 250/637: Performed by: STUDENT IN AN ORGANIZED HEALTH CARE EDUCATION/TRAINING PROGRAM

## 2022-01-29 PROCEDURE — 93005 ELECTROCARDIOGRAM TRACING: CPT | Performed by: STUDENT IN AN ORGANIZED HEALTH CARE EDUCATION/TRAINING PROGRAM

## 2022-01-29 PROCEDURE — 84484 ASSAY OF TROPONIN QUANT: CPT

## 2022-01-29 PROCEDURE — 74011000250 HC RX REV CODE- 250: Performed by: STUDENT IN AN ORGANIZED HEALTH CARE EDUCATION/TRAINING PROGRAM

## 2022-01-29 PROCEDURE — 74011250637 HC RX REV CODE- 250/637: Performed by: FAMILY MEDICINE

## 2022-01-29 PROCEDURE — 36415 COLL VENOUS BLD VENIPUNCTURE: CPT

## 2022-01-29 PROCEDURE — 80048 BASIC METABOLIC PNL TOTAL CA: CPT

## 2022-01-29 PROCEDURE — 84145 PROCALCITONIN (PCT): CPT

## 2022-01-29 PROCEDURE — 99231 SBSQ HOSP IP/OBS SF/LOW 25: CPT | Performed by: INTERNAL MEDICINE

## 2022-01-29 PROCEDURE — 77010033711 HC HIGH FLOW OXYGEN

## 2022-01-29 RX ORDER — TRAMADOL HYDROCHLORIDE 50 MG/1
100 TABLET ORAL
Status: DISCONTINUED | OUTPATIENT
Start: 2022-01-29 | End: 2022-02-16 | Stop reason: HOSPADM

## 2022-01-29 RX ORDER — ENOXAPARIN SODIUM 100 MG/ML
70 INJECTION SUBCUTANEOUS EVERY 12 HOURS
Status: DISCONTINUED | OUTPATIENT
Start: 2022-01-29 | End: 2022-01-30

## 2022-01-29 RX ORDER — METOPROLOL TARTRATE 5 MG/5ML
5 INJECTION INTRAVENOUS ONCE
Status: COMPLETED | OUTPATIENT
Start: 2022-01-29 | End: 2022-01-29

## 2022-01-29 RX ORDER — METOPROLOL TARTRATE 25 MG/1
25 TABLET, FILM COATED ORAL 2 TIMES DAILY
Status: DISCONTINUED | OUTPATIENT
Start: 2022-01-29 | End: 2022-01-30

## 2022-01-29 RX ORDER — METOPROLOL TARTRATE 50 MG/1
50 TABLET ORAL EVERY 12 HOURS
Status: DISCONTINUED | OUTPATIENT
Start: 2022-01-29 | End: 2022-02-09

## 2022-01-29 RX ORDER — METOPROLOL TARTRATE 50 MG/1
50 TABLET ORAL EVERY 12 HOURS
Status: DISCONTINUED | OUTPATIENT
Start: 2022-01-29 | End: 2022-01-29

## 2022-01-29 RX ADMIN — DEXLANSOPRAZOLE 60 MG: 60 CAPSULE, DELAYED RELEASE ORAL at 09:00

## 2022-01-29 RX ADMIN — SODIUM CHLORIDE, PRESERVATIVE FREE 10 ML: 5 INJECTION INTRAVENOUS at 06:00

## 2022-01-29 RX ADMIN — METOPROLOL TARTRATE 5 MG: 5 INJECTION INTRAVENOUS at 13:38

## 2022-01-29 RX ADMIN — ENOXAPARIN SODIUM 70 MG: 100 INJECTION SUBCUTANEOUS at 13:35

## 2022-01-29 RX ADMIN — SODIUM CHLORIDE, PRESERVATIVE FREE 10 ML: 5 INJECTION INTRAVENOUS at 21:13

## 2022-01-29 RX ADMIN — METOPROLOL TARTRATE 12.5 MG: 25 TABLET, FILM COATED ORAL at 09:40

## 2022-01-29 RX ADMIN — HEPARIN SODIUM 5000 UNITS: 5000 INJECTION INTRAVENOUS; SUBCUTANEOUS at 06:00

## 2022-01-29 RX ADMIN — VITAMIN D, TAB 1000IU (100/BT) 2000 UNITS: 25 TAB at 09:41

## 2022-01-29 RX ADMIN — ATORVASTATIN CALCIUM 40 MG: 40 TABLET, FILM COATED ORAL at 21:13

## 2022-01-29 RX ADMIN — TEMAZEPAM 15 MG: 15 CAPSULE ORAL at 21:12

## 2022-01-29 RX ADMIN — METOPROLOL TARTRATE 50 MG: 50 TABLET, FILM COATED ORAL at 21:12

## 2022-01-29 RX ADMIN — SODIUM CHLORIDE, PRESERVATIVE FREE 10 ML: 5 INJECTION INTRAVENOUS at 13:49

## 2022-01-29 RX ADMIN — TRAMADOL HYDROCHLORIDE 100 MG: 50 TABLET, COATED ORAL at 14:22

## 2022-01-29 RX ADMIN — BARICITINIB 2 MG: 2 TABLET, FILM COATED ORAL at 09:40

## 2022-01-29 RX ADMIN — Medication 2 UNITS: at 17:14

## 2022-01-29 RX ADMIN — DEXAMETHASONE SODIUM PHOSPHATE 6 MG: 10 INJECTION, SOLUTION INTRAMUSCULAR; INTRAVENOUS at 09:39

## 2022-01-29 NOTE — PROGRESS NOTES
Patient assessment completed at this time. Patient found in bed resting quietly on heated Hi flow 55L 95 % . Patient is alert and oriented at this time. Patient denies pain at this time. Patient denies needs at this time. Will monitor.

## 2022-01-29 NOTE — PROGRESS NOTES
[facility] CARDIOLOGY PROGRESS NOTE           1/29/2022 10:40 AM    Admit Date: 1/26/2022      Subjective:   No cp    ROS:  Cardiovascular:  As noted above    Objective:      Vitals:    01/29/22 0400 01/29/22 0417 01/29/22 0850 01/29/22 1008   BP:  (!) 148/70 (!) 140/67    Pulse: 66 64 68    Resp:  18 18    Temp:  97.9 °F (36.6 °C) 98 °F (36.7 °C)    SpO2:  96% 97% 94%   Weight:       Height:           Physical Exam:  General-No Acute Distress, able to converse w/ some limitation on High flow O2  Neck- supple, no JVD  CV- regular rate and rhythm no MRG  Lung- clear bilaterally  Abd- soft, nontender, nondistended  Ext- no edema bilaterally. Skin- warm and dry    Data Review:   Recent Labs     01/29/22  0352 01/28/22  0405 01/27/22  0315 01/27/22  0315    144   < > 143   K 4.1 4.4   < > 3.2*   MG  --  2.2  --  2.5*   BUN 41* 36*   < > 27*   CREA 0.75 0.65   < > 0.62   GLU 89 113*   < > 154*   WBC 9.9  --   --  5.6   HGB 11.2*  --   --  10.8*   HCT 35.9  --   --  33.6*     --   --  295    < > = values in this interval not displayed.      Echo ef nml, no wma    Assessment/Plan:     Principal Problem:    Acute hypoxemic respiratory failure due to COVID-19 ([address] (1/26/2022)        Active Problems:    Essential (primary) hypertension (10/28/2016)          Gastroesophageal reflux disease without esophagitis (9/28/2017)          Hyperlipidemia (10/28/2016)          Insomnia, persistent (4/27/2016)          Primary osteoarthritis involving multiple joints (7/31/2015)          Sepsis due to COVID-19 ([address] (1/26/2022)          Hypomagnesemia (1/26/2022)          Hypokalemia (1/26/2022)          Prolonged Q-T interval on ECG (1/26/2022)          Elevated troponin (1/26/2022)          Toxic metabolic encephalopathy (6/86/7778)          Chronic prescription benzodiazepine use (1/26/2022)          Hypercoagulable state associated with COVID-19 ([facility] (1/26/2022)          Transaminitis (1/26/2022)    ////    CV stable  No mi or chf  On standby              Laina Reddy MD  1/29/2022 10:40 AM

## 2022-01-29 NOTE — PROGRESS NOTES
Ultram 100 mg po given. 01/29/22 1422   Pain 1   Pain Scale 1 Numeric (0 - 10)   Pain Intensity 1 10   Patient Stated Pain Goal 0   Pain Onset 1 acute;chronic   Pain Location 1 Back   Pain Orientation 1 Mid;Lower   Pain Description 1 Aching; Sharp   Pain Intervention(s) 1 Medication (see MAR)

## 2022-01-29 NOTE — PROGRESS NOTES
Converted from NS to  A-fib. Dr. Hernandez Moreira notified.      01/29/22 1227   Cardiac   Cardiac Rhythm Atrial Fib  (HR 150s.)

## 2022-01-29 NOTE — PROGRESS NOTES
01/29/22 1409   Vital Signs   Cardiac Rhythm Sinus Rhythm  (Monitor room reports converted back to NSR.)   Converted back to SR after pushing IV lopressor. Cardiology NP, Liz Fisher aware, and ordered po Lopressor now, as well.

## 2022-01-29 NOTE — PROGRESS NOTES
Hospitalist Progress Note   Admit Date:  2022 12:32 PM   Name:  Dg Hahn   Age:  80 y.o. Sex:  female  :  1935   MRN:  [id number]   Room:  [facility]    Presenting Complaint: Positive For Covid-19    Reason(s) for Admission: Acute hypoxemic respiratory failure due to COVID-19 [facility]) [U07.1, J96.01]     Hospital Course & Interval History:   Dg Hahn is a 80 y.o. female with medical history of HTN, mixed HLD, GERD, Glaucoma, Hay fever,  HLD, insomnia who presented from home via EMS with hypoxia and AMS with recent diagnosis of COVID-19. EMS reports patient was hypoxic on arrival, O2 saturation in the 60s.  Placed on 6 L and given terbutaline.  EMS also noted family some concern for patient being more altered than normal.     In ED, T102 BP 91/56    spo2 74% RA, 85% 6L NC, 91% 55L/m  LA 3.9 CRP 17.3 D dimer 3.85 NT pro BNP 3481 HS trop 2112>4886   CXR BL infiltrates   rec'd Tylenol 1000 mg and decadron 10 mg IV      EKG shows sinus tachycardia, rate 115, , QRS 92, QTc 491     She is alert and oriented to person, place  but not month or year. Unable to get in touch with either son or . She notes symptom onset 5 days ago. Patient denies CP or palpitations, nausea, vomiting, diarrhea, fevers. +chills, +cough. Says  is sick. Discussed Actemra with patient including risks and benefits.      satting 94% on 55/90% airvo. Subjective (22): Patient seen and examined and continues to complain of shortness of breath and  denies fever chills. Complains of body aches and back pain. Assessment & Plan:     Acute Hypoxemic Respiratory Failure 2/2 COVID-19 PNA   Suspected Cytokine Storm with elevated markers   - COVID +  ,  Unvaccinated   - Decadron 6 mg x 10 doses   -Started on Bacitricininb  - Charted O2 Flow Rate (L/min): 55 l/min and NRB.         New onset Afib  EKG showed A. fib with RVR with HR 170s  Troponins were elevated and down trended  Lilian vasc score of 4 and stroke risk of 4.8% per yr  Started on Rate control medication with metoprolol  Anticoagulation with Full dose lovenox  Follow-up with echo  Cardiology consulted  -    Sepsis 2/2 COVID - admin 30 cc/kg bolus. 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 Pro calcitonin elevated 1.15  Ct chest showed covid and no PE       Troponemia  Cardiac enzymes downtrending. EKG does not demonstrate ST segment elevation. Started on low dose beta blocker  Cardiology consulted Hypercoagulable state associated with COVID-19 -      Hypokalemia // Hypomagnesemia - associated with prolonged QTc   Resolved    Acute Encephalopathy -  Resolved       Chronic insomnia - takes temazepam 30 mg nightly for this. At risk of withdrawal. Reduce dose to 15 mg as needed.      Lumbar spondylosis - f/b pain management. Takes gabapentin 800 mg BID, tramadol 100 mg BID. Reduce gabapentin 300 mg TID prn, hold tramadol for now.        HLD - start atorvastatin      H/o HTN - BP low.  Monitor      GERD - resume protonix.      There is a high probability of acute organ impairment or life-threatening deterioration in the patient's condition from: Sepsis, acute respiratory failure on HFNC, VTE, severe electrolyte abnormalities     Diet:  ADULT DIET Easy to Chew; Low Fat/Low Chol/High Fiber/EVA  DVT PPx:  Code status: Full Code    Hospital Problems as of 1/29/2022 Date Reviewed: 4/12/2018          Codes Class Noted - Resolved POA    * (Principal) Acute hypoxemic respiratory failure due to COVID-19 [facility]) ICD-10-CM: U07.1, J96.01  ICD-9-CM: 518.81, 079.89, 799.02  1/26/2022 - Present Yes        Sepsis due to COVID-19 [facility]) ICD-10-CM: U07.1, A41.89  ICD-9-CM: 079.89, 995.91  1/26/2022 - Present Yes        Hypomagnesemia ICD-10-CM: F86.58  ICD-9-CM: 275.2  1/26/2022 - Present Yes        Hypokalemia ICD-10-CM: E87.6  ICD-9-CM: 276.8  1/26/2022 - Present Yes        Prolonged Q-T interval on ECG ICD-10-CM: R94.31  ICD-9-CM: 794.31  1/26/2022 - Present Yes        Elevated troponin ICD-10-CM: R77.8  ICD-9-CM: 790.6  1/26/2022 - Present Yes        Toxic metabolic encephalopathy FOP-45-YF: G92.8  ICD-9-CM: 349.82  1/26/2022 - Present Yes        Chronic prescription benzodiazepine use ICD-10-CM: Z79.899  ICD-9-CM: V58.69  1/26/2022 - Present Yes        Hypercoagulable state associated with COVID-19 [facility]) ICD-10-CM: U07.1, D68.69  ICD-9-CM: 289.82, 079.89  1/26/2022 - Present Yes        Transaminitis ICD-10-CM: R74.01  ICD-9-CM: 790.4  1/26/2022 - Present Yes        Gastroesophageal reflux disease without esophagitis ICD-10-CM: K21.9  ICD-9-CM: 530.81  9/28/2017 - Present Yes    Overview Signed 1/9/2018 10:12 AM by Twila Cortes DO     Last Assessment & Plan:   Stable. Essential (primary) hypertension ICD-10-CM: I10  ICD-9-CM: 401.9  10/28/2016 - Present Yes    Overview Signed 1/9/2018 10:12 AM by Twila Cortes DO     Last Assessment & Plan:   Stable. Hyperlipidemia ICD-10-CM: E78.5  ICD-9-CM: 272.4  10/28/2016 - Present Yes    Overview Signed 1/9/2018 10:12 AM by Twila Cortes DO     Last Assessment & Plan:   Return for fasting labs. Insomnia, persistent ICD-10-CM: G47.00  ICD-9-CM: 307.42  4/27/2016 - Present Yes    Overview Signed 1/9/2018 10:12 AM by Twila Cortes DO     Last Assessment & Plan:   Restoril prescription printed & given. Primary osteoarthritis involving multiple joints ICD-10-CM: M89.49  ICD-9-CM: 715.98  7/31/2015 - Present Yes    Overview Signed 1/9/2018 10:12 AM by Twila Cortes DO     Last Assessment & Plan:   3 Norco scripts printed with do not fill before dates of 10/10, 11/9, 12/9. Tramadol prescription printed & given (decreased from 150 to 120 tablets per month based on how patient is taking it). Aware of risk of sedation and aware to get prescriptions only in our office.  Discussed polypharmacy & fall risk with patient's multiple sedating medications, encouraged to limit use as much as possible. Will have her see Dr. Alyssa Rudolph for next follow up to review pain medication regimen. Pt verbalized understanding, agreed with plan. Objective:     Patient Vitals for the past 24 hrs:   Temp Pulse Resp BP SpO2   01/29/22 1547 98.1 °F (36.7 °C) 98 24 96/62 99 %   01/29/22 1427 -- 90 -- 96/62 --   01/29/22 1338 -- (!) 160 -- -- --   01/29/22 1143 97.4 °F (36.3 °C) 70 26 109/77 97 %   01/29/22 1008 -- -- -- -- 94 %   01/29/22 0850 98 °F (36.7 °C) 68 18 (!) 140/67 97 %   01/29/22 0417 97.9 °F (36.6 °C) 64 18 (!) 148/70 96 %   01/29/22 0400 -- 66 -- -- --   01/29/22 0207 -- -- -- -- 94 %   01/29/22 0000 -- (!) 56 -- -- --   01/28/22 2317 97.5 °F (36.4 °C) 67 18 (!) 148/80 91 %   01/28/22 2046 -- -- -- -- 90 %   01/28/22 2000 -- 72 -- -- --   01/28/22 1944 98.6 °F (37 °C) 67 22 134/77 93 %   01/28/22 1807 -- 68 -- -- --     Oxygen Therapy  O2 Sat (%): 99 % (01/29/22 1547)  Pulse via Oximetry: 73 beats per minute (01/29/22 1008)  O2 Device: Heated; Hi flow nasal cannula (01/29/22 1008)  Skin Assessment: Clean, dry, & intact (01/28/22 2046)  O2 Flow Rate (L/min): 60 l/min (01/29/22 1008)  O2 Temperature: 87.8 °F (31 °C) (01/28/22 2046)  FIO2 (%): 100 % (01/29/22 1008)    Estimated body mass index is 27.78 kg/m² as calculated from the following:    Height as of this encounter: 5' 1\" (1.549 m). Weight as of this encounter: 66.7 kg (147 lb). Intake/Output Summary (Last 24 hours) at 1/29/2022 1610  Last data filed at 1/29/2022 0830  Gross per 24 hour   Intake 240 ml   Output --   Net 240 ml         Physical Exam:     Blood pressure 96/62, pulse 98, temperature 98.1 °F (36.7 °C), resp. rate 24, height 5' 1\" (1.549 m), weight 66.7 kg (147 lb), SpO2 99 %. General:    Well nourished. No overt distress  Head:  Normocephalic, atraumatic  Eyes:  Sclerae appear normal.  Pupils equally round.   ENT:  Nares appear normal, no drainage. Moist oral mucosa  Neck:  No restricted ROM. Trachea midline   CV:   RRR. No m/r/g. No jugular venous distension. Lungs:   Wheezing bilaterally  Abdomen: Bowel sounds present. Soft, nontender, nondistended. Extremities: No cyanosis or clubbing. No edema  Skin:     No rashes and normal coloration. Warm and dry. Neuro:  CN II-XII grossly intact. Sensation intact. A&Ox3  Psych:  Normal mood and affect.       I have reviewed ordered lab tests and independently visualized imaging below:    Recent Labs:  Recent Results (from the past 48 hour(s))   LACTIC ACID    Collection Time: 01/27/22  4:32 PM   Result Value Ref Range    Lactic acid 1.2 0.4 - 2.0 MMOL/L   D DIMER    Collection Time: 01/28/22  4:05 AM   Result Value Ref Range    D DIMER 3.40 (H) <0.56 ug/ml(FEU)   C REACTIVE PROTEIN, QT    Collection Time: 01/28/22  4:05 AM   Result Value Ref Range    C-Reactive protein 7.2 (H) 0.0 - 0.9 mg/dL   MAGNESIUM    Collection Time: 01/28/22  4:05 AM   Result Value Ref Range    Magnesium 2.2 1.8 - 2.4 mg/dL   METABOLIC PANEL, BASIC    Collection Time: 01/28/22  4:05 AM   Result Value Ref Range    Sodium 144 136 - 145 mmol/L    Potassium 4.4 3.5 - 5.1 mmol/L    Chloride 110 (H) 98 - 107 mmol/L    CO2 28 21 - 32 mmol/L    Anion gap 6 (L) 7 - 16 mmol/L    Glucose 113 (H) 65 - 100 mg/dL    BUN 36 (H) 8 - 23 MG/DL    Creatinine 0.65 0.6 - 1.0 MG/DL    GFR est AA >60 >60 ml/min/1.73m2    GFR est non-AA >60 >60 ml/min/1.73m2    Calcium 8.5 8.3 - 10.4 MG/DL   URINALYSIS W/ RFLX MICROSCOPIC    Collection Time: 01/28/22 11:05 AM   Result Value Ref Range    Color ILEANA      Appearance CLOUDY      Specific gravity 1.039 (H) 1.001 - 1.023      pH (UA) 6.0 5.0 - 9.0      Protein 100 (A) NEG mg/dL    Glucose Negative mg/dL    Ketone 40 (A) NEG mg/dL    Bilirubin SMALL (A) NEG      Blood Negative NEG      Urobilinogen 0.2 0.2 - 1.0 EU/dL    Nitrites Negative NEG      Leukocyte Esterase Negative NEG      WBC 3-5 0 /hpf    RBC 0-3 0 /hpf    Epithelial cells 0-3 0 /hpf    Bacteria 4+ (H) 0 /hpf    Other observations RESULTS VERIFIED MANUALLY     CULTURE, URINE    Collection Time: 01/28/22 11:05 AM    Specimen: Clean catch; Urine   Result Value Ref Range    Special Requests: NO SPECIAL REQUESTS      Culture result: (A)       >100,000 COLONIES/mL GRAM NEGATIVE RODS IDENTIFICATION AND SUSCEPTIBILITY TO FOLLOW    Culture result: <1,000 CFU/ML MIXED SKIN SCOTT ISOLATED     GLUCOSE, POC    Collection Time: 01/28/22  4:32 PM   Result Value Ref Range    Glucose (POC) 118 (H) 65 - 100 mg/dL    Performed by Wm    PROCALCITONIN    Collection Time: 01/29/22  3:52 AM   Result Value Ref Range    Procalcitonin 1.15 (H) 0.00 - 0.49 ng/mL   D DIMER    Collection Time: 01/29/22  3:52 AM   Result Value Ref Range    D DIMER 3.16 (H) <0.56 ug/ml(FEU)   C REACTIVE PROTEIN, QT    Collection Time: 01/29/22  3:52 AM   Result Value Ref Range    C-Reactive protein 3.1 (H) 0.0 - 0.9 mg/dL   METABOLIC PANEL, BASIC    Collection Time: 01/29/22  3:52 AM   Result Value Ref Range    Sodium 144 136 - 145 mmol/L    Potassium 4.1 3.5 - 5.1 mmol/L    Chloride 109 (H) 98 - 107 mmol/L    CO2 30 21 - 32 mmol/L    Anion gap 5 (L) 7 - 16 mmol/L    Glucose 89 65 - 100 mg/dL    BUN 41 (H) 8 - 23 MG/DL    Creatinine 0.75 0.6 - 1.0 MG/DL    GFR est AA >60 >60 ml/min/1.73m2    GFR est non-AA >60 >60 ml/min/1.73m2    Calcium 8.7 8.3 - 10.4 MG/DL   CBC WITH AUTOMATED DIFF    Collection Time: 01/29/22  3:52 AM   Result Value Ref Range    WBC 9.9 4.3 - 11.1 K/uL    RBC 3.96 (L) 4.05 - 5.2 M/uL    HGB 11.2 (L) 11.7 - 15.4 g/dL    HCT 35.9 35.8 - 46.3 %    MCV 90.7 79.6 - 97.8 FL    MCH 28.3 26.1 - 32.9 PG    MCHC 31.2 (L) 31.4 - 35.0 g/dL    RDW 14.2 11.9 - 14.6 %    PLATELET 032 060 - 671 K/uL    MPV 9.9 9.4 - 12.3 FL    ABSOLUTE NRBC 0.00 0.0 - 0.2 K/uL    DF AUTOMATED      NEUTROPHILS 78 43 - 78 %    LYMPHOCYTES 13 13 - 44 %    MONOCYTES 8 4.0 - 12.0 % EOSINOPHILS 0 (L) 0.5 - 7.8 %    BASOPHILS 0 0.0 - 2.0 %    IMMATURE GRANULOCYTES 1 0.0 - 5.0 %    ABS. NEUTROPHILS 7.8 1.7 - 8.2 K/UL    ABS. LYMPHOCYTES 1.3 0.5 - 4.6 K/UL    ABS. MONOCYTES 0.8 0.1 - 1.3 K/UL    ABS. EOSINOPHILS 0.0 0.0 - 0.8 K/UL    ABS. BASOPHILS 0.0 0.0 - 0.2 K/UL    ABS. IMM. GRANS. 0.1 0.0 - 0.5 K/UL   GLUCOSE, POC    Collection Time: 01/29/22  8:05 AM   Result Value Ref Range    Glucose (POC) 80 65 - 100 mg/dL    Performed by ToneyRitaPCT    EKG, 12 LEAD, SUBSEQUENT    Collection Time: 01/29/22  1:38 PM   Result Value Ref Range    Ventricular Rate 157 BPM    Atrial Rate 174 BPM    QRS Duration 78 ms    Q-T Interval 358 ms    QTC Calculation (Bezet) 578 ms    Calculated R Axis -23 degrees    Calculated T Axis -82 degrees    Diagnosis       Atrial fibrillation with rapid ventricular response  Minimal voltage criteria for LVH, may be normal variant  Non-specific ST-t wave changes  Abnormal ECG  When compared with ECG of 26-JAN-2022 12:51,  PREVIOUS ECG IS PRESENT  Confirmed by ADELA WORTHINGTON (UC), BERNADETTE LOVE ([id number]) on 1/29/2022 4:05:08 PM         All Micro Results     Procedure Component Value Units Date/Time    CULTURE, URINE [[id number]]  (Abnormal) Collected: 01/28/22 1105    Order Status: Completed Specimen: Urine from Clean catch Updated: 01/29/22 0819     Special Requests: NO SPECIAL REQUESTS        Culture result:       >100,000 COLONIES/mL GRAM NEGATIVE RODS IDENTIFICATION AND SUSCEPTIBILITY TO FOLLOW                  <1,000 CFU/ML MIXED SKIN SCOTT ISOLATED          COVID-19 RAPID TEST [[id number]]  (Abnormal) Collected: 01/26/22 1258    Order Status: Completed Specimen: Nasopharyngeal Updated: 01/26/22 1316     Specimen source NASAL        COVID-19 rapid test Detected        Comment:      The specimen is POSITIVE for SARS-CoV-2, the novel coronavirus associated with COVID-19.        This test has been authorized by the FDA under an Emergency Use Authorization (EUA) for use by authorized laboratories. Fact sheet for Healthcare Providers: ConventionUpdate.co.nz  Fact sheet for Patients: ConventionUpdate.co.nz       Methodology: Isothermal Nucleic Acid Amplification               Other Studies:  No results found.     Current Meds:  Current Facility-Administered Medications   Medication Dose Route Frequency    [Held by provider] metoprolol tartrate (LOPRESSOR) tablet 25 mg  25 mg Oral BID    enoxaparin (LOVENOX) injection 70 mg  70 mg SubCUTAneous Q12H    metoprolol tartrate (LOPRESSOR) tablet 50 mg  50 mg Oral Q12H    traMADoL (ULTRAM) tablet 100 mg  100 mg Oral Q8H PRN    insulin lispro (HUMALOG) injection   SubCUTAneous ACB&D    baricitinib (OLUMIANT) tablet 2 mg  2 mg Oral DAILY    dexlansoprazole (DEXILANT) capsule (delayed release) CpDB 60 mg (Patient Supplied)  60 mg Oral DAILY    sodium chloride (NS) flush 5-10 mL  5-10 mL IntraVENous Q8H    sodium chloride (NS) flush 5-10 mL  5-10 mL IntraVENous PRN    sodium chloride (NS) flush 5-40 mL  5-40 mL IntraVENous Q8H    sodium chloride (NS) flush 5-40 mL  5-40 mL IntraVENous PRN    acetaminophen (TYLENOL) tablet 650 mg  650 mg Oral Q6H PRN    Or    acetaminophen (TYLENOL) suppository 650 mg  650 mg Rectal Q6H PRN    polyethylene glycol (MIRALAX) packet 17 g  17 g Oral DAILY PRN    ondansetron (ZOFRAN ODT) tablet 4 mg  4 mg Oral Q8H PRN    Or    ondansetron (ZOFRAN) injection 4 mg  4 mg IntraVENous Q6H PRN    guaiFENesin-dextromethorphan (ROBITUSSIN DM) 100-10 mg/5 mL syrup 5 mL  5 mL Oral Q4H PRN    dexamethasone (DECADRON) 10 mg/mL injection 6 mg  6 mg IntraVENous Q24H    cholecalciferol (VITAMIN D3) (1000 Units /25 mcg) tablet 2,000 Units  2,000 Units Oral DAILY    alum-mag hydroxide-simeth (MYLANTA) oral suspension 15 mL  15 mL Oral Q6H PRN    albuterol (PROVENTIL HFA, VENTOLIN HFA, PROAIR HFA) inhaler 2 Puff  2 Puff Inhalation Q4H PRN    atorvastatin (LIPITOR) tablet 40 mg  40 mg Oral QHS    gabapentin (NEURONTIN) capsule 300 mg  300 mg Oral TID PRN    temazepam (RESTORIL) capsule 15 mg  15 mg Oral QHS PRN       Signed:  Elisa Hernandez MD    Part of this note may have been written by using a voice dictation software. The note has been proof read but may still contain some grammatical/other typographical errors.

## 2022-01-29 NOTE — PROGRESS NOTES
Patient in bed resting quietly on heated hi flow 60L 100% patient denies needs at this time. Patient denies pain at this time. Safety measures in place. Preparing report to oncoming RN.

## 2022-01-30 ENCOUNTER — APPOINTMENT (OUTPATIENT)
Dept: GENERAL RADIOLOGY | Age: 87
DRG: 871 | End: 2022-01-30
Attending: STUDENT IN AN ORGANIZED HEALTH CARE EDUCATION/TRAINING PROGRAM
Payer: MEDICARE

## 2022-01-30 LAB
ANION GAP SERPL CALC-SCNC: 6 MMOL/L (ref 7–16)
BACTERIA SPEC CULT: ABNORMAL
BACTERIA SPEC CULT: ABNORMAL
BASOPHILS # BLD: 0 K/UL (ref 0–0.2)
BASOPHILS NFR BLD: 0 % (ref 0–2)
BUN SERPL-MCNC: 39 MG/DL (ref 8–23)
CALCIUM SERPL-MCNC: 8.4 MG/DL (ref 8.3–10.4)
CHLORIDE SERPL-SCNC: 108 MMOL/L (ref 98–107)
CO2 SERPL-SCNC: 29 MMOL/L (ref 21–32)
CREAT SERPL-MCNC: 0.64 MG/DL (ref 0.6–1)
DIFFERENTIAL METHOD BLD: ABNORMAL
EOSINOPHIL # BLD: 0 K/UL (ref 0–0.8)
EOSINOPHIL NFR BLD: 0 % (ref 0.5–7.8)
ERYTHROCYTE [DISTWIDTH] IN BLOOD BY AUTOMATED COUNT: 13.7 % (ref 11.9–14.6)
GLUCOSE BLD STRIP.AUTO-MCNC: 170 MG/DL (ref 65–100)
GLUCOSE BLD STRIP.AUTO-MCNC: 83 MG/DL (ref 65–100)
GLUCOSE BLD STRIP.AUTO-MCNC: 96 MG/DL (ref 65–100)
GLUCOSE SERPL-MCNC: 90 MG/DL (ref 65–100)
HCT VFR BLD AUTO: 34.3 % (ref 35.8–46.3)
HGB BLD-MCNC: 11 G/DL (ref 11.7–15.4)
IMM GRANULOCYTES # BLD AUTO: 0.1 K/UL (ref 0–0.5)
IMM GRANULOCYTES NFR BLD AUTO: 1 % (ref 0–5)
LYMPHOCYTES # BLD: 1.4 K/UL (ref 0.5–4.6)
LYMPHOCYTES NFR BLD: 15 % (ref 13–44)
MAGNESIUM SERPL-MCNC: 2.2 MG/DL (ref 1.8–2.4)
MCH RBC QN AUTO: 28.4 PG (ref 26.1–32.9)
MCHC RBC AUTO-ENTMCNC: 32.1 G/DL (ref 31.4–35)
MCV RBC AUTO: 88.4 FL (ref 79.6–97.8)
MONOCYTES # BLD: 0.7 K/UL (ref 0.1–1.3)
MONOCYTES NFR BLD: 8 % (ref 4–12)
NEUTS SEG # BLD: 7 K/UL (ref 1.7–8.2)
NEUTS SEG NFR BLD: 76 % (ref 43–78)
NRBC # BLD: 0 K/UL (ref 0–0.2)
PHOSPHATE SERPL-MCNC: 3.8 MG/DL (ref 2.3–3.7)
PLATELET # BLD AUTO: 453 K/UL (ref 150–450)
PMV BLD AUTO: 10.2 FL (ref 9.4–12.3)
POTASSIUM SERPL-SCNC: 4.1 MMOL/L (ref 3.5–5.1)
RBC # BLD AUTO: 3.88 M/UL (ref 4.05–5.2)
SERVICE CMNT-IMP: ABNORMAL
SERVICE CMNT-IMP: ABNORMAL
SERVICE CMNT-IMP: NORMAL
SERVICE CMNT-IMP: NORMAL
SODIUM SERPL-SCNC: 143 MMOL/L (ref 136–145)
WBC # BLD AUTO: 9.3 K/UL (ref 4.3–11.1)

## 2022-01-30 PROCEDURE — 85025 COMPLETE CBC W/AUTO DIFF WBC: CPT

## 2022-01-30 PROCEDURE — 84100 ASSAY OF PHOSPHORUS: CPT

## 2022-01-30 PROCEDURE — 74011000258 HC RX REV CODE- 258: Performed by: STUDENT IN AN ORGANIZED HEALTH CARE EDUCATION/TRAINING PROGRAM

## 2022-01-30 PROCEDURE — 82962 GLUCOSE BLOOD TEST: CPT

## 2022-01-30 PROCEDURE — 74011636637 HC RX REV CODE- 636/637: Performed by: STUDENT IN AN ORGANIZED HEALTH CARE EDUCATION/TRAINING PROGRAM

## 2022-01-30 PROCEDURE — 71045 X-RAY EXAM CHEST 1 VIEW: CPT

## 2022-01-30 PROCEDURE — 94762 N-INVAS EAR/PLS OXIMTRY CONT: CPT

## 2022-01-30 PROCEDURE — 99231 SBSQ HOSP IP/OBS SF/LOW 25: CPT | Performed by: INTERNAL MEDICINE

## 2022-01-30 PROCEDURE — 74011250636 HC RX REV CODE- 250/636: Performed by: STUDENT IN AN ORGANIZED HEALTH CARE EDUCATION/TRAINING PROGRAM

## 2022-01-30 PROCEDURE — 77010033711 HC HIGH FLOW OXYGEN

## 2022-01-30 PROCEDURE — 65660000000 HC RM CCU STEPDOWN

## 2022-01-30 PROCEDURE — 74011000250 HC RX REV CODE- 250: Performed by: FAMILY MEDICINE

## 2022-01-30 PROCEDURE — 74011250637 HC RX REV CODE- 250/637: Performed by: FAMILY MEDICINE

## 2022-01-30 PROCEDURE — 74011250636 HC RX REV CODE- 250/636: Performed by: FAMILY MEDICINE

## 2022-01-30 PROCEDURE — 74011250637 HC RX REV CODE- 250/637: Performed by: STUDENT IN AN ORGANIZED HEALTH CARE EDUCATION/TRAINING PROGRAM

## 2022-01-30 PROCEDURE — 80048 BASIC METABOLIC PNL TOTAL CA: CPT

## 2022-01-30 PROCEDURE — 74011000250 HC RX REV CODE- 250: Performed by: STUDENT IN AN ORGANIZED HEALTH CARE EDUCATION/TRAINING PROGRAM

## 2022-01-30 PROCEDURE — 36415 COLL VENOUS BLD VENIPUNCTURE: CPT

## 2022-01-30 PROCEDURE — 83735 ASSAY OF MAGNESIUM: CPT

## 2022-01-30 PROCEDURE — 74011250637 HC RX REV CODE- 250/637: Performed by: NURSE PRACTITIONER

## 2022-01-30 RX ADMIN — SODIUM CHLORIDE, PRESERVATIVE FREE 5 ML: 5 INJECTION INTRAVENOUS at 06:19

## 2022-01-30 RX ADMIN — BARICITINIB 2 MG: 2 TABLET, FILM COATED ORAL at 10:37

## 2022-01-30 RX ADMIN — DEXAMETHASONE SODIUM PHOSPHATE 6 MG: 10 INJECTION, SOLUTION INTRAMUSCULAR; INTRAVENOUS at 10:38

## 2022-01-30 RX ADMIN — SODIUM CHLORIDE, PRESERVATIVE FREE 10 ML: 5 INJECTION INTRAVENOUS at 15:25

## 2022-01-30 RX ADMIN — DEXLANSOPRAZOLE 60 MG: 60 CAPSULE, DELAYED RELEASE ORAL at 09:00

## 2022-01-30 RX ADMIN — METOPROLOL TARTRATE 50 MG: 50 TABLET, FILM COATED ORAL at 10:37

## 2022-01-30 RX ADMIN — METOPROLOL TARTRATE 50 MG: 50 TABLET, FILM COATED ORAL at 21:06

## 2022-01-30 RX ADMIN — Medication 2 UNITS: at 16:30

## 2022-01-30 RX ADMIN — ATORVASTATIN CALCIUM 40 MG: 40 TABLET, FILM COATED ORAL at 21:06

## 2022-01-30 RX ADMIN — APIXABAN 5 MG: 5 TABLET, FILM COATED ORAL at 18:12

## 2022-01-30 RX ADMIN — VITAMIN D, TAB 1000IU (100/BT) 2000 UNITS: 25 TAB at 10:36

## 2022-01-30 RX ADMIN — SODIUM CHLORIDE, PRESERVATIVE FREE 5 ML: 5 INJECTION INTRAVENOUS at 21:06

## 2022-01-30 RX ADMIN — TEMAZEPAM 15 MG: 15 CAPSULE ORAL at 21:06

## 2022-01-30 RX ADMIN — ENOXAPARIN SODIUM 70 MG: 100 INJECTION SUBCUTANEOUS at 02:06

## 2022-01-30 RX ADMIN — ENOXAPARIN SODIUM 70 MG: 100 INJECTION SUBCUTANEOUS at 15:00

## 2022-01-30 RX ADMIN — CEFEPIME HYDROCHLORIDE 2 G: 2 INJECTION, POWDER, FOR SOLUTION INTRAVENOUS at 10:36

## 2022-01-30 NOTE — PROGRESS NOTES
Report received from [facility], [address] [place] [address]. Patient in bed resting. Respirations even and unlabored. On airvo 60L/100%. All needs addressed. Safety measures in place. Call light in reach. No signs of acute distress at this time. Will continue to monitor.

## 2022-01-30 NOTE — PROGRESS NOTES
[facility] CARDIOLOGY PROGRESS NOTE           1/30/2022 11:15 AM    Admit Date: 1/26/2022      Subjective:   No cp or inc sob. Objective:      Vitals:    01/29/22 2249 01/30/22 0241 01/30/22 0747 01/30/22 1048   BP: 124/68 119/79 (!) 143/74 127/71   Pulse: 66 (!) 52 (!) 57 (!) 57   Resp: 20 18 20    Temp: 98.2 °F (36.8 °C) 98.3 °F (36.8 °C) 97.5 °F (36.4 °C) 98.7 °F (37.1 °C)   SpO2: 91% 93% 95% 96%   Weight:       Height:           Physical Exam:  General-No Acute Distress, on O2  Neck- supple, no JVD  CV- regular rate and rhythm no MRG  Lung- clear bilaterally  Abd- soft, nontender, nondistended  Ext- no edema bilaterally. Skin- warm and dry    Data Review:   Recent Labs     01/30/22  0344 01/29/22  0352 01/28/22  0405 01/28/22  0405    144   < > 144   K 4.1 4.1   < > 4.4   MG 2.2  --   --  2.2   BUN 39* 41*   < > 36*   CREA 0.64 0.75   < > 0.65   GLU 90 89   < > 113*   WBC 9.3 9.9  --   --    HGB 11.0* 11.2*  --   --    HCT 34.3* 35.9  --   --    * 401  --   --     < > = values in this interval not displayed. Assessment/Plan:     Principal Problem:    Acute hypoxemic respiratory failure due to COVID-19 [facility]) (1/26/2022)        Active Problems:    Essential (primary) hypertension (10/28/2016)       PAF    /////    She has converted back to a sinus bradycardia. Continue metoprolol  ?  Lovenox>>DOAC Cherylene Leghorn, MD  1/30/2022 11:15 AM

## 2022-01-30 NOTE — PROGRESS NOTES
Hospitalist Progress Note   Admit Date:  2022 12:32 PM   Name:  Meggan Mackay   Age:  80 y.o. Sex:  female  :  1935   MRN:  [id number]   Room:  [facility]    Presenting Complaint: Positive For Covid-19    Reason(s) for Admission: Acute hypoxemic respiratory failure due to COVID-19 [facility]) [U07.1, J96.01]     Hospital Course & Interval History:   Meggan Mackay is a 80 y.o. female with medical history of HTN, mixed HLD, GERD, Glaucoma, Hay fever,  HLD, insomnia who presented from home via EMS with hypoxia and AMS with recent diagnosis of COVID-19. EMS reports patient was hypoxic on arrival, O2 saturation in the 60s.  Placed on 6 L and given terbutaline.  EMS also noted family some concern for patient being more altered than normal.     In ED, T102 BP 91/56    spo2 74% RA, 85% 6L NC, 91% 55L/m  LA 3.9 CRP 17.3 D dimer 3.85 NT pro BNP 3481 HS trop 2952>9998   CXR BL infiltrates   rec'd Tylenol 1000 mg and decadron 10 mg IV      EKG shows sinus tachycardia, rate 115, , QRS 92, QTc 491     She is alert and oriented to person, place  but not month or year. Unable to get in touch with either son or . She notes symptom onset 5 days ago. Patient denies CP or palpitations, nausea, vomiting, diarrhea, fevers. +chills, +cough. Says  is sick. Discussed Actemra with patient including risks and benefits.      satting 94% on 55/90% airvo. Subjective (22): Patient seen and examined and continues to complain of shortness of breath and  denies fever chills. Assessment & Plan:     Acute Hypoxemic Respiratory Failure 2/2 COVID-19 PNA   Suspected Cytokine Storm with elevated markers   - COVID +  ,  Unvaccinated   - Decadron 6 mg x 10 doses   -Started on Bacitricininb  - Charted O2 Flow Rate (L/min): 55 l/min.         New onset Afib  EKG showed A. fib with RVR with HR 170s  Troponins were elevated and down trended  Lilian vasc score of 4 and stroke risk of 4.8% per yr  Continue metoprolol  Transitioned from Lovenox to Eliquis   echo with normal EF   Cardiology consulted  -    Sepsis 2/2 COVID -   Resolved   admin 30 cc/kg bolus. 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 Pro calcitonin elevated 1.15  Ct chest showed covid and no PE       Troponemia  Cardiac enzymes downtrending. EKG does not demonstrate ST segment elevation. Cardiology consulted Hypercoagulable state associated with COVID-19 -      Hypokalemia // Hypomagnesemia - associated with prolonged QTc   Resolved    Acute Encephalopathy -  Resolved       Chronic insomnia - takes temazepam 30 mg nightly for this. At risk of withdrawal. Reduce dose to 15 mg as needed.      Lumbar spondylosis - f/b pain management. Takes gabapentin 800 mg BID, tramadol 100 mg BID.  Reduce gabapentin 300 mg TID prn, hold tramadol for now.        HLD - continue  atorvastatin      H/o HTN - Normotensive Monitor      GERD - resume protonix.      There is a high probability of acute organ impairment or life-threatening deterioration in the patient's condition from: Sepsis, acute respiratory failure on HFNC, VTE, severe electrolyte abnormalities     Diet:  ADULT DIET Easy to Chew; Low Fat/Low Chol/High Fiber/EVA  DVT PPx:  Code status: Full Code    Hospital Problems as of 1/30/2022 Date Reviewed: 4/12/2018          Codes Class Noted - Resolved POA    * (Principal) [facility] [place] [address]) ICD-10-CM: U07.1, J96.01  ICD-9-CM: 518.81, 079.89, 799.02  1/26/2022 - Present Yes        [facility] [place] [address]) ICD-10-CM: U07.1, A41.89  ICD-9-CM: 079.89, 995.91  1/26/2022 - Present Yes        Hypomagnesemia ICD-10-CM: U45.11  ICD-9-CM: 275.2  1/26/2022 - Present Yes        Hypokalemia ICD-10-CM: E87.6  ICD-9-CM: 276.8  1/26/2022 - Present Yes        Prolonged Q-T interval on ECG ICD-10-CM: R94.31  ICD-9-CM: 794.31  1/26/2022 - Present Yes        Elevated troponin ICD-10-CM: R77.8  ICD-9-CM: 790.6  1/26/2022 - Present Yes        Toxic metabolic encephalopathy DXD-52-NH: G92.8  ICD-9-CM: 349.82  1/26/2022 - Present Yes        Chronic prescription benzodiazepine use ICD-10-CM: Z79.899  ICD-9-CM: V58.69  1/26/2022 - Present Yes        Hypercoagulable state associated with COVID-19 [facility]) ICD-10-CM: U07.1, D68.69  ICD-9-CM: 289.82, 079.89  1/26/2022 - Present Yes        Transaminitis ICD-10-CM: R74.01  ICD-9-CM: 790.4  1/26/2022 - Present Yes        Gastroesophageal reflux disease without esophagitis ICD-10-CM: K21.9  ICD-9-CM: 530.81  9/28/2017 - Present Yes    Overview Signed 1/9/2018 10:12 AM by Jose Hamilton DO     Last Assessment & Plan:   Stable. Essential (primary) hypertension ICD-10-CM: I10  ICD-9-CM: 401.9  10/28/2016 - Present Yes    Overview Signed 1/9/2018 10:12 AM by Jose Hamilton DO     Last Assessment & Plan:   Stable. Hyperlipidemia ICD-10-CM: E78.5  ICD-9-CM: 272.4  10/28/2016 - Present Yes    Overview Signed 1/9/2018 10:12 AM by Jose Hamilton DO     Last Assessment & Plan:   Return for fasting labs. Insomnia, persistent ICD-10-CM: G47.00  ICD-9-CM: 307.42  4/27/2016 - Present Yes    Overview Signed 1/9/2018 10:12 AM by Jose Hamilton DO     Last Assessment & Plan:   Restoril prescription printed & given. Primary osteoarthritis involving multiple joints ICD-10-CM: M89.49  ICD-9-CM: 715.98  7/31/2015 - Present Yes    Overview Signed 1/9/2018 10:12 AM by Jose Hamilton DO     Last Assessment & Plan:   3 Norco scripts printed with do not fill before dates of 10/10, 11/9, 12/9. Tramadol prescription printed & given (decreased from 150 to 120 tablets per month based on how patient is taking it). Aware of risk of sedation and aware to get prescriptions only in our office.  Discussed polypharmacy & fall risk with patient's multiple sedating medications, encouraged to limit use as much as possible. Will have her see Dr. Ehsan Reilly for next follow up to review pain medication regimen. Pt verbalized understanding, agreed with plan. Objective:     Patient Vitals for the past 24 hrs:   Temp Pulse Resp BP SpO2   01/30/22 1454 97.7 °F (36.5 °C) 60 -- 104/70 98 %   01/30/22 1048 98.7 °F (37.1 °C) (!) 57 20 127/71 96 %   01/30/22 0747 97.5 °F (36.4 °C) (!) 57 20 (!) 143/74 95 %   01/30/22 0241 98.3 °F (36.8 °C) (!) 52 18 119/79 93 %   01/29/22 2249 98.2 °F (36.8 °C) 66 20 124/68 91 %   01/29/22 2102 -- (!) 59 -- 138/76 --   01/29/22 2000 -- 69 -- -- --   01/29/22 1940 98.4 °F (36.9 °C) 73 22 109/67 96 %   01/29/22 1547 98.1 °F (36.7 °C) 98 24 96/62 99 %     Oxygen Therapy  O2 Sat (%): 98 % (01/30/22 1454)  Pulse via Oximetry: 73 beats per minute (01/29/22 1008)  O2 Device: Heated; Hi flow nasal cannula (01/30/22 1346)  Skin Assessment: Clean, dry, & intact (01/28/22 2046)  O2 Flow Rate (L/min): 60 l/min (01/30/22 1346)  O2 Temperature: 87.8 °F (31 °C) (01/28/22 2046)  FIO2 (%): 100 % (01/30/22 1346)    Estimated body mass index is 27.78 kg/m² as calculated from the following:    Height as of this encounter: 5' 1\" (1.549 m). Weight as of this encounter: 66.7 kg (147 lb). Intake/Output Summary (Last 24 hours) at 1/30/2022 1537  Last data filed at 1/30/2022 1404  Gross per 24 hour   Intake 480 ml   Output 350 ml   Net 130 ml         Physical Exam:     Blood pressure 104/70, pulse 60, temperature 97.7 °F (36.5 °C), resp. rate 20, height 5' 1\" (1.549 m), weight 66.7 kg (147 lb), SpO2 98 %. General:    Well nourished. No overt distress  Head:  Normocephalic, atraumatic  Eyes:  Sclerae appear normal.  Pupils equally round. ENT:  Nares appear normal, no drainage. Moist oral mucosa  Neck:  No restricted ROM. Trachea midline   CV:   RRR. No m/r/g. No jugular venous distension. Lungs:   Wheezing bilaterally  Abdomen: Bowel sounds present. Soft, nontender, nondistended.   Extremities: No cyanosis or clubbing. No edema  Skin:     No rashes and normal coloration. Warm and dry. Neuro:  CN II-XII grossly intact. Sensation intact. A&Ox3  Psych:  Normal mood and affect. I have reviewed ordered lab tests and independently visualized imaging below:    Recent Labs:  Recent Results (from the past 48 hour(s))   GLUCOSE, POC    Collection Time: 01/28/22  4:32 PM   Result Value Ref Range    Glucose (POC) 118 (H) 65 - 100 mg/dL    Performed by Wm    PROCALCITONIN    Collection Time: 01/29/22  3:52 AM   Result Value Ref Range    Procalcitonin 1.15 (H) 0.00 - 0.49 ng/mL   D DIMER    Collection Time: 01/29/22  3:52 AM   Result Value Ref Range    D DIMER 3.16 (H) <0.56 ug/ml(FEU)   C REACTIVE PROTEIN, QT    Collection Time: 01/29/22  3:52 AM   Result Value Ref Range    C-Reactive protein 3.1 (H) 0.0 - 0.9 mg/dL   METABOLIC PANEL, BASIC    Collection Time: 01/29/22  3:52 AM   Result Value Ref Range    Sodium 144 136 - 145 mmol/L    Potassium 4.1 3.5 - 5.1 mmol/L    Chloride 109 (H) 98 - 107 mmol/L    CO2 30 21 - 32 mmol/L    Anion gap 5 (L) 7 - 16 mmol/L    Glucose 89 65 - 100 mg/dL    BUN 41 (H) 8 - 23 MG/DL    Creatinine 0.75 0.6 - 1.0 MG/DL    GFR est AA >60 >60 ml/min/1.73m2    GFR est non-AA >60 >60 ml/min/1.73m2    Calcium 8.7 8.3 - 10.4 MG/DL   CBC WITH AUTOMATED DIFF    Collection Time: 01/29/22  3:52 AM   Result Value Ref Range    WBC 9.9 4.3 - 11.1 K/uL    RBC 3.96 (L) 4.05 - 5.2 M/uL    HGB 11.2 (L) 11.7 - 15.4 g/dL    HCT 35.9 35.8 - 46.3 %    MCV 90.7 79.6 - 97.8 FL    MCH 28.3 26.1 - 32.9 PG    MCHC 31.2 (L) 31.4 - 35.0 g/dL    RDW 14.2 11.9 - 14.6 %    PLATELET 947 855 - 647 K/uL    MPV 9.9 9.4 - 12.3 FL    ABSOLUTE NRBC 0.00 0.0 - 0.2 K/uL    DF AUTOMATED      NEUTROPHILS 78 43 - 78 %    LYMPHOCYTES 13 13 - 44 %    MONOCYTES 8 4.0 - 12.0 %    EOSINOPHILS 0 (L) 0.5 - 7.8 %    BASOPHILS 0 0.0 - 2.0 %    IMMATURE GRANULOCYTES 1 0.0 - 5.0 %    ABS.  NEUTROPHILS 7.8 1.7 - 8.2 K/UL ABS. LYMPHOCYTES 1.3 0.5 - 4.6 K/UL    ABS. MONOCYTES 0.8 0.1 - 1.3 K/UL    ABS. EOSINOPHILS 0.0 0.0 - 0.8 K/UL    ABS. BASOPHILS 0.0 0.0 - 0.2 K/UL    ABS. IMM.  GRANS. 0.1 0.0 - 0.5 K/UL   GLUCOSE, POC    Collection Time: 01/29/22  8:05 AM   Result Value Ref Range    Glucose (POC) 80 65 - 100 mg/dL    Performed by [facility]    EKG, 12 LEAD, SUBSEQUENT    Collection Time: 01/29/22  1:38 PM   Result Value Ref Range    Ventricular Rate 157 BPM    Atrial Rate 174 BPM    QRS Duration 78 ms    Q-T Interval 358 ms    QTC Calculation (Bezet) 578 ms    Calculated R Axis -23 degrees    Calculated T Axis -82 degrees    Diagnosis       Atrial fibrillation with rapid ventricular response  Minimal voltage criteria for LVH, may be normal variant  Non-specific ST-t wave changes  Abnormal ECG  When compared with ECG of 26-JAN-2022 12:51,  PREVIOUS ECG IS PRESENT  Confirmed by ADELA WORTHINGTON (), BERNADETTE LOVE ([id number]) on 1/29/2022 4:05:08 PM     CK    Collection Time: 01/29/22  3:19 PM   Result Value Ref Range    CK 42 21 - 215 U/L   TROPONIN-HIGH SENSITIVITY    Collection Time: 01/29/22  3:19 PM   Result Value Ref Range    Troponin-High Sensitivity 124.6 (HH) 0 - 14 pg/mL   GLUCOSE, POC    Collection Time: 01/29/22  5:09 PM   Result Value Ref Range    Glucose (POC) 170 (H) 65 - 100 mg/dL    Performed by [facility]    METABOLIC PANEL, BASIC    Collection Time: 01/30/22  3:44 AM   Result Value Ref Range    Sodium 143 136 - 145 mmol/L    Potassium 4.1 3.5 - 5.1 mmol/L    Chloride 108 (H) 98 - 107 mmol/L    CO2 29 21 - 32 mmol/L    Anion gap 6 (L) 7 - 16 mmol/L    Glucose 90 65 - 100 mg/dL    BUN 39 (H) 8 - 23 MG/DL    Creatinine 0.64 0.6 - 1.0 MG/DL    GFR est AA >60 >60 ml/min/1.73m2    GFR est non-AA >60 >60 ml/min/1.73m2    Calcium 8.4 8.3 - 10.4 MG/DL   CBC WITH AUTOMATED DIFF    Collection Time: 01/30/22  3:44 AM   Result Value Ref Range    WBC 9.3 4.3 - 11.1 K/uL    RBC 3.88 (L) 4.05 - 5.2 M/uL    HGB 11.0 (L) 11.7 - 15.4 g/dL HCT 34.3 (L) 35.8 - 46.3 %    MCV 88.4 79.6 - 97.8 FL    MCH 28.4 26.1 - 32.9 PG    MCHC 32.1 31.4 - 35.0 g/dL    RDW 13.7 11.9 - 14.6 %    PLATELET 691 (H) 130 - 450 K/uL    MPV 10.2 9.4 - 12.3 FL    ABSOLUTE NRBC 0.00 0.0 - 0.2 K/uL    DF AUTOMATED      NEUTROPHILS 76 43 - 78 %    LYMPHOCYTES 15 13 - 44 %    MONOCYTES 8 4.0 - 12.0 %    EOSINOPHILS 0 (L) 0.5 - 7.8 %    BASOPHILS 0 0.0 - 2.0 %    IMMATURE GRANULOCYTES 1 0.0 - 5.0 %    ABS. NEUTROPHILS 7.0 1.7 - 8.2 K/UL    ABS. LYMPHOCYTES 1.4 0.5 - 4.6 K/UL    ABS. MONOCYTES 0.7 0.1 - 1.3 K/UL    ABS. EOSINOPHILS 0.0 0.0 - 0.8 K/UL    ABS. BASOPHILS 0.0 0.0 - 0.2 K/UL    ABS. IMM. GRANS. 0.1 0.0 - 0.5 K/UL   MAGNESIUM    Collection Time: 01/30/22  3:44 AM   Result Value Ref Range    Magnesium 2.2 1.8 - 2.4 mg/dL   PHOSPHORUS    Collection Time: 01/30/22  3:44 AM   Result Value Ref Range    Phosphorus 3.8 (H) 2.3 - 3.7 MG/DL   GLUCOSE, POC    Collection Time: 01/30/22  7:45 AM   Result Value Ref Range    Glucose (POC) 83 65 - 100 mg/dL    Performed by Manda    GLUCOSE, POC    Collection Time: 01/30/22 11:18 AM   Result Value Ref Range    Glucose (POC) 96 65 - 100 mg/dL    Performed by Steve        All Micro Results     Procedure Component Value Units Date/Time    CULTURE, URINE [[id number]]  (Abnormal)  (Susceptibility) Collected: 01/28/22 1103    Order Status: Completed Specimen: Urine from Clean catch Updated: 01/30/22 0477     Special Requests: NO SPECIAL REQUESTS        Culture result:       >100,000 COLONIES/mL ESCHERICHIA COLI                  <1,000 CFU/ML MIXED SKIN SCOTT ISOLATED          COVID-19 RAPID TEST [[id number]]  (Abnormal) Collected: 01/26/22 1258    Order Status: Completed Specimen: Nasopharyngeal Updated: 01/26/22 1316     Specimen source NASAL        COVID-19 rapid test Detected        Comment:      The specimen is POSITIVE for SARS-CoV-2, the novel coronavirus associated with COVID-19.        This test has been authorized by the FDA under an Emergency Use Authorization (EUA) for use by authorized laboratories. Fact sheet for Healthcare Providers: ConventionUpdate.co.nz  Fact sheet for Patients: ConventionUpdate.co.nz       Methodology: Isothermal Nucleic Acid Amplification               Other Studies:  XR CHEST SNGL V    Result Date: 1/30/2022  Chest portable CLINICAL INDICATION: Subacute worsening moderate to severe hypoxia with shortness of breath, respiratory distress, Covid-19 positive COMPARISON: 1/26/2022 radiograph and CT TECHNIQUE: single AP portable view chest at 10:52 AM semiupright FINDINGS: Shallow lung volumes leading to crowding. Stable mediastinal and hilar contours, cardiac silhouette again enlarged. Slight interval worsening of the diffuse bilateral reticular and groundglass infiltrates, now with increased small area of partial peripheral consolidation at the left base. No pneumothorax, increasing effusion, or complete lobar consolidation. Slightly worsened diffuse bilateral Covid-19 pneumonia.        Current Meds:  Current Facility-Administered Medications   Medication Dose Route Frequency    cefepime (MAXIPIME) 2 g in 0.9% sodium chloride (MBP/ADV) 100 mL MBP  2 g IntraVENous Q24H    enoxaparin (LOVENOX) injection 70 mg  70 mg SubCUTAneous Q12H    metoprolol tartrate (LOPRESSOR) tablet 50 mg  50 mg Oral Q12H    traMADoL (ULTRAM) tablet 100 mg  100 mg Oral Q8H PRN    insulin lispro (HUMALOG) injection   SubCUTAneous ACB&D    baricitinib (OLUMIANT) tablet 2 mg  2 mg Oral DAILY    dexlansoprazole (DEXILANT) capsule (delayed release) CpDB 60 mg (Patient Supplied)  60 mg Oral DAILY    sodium chloride (NS) flush 5-10 mL  5-10 mL IntraVENous Q8H    sodium chloride (NS) flush 5-10 mL  5-10 mL IntraVENous PRN    sodium chloride (NS) flush 5-40 mL  5-40 mL IntraVENous Q8H    sodium chloride (NS) flush 5-40 mL  5-40 mL IntraVENous PRN    acetaminophen (TYLENOL) tablet 650 mg  650 mg Oral Q6H PRN    Or    acetaminophen (TYLENOL) suppository 650 mg  650 mg Rectal Q6H PRN    polyethylene glycol (MIRALAX) packet 17 g  17 g Oral DAILY PRN    ondansetron (ZOFRAN ODT) tablet 4 mg  4 mg Oral Q8H PRN    Or    ondansetron (ZOFRAN) injection 4 mg  4 mg IntraVENous Q6H PRN    guaiFENesin-dextromethorphan (ROBITUSSIN DM) 100-10 mg/5 mL syrup 5 mL  5 mL Oral Q4H PRN    dexamethasone (DECADRON) 10 mg/mL injection 6 mg  6 mg IntraVENous Q24H    cholecalciferol (VITAMIN D3) (1000 Units /25 mcg) tablet 2,000 Units  2,000 Units Oral DAILY    alum-mag hydroxide-simeth (MYLANTA) oral suspension 15 mL  15 mL Oral Q6H PRN    albuterol (PROVENTIL HFA, VENTOLIN HFA, PROAIR HFA) inhaler 2 Puff  2 Puff Inhalation Q4H PRN    atorvastatin (LIPITOR) tablet 40 mg  40 mg Oral QHS    gabapentin (NEURONTIN) capsule 300 mg  300 mg Oral TID PRN    temazepam (RESTORIL) capsule 15 mg  15 mg Oral QHS PRN       Signed:  Sasha Rios MD    Part of this note may have been written by using a voice dictation software. The note has been proof read but may still contain some grammatical/other typographical errors.

## 2022-01-30 NOTE — PROGRESS NOTES
BSR received from [facility] patient resting in bed  RR even/unlabored on heated high flow 60L/100% with NAD noted at this time  bed in lowest position wheels locked call light within reach  patient instructed to call for assistance when needed with understanding verbalized

## 2022-01-31 LAB
GLUCOSE BLD STRIP.AUTO-MCNC: 118 MG/DL (ref 65–100)
GLUCOSE BLD STRIP.AUTO-MCNC: 80 MG/DL (ref 65–100)
SERVICE CMNT-IMP: ABNORMAL
SERVICE CMNT-IMP: NORMAL

## 2022-01-31 PROCEDURE — 74011250637 HC RX REV CODE- 250/637: Performed by: STUDENT IN AN ORGANIZED HEALTH CARE EDUCATION/TRAINING PROGRAM

## 2022-01-31 PROCEDURE — 99232 SBSQ HOSP IP/OBS MODERATE 35: CPT | Performed by: INTERNAL MEDICINE

## 2022-01-31 PROCEDURE — 82962 GLUCOSE BLOOD TEST: CPT

## 2022-01-31 PROCEDURE — 74011250636 HC RX REV CODE- 250/636: Performed by: FAMILY MEDICINE

## 2022-01-31 PROCEDURE — 74011000250 HC RX REV CODE- 250: Performed by: FAMILY MEDICINE

## 2022-01-31 PROCEDURE — 74011000258 HC RX REV CODE- 258: Performed by: STUDENT IN AN ORGANIZED HEALTH CARE EDUCATION/TRAINING PROGRAM

## 2022-01-31 PROCEDURE — 74011000250 HC RX REV CODE- 250: Performed by: STUDENT IN AN ORGANIZED HEALTH CARE EDUCATION/TRAINING PROGRAM

## 2022-01-31 PROCEDURE — 97530 THERAPEUTIC ACTIVITIES: CPT

## 2022-01-31 PROCEDURE — 74011250637 HC RX REV CODE- 250/637: Performed by: FAMILY MEDICINE

## 2022-01-31 PROCEDURE — 94762 N-INVAS EAR/PLS OXIMTRY CONT: CPT

## 2022-01-31 PROCEDURE — 74011250636 HC RX REV CODE- 250/636: Performed by: STUDENT IN AN ORGANIZED HEALTH CARE EDUCATION/TRAINING PROGRAM

## 2022-01-31 PROCEDURE — 74011250637 HC RX REV CODE- 250/637: Performed by: NURSE PRACTITIONER

## 2022-01-31 PROCEDURE — 65660000000 HC RM CCU STEPDOWN

## 2022-01-31 PROCEDURE — 77010033711 HC HIGH FLOW OXYGEN

## 2022-01-31 RX ADMIN — SODIUM CHLORIDE, PRESERVATIVE FREE 10 ML: 5 INJECTION INTRAVENOUS at 21:10

## 2022-01-31 RX ADMIN — CEFEPIME HYDROCHLORIDE 2 G: 2 INJECTION, POWDER, FOR SOLUTION INTRAVENOUS at 08:21

## 2022-01-31 RX ADMIN — APIXABAN 5 MG: 5 TABLET, FILM COATED ORAL at 21:10

## 2022-01-31 RX ADMIN — BARICITINIB 2 MG: 2 TABLET, FILM COATED ORAL at 08:23

## 2022-01-31 RX ADMIN — METOPROLOL TARTRATE 50 MG: 50 TABLET, FILM COATED ORAL at 21:10

## 2022-01-31 RX ADMIN — SODIUM CHLORIDE, PRESERVATIVE FREE 5 ML: 5 INJECTION INTRAVENOUS at 05:54

## 2022-01-31 RX ADMIN — APIXABAN 5 MG: 5 TABLET, FILM COATED ORAL at 08:23

## 2022-01-31 RX ADMIN — TEMAZEPAM 15 MG: 15 CAPSULE ORAL at 21:10

## 2022-01-31 RX ADMIN — VITAMIN D, TAB 1000IU (100/BT) 2000 UNITS: 25 TAB at 08:23

## 2022-01-31 RX ADMIN — SODIUM CHLORIDE, PRESERVATIVE FREE 10 ML: 5 INJECTION INTRAVENOUS at 13:45

## 2022-01-31 RX ADMIN — DEXLANSOPRAZOLE 60 MG: 60 CAPSULE, DELAYED RELEASE ORAL at 08:25

## 2022-01-31 RX ADMIN — ATORVASTATIN CALCIUM 40 MG: 40 TABLET, FILM COATED ORAL at 21:10

## 2022-01-31 RX ADMIN — DEXAMETHASONE SODIUM PHOSPHATE 6 MG: 10 INJECTION, SOLUTION INTRAMUSCULAR; INTRAVENOUS at 08:23

## 2022-01-31 NOTE — PROGRESS NOTES
ACUTE PHYSICAL THERAPY GOALS:  (Developed with and agreed upon by patient and/or caregiver. )    LTG:  (1.)Ms. Fitzgerald will move from supine to sit and sit to supine , scoot up and down and roll side to side in flat bed without siderails with  INDEPENDENT within 7 day(s). (2.)Ms. Fitzgerald will perform all functional transfers with  MODIFIED INDEPENDENCE using the least restrictive/no device within 7 day(s). (3.)Ms. Fitzgerald will ambulate with  CONTACT GUARD ASSIST for 100+ feet with normal vital sign response with the least restrictive/no device within 7 day(s). PHYSICAL THERAPY: Daily Note and PM Treatment Day # 3    Homer Joseph is a 80 y.o. female   PRIMARY DIAGNOSIS: Acute hypoxemic respiratory failure due to COVID-19 [facility])  [facility] hypox[facility] [U07.1, J96.01]         ASSESSMENT:     REHAB RECOMMENDATIONS: CURRENT LEVEL OF FUNCTION:  (Most Recently Demonstrated)   Recommendation to date pending progress:  [address] [place] [address] Therapy  Equipment:    To Be Determined Bed Mobility:   Contact Guard Assistance  Sit to Stand:   Contact Guard Assistance  Transfers:   Minimal Assistance  Gait/Mobility:   Contact Guard Assistance     ASSESSMENT:  Ms. Celio Damian was supine and agreeable to work with therapy. She sat up then stood to RW. She transferred to [facility]. Stood amb 5' forward then back x 2. Sat in chair and performed B LE ex. SUBJECTIVE:   Ms. Celio Damian states \"I've been laying here trying to think of how I was going to get out of here tomorrow\". SOCIAL HISTORY/ LIVING ENVIRONMENT: Ms. Celio Damian lives with her , son, and DIL. She ambulates independently and is primarily homebound.   Support Systems: Spouse/Significant Other,Child(sky)  OBJECTIVE:     PAIN: VITAL SIGNS: LINES/DRAINS:   Pre Treatment:    Post Treatment:    Continuous Pulse Oximetry  O2 Device: Heated,Hi flow nasal cannula       MOBILITY: I Mod I S SBA CGA Min Mod Max Total  NT x2 Comments:   Bed Mobility    Rolling [] [] [] [] [] [] [] [] [] [x] []    Supine to Sit [] [] [] [] [x] [] [] [] [] [] []    Scooting [] [] [] [] [x] [] [] [] [] [] []    Sit to Supine [] [] [] [] [x] [] [] [] [] [x] []    Transfers    Sit to Stand [] [] [] [] [x] [x] [] [] [] [] []    Bed to Chair [] [] [] [] [] [] [] [] [] [] []    Stand to Sit [] [] [] [] [] [x] [] [] [] [] []    I=Independent, Mod I=Modified Independent, S=Supervision, SBA=Standby Assistance, CGA=Contact Guard Assistance,   Min=Minimal Assistance, Mod=Moderate Assistance, Max=Maximal Assistance, Total=Total Assistance, NT=Not Tested    BALANCE: Good Fair+ Fair Fair- Poor NT Comments   Sitting Static [] [x] [] [] [] []    Sitting Dynamic [] [x] [] [] [] []              Standing Static [] [x] [] [] [] []    Standing Dynamic [] [] [x] [] [] []      GAIT: I Mod I S SBA CGA Min Mod Max Total  NT x2 Comments:   Level of Assistance [] [] [] [] [] [] [] [] [] [x] []    Distance 5' forward and 5' back    DME Rolling Walker    Gait Quality     Weightbearing  Status N/A     I=Independent, Mod I=Modified Independent, S=Supervision, SBA=Standby Assistance, CGA=Contact Guard Assistance,   Min=Minimal Assistance, Mod=Moderate Assistance, Max=Maximal Assistance, Total=Total Assistance, NT=Not Tested    PLAN:   FREQUENCY/DURATION: PT Plan of Care: 3 times/week for duration of hospital stay or until stated goals are met, whichever comes first.  TREATMENT:     TREATMENT:   ($$ Therapeutic Activity: 23-37 mins    )  Therapeutic Activity (24 Minutes): Therapeutic activity included Supine to Sit, Sit to Supine, Scooting, Ambulation on level ground, Sitting balance , Standing balance and LE ex to improve functional Mobility, Strength and Activity tolerance.     TREATMENT GRID:  N/A    AFTER TREATMENT POSITION/PRECAUTIONS:  Chair, Needs within reach and RN notified    INTERDISCIPLINARY COLLABORATION:  RN/PCT    TOTAL TREATMENT DURATION:  PT Patient Time In/Time Out  Time In: 7692  Time Out: [address] Fl[place]io, PTA

## 2022-01-31 NOTE — PROGRESS NOTES
Patient in bed resting. Respirations even and unlabored. On airvo 50L/94%. No needs expressed at this time. Safety measures in place. Call light in reach. No signs of acute distress at this time. Will continue to monitor. Preparing to give report to oncoming RN.

## 2022-01-31 NOTE — PROGRESS NOTES
Pt resting in bed with eyes closed. No s/sx of distress.  airvo in place at 50L 85% sat 96%, will monitor

## 2022-01-31 NOTE — PROGRESS NOTES
Hospitalist Progress Note   Admit Date:  2022 12:32 PM   Name:  Rudy Styles   Age:  80 y.o. Sex:  female  :  1935   MRN:  [id number]   Room:  [facility]    Presenting Complaint: Positive For Covid-19    Reason(s) for Admission: Acute hypoxemic respiratory failure due to COVID-19 [facility]) [U07.1, J96.01]     Hospital Course & Interval History:   Rudy Styles is a 80 y.o. female with medical history of HTN, mixed HLD, GERD, Glaucoma, Hay fever,  HLD, insomnia who presented from home via EMS with hypoxia and AMS with recent diagnosis of COVID-19. EMS reports patient was hypoxic on arrival, O2 saturation in the 60s.  Placed on 6 L and given terbutaline.  EMS also noted family some concern for patient being more altered than normal.     In ED, T102 BP 91/56    spo2 74% RA, 85% 6L NC, 91% 55L/m  LA 3.9 CRP 17.3 D dimer 3.85 NT pro BNP 3481 HS trop 0648>8478   CXR BL infiltrates   rec'd Tylenol 1000 mg and decadron 10 mg IV      EKG shows sinus tachycardia, rate 115, , QRS 92, QTc 491     She is alert and oriented to person, place  but not month or year. Unable to get in touch with either son or . She notes symptom onset 5 days ago. Patient denies CP or palpitations, nausea, vomiting, diarrhea, fevers. +chills, +cough. Says  is sick. Discussed Actemra with patient including risks and benefits.      satting 94% on 55/90% airvo. Subjective (22): Patient seen and examined and continues to complain of shortness of breath and  denies fever chills. Assessment & Plan:     Acute Hypoxemic Respiratory Failure 2/2 COVID-19 PNA   Suspected Cytokine Storm with elevated markers   - COVID +  ,  Unvaccinated   - Decadron 6 mg x 10 doses   -Continue Bacitricininb  - Charted O2 Flow Rate (L/min): 50 l/min.         New onset Afib  EKG showed A. fib with RVR with HR 170s  Troponins were elevated and down trended  Lilian vasc score of 4 and stroke risk of 4.8% per yr  Continue metoprolol  Continue Eliquis   echo with normal EF   Cardiology signed off  -    Sepsis 2/2 COVID -   Resolved   admin 30 cc/kg bolus. 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 Pro calcitonin elevated 1.15  Ct chest showed covid and no PE       Troponemia  Cardiac enzymes downtrending. EKG does not demonstrate ST segment elevation. Cardiology consulted Hypercoagulable state associated with COVID-19 -      Hypokalemia // Hypomagnesemia - associated with prolonged QTc   Resolved    Acute Encephalopathy -  Resolved       Chronic insomnia - takes temazepam 30 mg nightly for this. At risk of withdrawal. Reduce dose to 15 mg as needed.      Lumbar spondylosis - f/b pain management. Takes gabapentin 800 mg BID, tramadol 100 mg BID.  Reduce gabapentin 300 mg TID prn, hold tramadol for now.        HLD - continue  atorvastatin      H/o HTN - Normotensive Monitor      GERD - resume protonix.      There is a high probability of acute organ impairment or life-threatening deterioration in the patient's condition from: Sepsis, acute respiratory failure on HFNC, VTE, severe electrolyte abnormalities     Diet:  ADULT DIET Easy to Chew; Low Fat/Low Chol/High Fiber/EVA  DVT PPx:  Code status: Full Code    Hospital Problems as of 1/31/2022 Date Reviewed: 4/12/2018          Codes Class Noted - Resolved POA    * (Principal) Acute hypoxemic respiratory failure due to COVID-19 [facility]) ICD-10-CM: U07.1, J96.01  ICD-9-CM: 518.81, 079.89, 799.02  1/26/2022 - Present Yes        Sepsis due to COVID-19 [facility]) ICD-10-CM: U07.1, A41.89  ICD-9-CM: 079.89, 995.91  1/26/2022 - Present Yes        Hypomagnesemia ICD-10-CM: K59.27  ICD-9-CM: 275.2  1/26/2022 - Present Yes        Hypokalemia ICD-10-CM: E87.6  ICD-9-CM: 276.8  1/26/2022 - Present Yes        Prolonged Q-T interval on ECG ICD-10-CM: R94.31  ICD-9-CM: 794.31  1/26/2022 - Present Yes        Elevated troponin ICD-10-CM: R77.8  ICD-9-CM: 790.6  1/26/2022 - Present Yes        Toxic metabolic encephalopathy ZNV-27-IK: G92.8  ICD-9-CM: 349.82  1/26/2022 - Present Yes        Chronic prescription benzodiazepine use ICD-10-CM: Z79.899  ICD-9-CM: V58.69  1/26/2022 - Present Yes        Hypercoagulable state associated with COVID-19 [facility]) ICD-10-CM: U07.1, D68.69  ICD-9-CM: 289.82, 079.89  1/26/2022 - Present Yes        Transaminitis ICD-10-CM: R74.01  ICD-9-CM: 790.4  1/26/2022 - Present Yes        Gastroesophageal reflux disease without esophagitis ICD-10-CM: K21.9  ICD-9-CM: 530.81  9/28/2017 - Present Yes    Overview Signed 1/9/2018 10:12 AM by Nini Lucio DO     Last Assessment & Plan:   Stable. Essential (primary) hypertension ICD-10-CM: I10  ICD-9-CM: 401.9  10/28/2016 - Present Yes    Overview Signed 1/9/2018 10:12 AM by Nini Lucio DO     Last Assessment & Plan:   Stable. Hyperlipidemia ICD-10-CM: E78.5  ICD-9-CM: 272.4  10/28/2016 - Present Yes    Overview Signed 1/9/2018 10:12 AM by Nini Lucio DO     Last Assessment & Plan:   Return for fasting labs. Insomnia, persistent ICD-10-CM: G47.00  ICD-9-CM: 307.42  4/27/2016 - Present Yes    Overview Signed 1/9/2018 10:12 AM by Nini Lucio DO     Last Assessment & Plan:   Restoril prescription printed & given. Primary osteoarthritis involving multiple joints ICD-10-CM: M89.49  ICD-9-CM: 715.98  7/31/2015 - Present Yes    Overview Signed 1/9/2018 10:12 AM by Nini Lucio DO     Last Assessment & Plan:   3 Norco scripts printed with do not fill before dates of 10/10, 11/9, 12/9. Tramadol prescription printed & given (decreased from 150 to 120 tablets per month based on how patient is taking it). Aware of risk of sedation and aware to get prescriptions only in our office. Discussed polypharmacy & fall risk with patient's multiple sedating medications, encouraged to limit use as much as possible.  Will have her see Dr. Neo Winter for next follow up to review pain medication regimen. Pt verbalized understanding, agreed with plan. Objective:     Patient Vitals for the past 24 hrs:   Temp Pulse Resp BP SpO2   01/31/22 1456 98.3 °F (36.8 °C) 66 18 126/73 99 %   01/31/22 1037 98.5 °F (36.9 °C) 66 20 104/65 98 %   01/31/22 0904 98.3 °F (36.8 °C) -- -- -- --   01/31/22 0804 -- -- -- -- 94 %   01/31/22 0719 -- (!) 54 22 127/66 93 %   01/31/22 0320 97.8 °F (36.6 °C) (!) 57 19 (!) 154/69 90 %   01/30/22 2332 97.5 °F (36.4 °C) (!) 54 20 135/71 98 %   01/30/22 1942 97.7 °F (36.5 °C) 60 20 120/76 98 %     Oxygen Therapy  O2 Sat (%): 99 % (01/31/22 1456)  Pulse via Oximetry: 60 beats per minute (01/31/22 0804)  O2 Device: Heated; Hi flow nasal cannula (01/31/22 0804)  Skin Assessment: Clean, dry, & intact (01/28/22 2046)  O2 Flow Rate (L/min): 50 l/min (01/31/22 0804)  O2 Temperature: 87.8 °F (31 °C) (01/28/22 2046)  FIO2 (%): 90 % (01/31/22 0804)    Estimated body mass index is 27.78 kg/m² as calculated from the following:    Height as of this encounter: 5' 1\" (1.549 m). Weight as of this encounter: 66.7 kg (147 lb). Intake/Output Summary (Last 24 hours) at 1/31/2022 1704  Last data filed at 1/31/2022 1702  Gross per 24 hour   Intake 480 ml   Output 200 ml   Net 280 ml         Physical Exam:     Blood pressure 126/73, pulse 66, temperature 98.3 °F (36.8 °C), resp. rate 18, height 5' 1\" (1.549 m), weight 66.7 kg (147 lb), SpO2 99 %. General:    Well nourished. No overt distress  Head:  Normocephalic, atraumatic  Eyes:  Sclerae appear normal.  Pupils equally round. ENT:  Nares appear normal, no drainage. Moist oral mucosa  Neck:  No restricted ROM. Trachea midline   CV:   RRR. No m/r/g. No jugular venous distension. Lungs:   Wheezing bilaterally  Abdomen: Bowel sounds present. Soft, nontender, nondistended. Extremities: No cyanosis or clubbing. No edema  Skin:     No rashes and normal coloration. Warm and dry. Neuro:  CN II-XII grossly intact. Sensation intact. A&Ox3  Psych:  Normal mood and affect. I have reviewed ordered lab tests and independently visualized imaging below:    Recent Labs:  Recent Results (from the past 48 hour(s))   GLUCOSE, POC    Collection Time: 01/29/22  5:09 PM   Result Value Ref Range    Glucose (POC) 170 (H) 65 - 100 mg/dL    Performed by [facility]    METABOLIC PANEL, BASIC    Collection Time: 01/30/22  3:44 AM   Result Value Ref Range    Sodium 143 136 - 145 mmol/L    Potassium 4.1 3.5 - 5.1 mmol/L    Chloride 108 (H) 98 - 107 mmol/L    CO2 29 21 - 32 mmol/L    Anion gap 6 (L) 7 - 16 mmol/L    Glucose 90 65 - 100 mg/dL    BUN 39 (H) 8 - 23 MG/DL    Creatinine 0.64 0.6 - 1.0 MG/DL    GFR est AA >60 >60 ml/min/1.73m2    GFR est non-AA >60 >60 ml/min/1.73m2    Calcium 8.4 8.3 - 10.4 MG/DL   CBC WITH AUTOMATED DIFF    Collection Time: 01/30/22  3:44 AM   Result Value Ref Range    WBC 9.3 4.3 - 11.1 K/uL    RBC 3.88 (L) 4.05 - 5.2 M/uL    HGB 11.0 (L) 11.7 - 15.4 g/dL    HCT 34.3 (L) 35.8 - 46.3 %    MCV 88.4 79.6 - 97.8 FL    MCH 28.4 26.1 - 32.9 PG    MCHC 32.1 31.4 - 35.0 g/dL    RDW 13.7 11.9 - 14.6 %    PLATELET 482 (H) 554 - 450 K/uL    MPV 10.2 9.4 - 12.3 FL    ABSOLUTE NRBC 0.00 0.0 - 0.2 K/uL    DF AUTOMATED      NEUTROPHILS 76 43 - 78 %    LYMPHOCYTES 15 13 - 44 %    MONOCYTES 8 4.0 - 12.0 %    EOSINOPHILS 0 (L) 0.5 - 7.8 %    BASOPHILS 0 0.0 - 2.0 %    IMMATURE GRANULOCYTES 1 0.0 - 5.0 %    ABS. NEUTROPHILS 7.0 1.7 - 8.2 K/UL    ABS. LYMPHOCYTES 1.4 0.5 - 4.6 K/UL    ABS. MONOCYTES 0.7 0.1 - 1.3 K/UL    ABS. EOSINOPHILS 0.0 0.0 - 0.8 K/UL    ABS. BASOPHILS 0.0 0.0 - 0.2 K/UL    ABS. IMM.  GRANS. 0.1 0.0 - 0.5 K/UL   MAGNESIUM    Collection Time: 01/30/22  3:44 AM   Result Value Ref Range    Magnesium 2.2 1.8 - 2.4 mg/dL   PHOSPHORUS    Collection Time: 01/30/22  3:44 AM   Result Value Ref Range    Phosphorus 3.8 (H) 2.3 - 3.7 MG/DL   GLUCOSE, POC    Collection Time: 01/30/22  7:45 AM   Result Value Ref Range    Glucose (POC) 83 65 - 100 mg/dL    Performed by Manda    GLUCOSE, POC    Collection Time: 01/30/22 11:18 AM   Result Value Ref Range    Glucose (POC) 96 65 - 100 mg/dL    Performed by [facility], POC    Collection Time: 01/30/22  4:16 PM   Result Value Ref Range    Glucose (POC) 170 (H) 65 - 100 mg/dL    Performed by Steve    GLUCOSE, POC    Collection Time: 01/31/22  8:01 AM   Result Value Ref Range    Glucose (POC) 80 65 - 100 mg/dL    Performed by Diego    GLUCOSE, POC    Collection Time: 01/31/22 11:16 AM   Result Value Ref Range    Glucose (POC) 118 (H) 65 - 100 mg/dL    Performed by MicahitaVANESSAT        All Micro Results     Procedure Component Value Units Date/Time    CULTURE, URINE [[id number]]  (Abnormal)  (Susceptibility) Collected: 01/28/22 1105    Order Status: Completed Specimen: Urine from Clean catch Updated: 01/30/22 0843     Special Requests: NO SPECIAL REQUESTS        Culture result:       >100,000 COLONIES/mL ESCHERICHIA COLI                  <1,000 CFU/ML MIXED SKIN SCOTT ISOLATED          COVID-19 RAPID TEST [[id number]]  (Abnormal) Collected: 01/26/22 1258    Order Status: Completed Specimen: Nasopharyngeal Updated: 01/26/22 1316     Specimen source NASAL        COVID-19 rapid test Detected        Comment:      The specimen is POSITIVE for SARS-CoV-2, the novel coronavirus associated with COVID-19. This test has been authorized by the FDA under an Emergency Use Authorization (EUA) for use by authorized laboratories. Fact sheet for Healthcare Providers: ConventionUpdate.co.nz  Fact sheet for Patients: ConventionUpdate.co.nz       Methodology: Isothermal Nucleic Acid Amplification               Other Studies:  No results found.     Current Meds:  Current Facility-Administered Medications   Medication Dose Route Frequency    [START ON 2/1/2022] cefTRIAXone (ROCEPHIN) 1 g in 0.9% sodium chloride (MBP/ADV) 50 mL MBP  1 g IntraVENous Q24H    apixaban (ELIQUIS) tablet 5 mg  5 mg Oral BID    metoprolol tartrate (LOPRESSOR) tablet 50 mg  50 mg Oral Q12H    traMADoL (ULTRAM) tablet 100 mg  100 mg Oral Q8H PRN    baricitinib (OLUMIANT) tablet 2 mg  2 mg Oral DAILY    dexlansoprazole (DEXILANT) capsule (delayed release) CpDB 60 mg (Patient Supplied)  60 mg Oral DAILY    sodium chloride (NS) flush 5-10 mL  5-10 mL IntraVENous Q8H    sodium chloride (NS) flush 5-10 mL  5-10 mL IntraVENous PRN    sodium chloride (NS) flush 5-40 mL  5-40 mL IntraVENous Q8H    sodium chloride (NS) flush 5-40 mL  5-40 mL IntraVENous PRN    acetaminophen (TYLENOL) tablet 650 mg  650 mg Oral Q6H PRN    Or    acetaminophen (TYLENOL) suppository 650 mg  650 mg Rectal Q6H PRN    polyethylene glycol (MIRALAX) packet 17 g  17 g Oral DAILY PRN    ondansetron (ZOFRAN ODT) tablet 4 mg  4 mg Oral Q8H PRN    Or    ondansetron (ZOFRAN) injection 4 mg  4 mg IntraVENous Q6H PRN    guaiFENesin-dextromethorphan (ROBITUSSIN DM) 100-10 mg/5 mL syrup 5 mL  5 mL Oral Q4H PRN    dexamethasone (DECADRON) 10 mg/mL injection 6 mg  6 mg IntraVENous Q24H    cholecalciferol (VITAMIN D3) (1000 Units /25 mcg) tablet 2,000 Units  2,000 Units Oral DAILY    alum-mag hydroxide-simeth (MYLANTA) oral suspension 15 mL  15 mL Oral Q6H PRN    albuterol (PROVENTIL HFA, VENTOLIN HFA, PROAIR HFA) inhaler 2 Puff  2 Puff Inhalation Q4H PRN    atorvastatin (LIPITOR) tablet 40 mg  40 mg Oral QHS    gabapentin (NEURONTIN) capsule 300 mg  300 mg Oral TID PRN    temazepam (RESTORIL) capsule 15 mg  15 mg Oral QHS PRN       Signed:  Ericka Eduardo MD    Part of this note may have been written by using a voice dictation software. The note has been proof read but may still contain some grammatical/other typographical errors.

## 2022-01-31 NOTE — PROGRESS NOTES
Problem: Falls - Risk of  Goal: *Absence of Falls  Description: Document Helena Skill Fall Risk and appropriate interventions in the flowsheet.   Outcome: Progressing Towards Goal  Note: Fall Risk Interventions:  Mobility Interventions: Patient to call before getting OOB         Medication Interventions: Evaluate medications/consider consulting pharmacy    Elimination Interventions: Call light in reach              Problem: Patient Education: Go to Patient Education Activity  Goal: Patient/Family Education  Outcome: Progressing Towards Goal

## 2022-01-31 NOTE — PROGRESS NOTES
Pt resting in bed with eyes closed. Pt awakens when spoken to. Pt on airvo at 50L 100%. No s/sx of distress noted at this time. Call light in reach, will monitor.

## 2022-01-31 NOTE — PROGRESS NOTES
[facility] CARDIOLOGY PROGRESS NOTE           1/31/2022 11:15 AM    Admit Date: 1/26/2022      Subjective:   No cp or inc sob. Objective:      Vitals:    01/30/22 1454 01/30/22 1942 01/30/22 2332 01/31/22 0320   BP: 104/70 120/76 135/71 (!) 154/69   Pulse: 60 60 (!) 54 (!) 57   Resp:  20 20 19   Temp: 97.7 °F (36.5 °C) 97.7 °F (36.5 °C) 97.5 °F (36.4 °C) 97.8 °F (36.6 °C)   SpO2: 98% 98% 98% 90%   Weight:       Height:           Physical Exam:  General-No Acute Distress, on O2  Neck- supple, no JVD  CV- regular rate and rhythm no MRG  Lung- clear bilaterally  Abd- soft, nontender, nondistended  Ext- no edema bilaterally. Skin- warm and dry    Data Review:   Recent Labs     01/30/22  0344 01/29/22  0352    144   K 4.1 4.1   MG 2.2  --    BUN 39* 41*   CREA 0.64 0.75   GLU 90 89   WBC 9.3 9.9   HGB 11.0* 11.2*   HCT 34.3* 35.9   * 401       Assessment/Plan:     Principal Problem:    Acute hypoxemic respiratory failure due to COVID-19 [facility]) (1/26/2022)        Active Problems:    Essential (primary) hypertension (10/28/2016)       PAF    /////    She has converted back to a sinus bradycardia. Continue metoprolol  ? Lovenox>>DOAC    Overall patient appears to be stable from a cardiac standpoint today and no acute interventions are planned or needed.   Please call if her clinical situation changes      Naz Block MD  1/31/2022 11:15 AM

## 2022-01-31 NOTE — PROGRESS NOTES
Report received from [place], [place]. Patient in bed resting. Respirations even and unlabored. On airvo 50L/94%. No needs expressed at this time. Safety measures in place. Call light in reach. No signs of acute distress at this time. Will continue to monitor.

## 2022-01-31 NOTE — PROGRESS NOTES
Pt sitting up in bed. Pt on airvo at 50L 100 % plus NRB sat 96% at this time. NRB removed. Pt upset and frustrated with being in the hospital. Pt complaints of generalized pain but will not rate pain scale and then says \"Im ok\" no s/sx of distress noted at this time. Pt encouraged to call light in reach, bed alarm on. Will monitor.

## 2022-02-01 LAB
ANION GAP SERPL CALC-SCNC: 3 MMOL/L (ref 7–16)
BASOPHILS # BLD: 0 K/UL (ref 0–0.2)
BASOPHILS NFR BLD: 0 % (ref 0–2)
BUN SERPL-MCNC: 30 MG/DL (ref 8–23)
CALCIUM SERPL-MCNC: 8.7 MG/DL (ref 8.3–10.4)
CHLORIDE SERPL-SCNC: 106 MMOL/L (ref 98–107)
CO2 SERPL-SCNC: 33 MMOL/L (ref 21–32)
CREAT SERPL-MCNC: 0.69 MG/DL (ref 0.6–1)
DIFFERENTIAL METHOD BLD: ABNORMAL
EOSINOPHIL # BLD: 0.1 K/UL (ref 0–0.8)
EOSINOPHIL NFR BLD: 1 % (ref 0.5–7.8)
ERYTHROCYTE [DISTWIDTH] IN BLOOD BY AUTOMATED COUNT: 13.3 % (ref 11.9–14.6)
GLUCOSE SERPL-MCNC: 93 MG/DL (ref 65–100)
HCT VFR BLD AUTO: 35.3 % (ref 35.8–46.3)
HGB BLD-MCNC: 11.3 G/DL (ref 11.7–15.4)
IMM GRANULOCYTES # BLD AUTO: 0.2 K/UL (ref 0–0.5)
IMM GRANULOCYTES NFR BLD AUTO: 2 % (ref 0–5)
LYMPHOCYTES # BLD: 1.3 K/UL (ref 0.5–4.6)
LYMPHOCYTES NFR BLD: 14 % (ref 13–44)
MCH RBC QN AUTO: 28.8 PG (ref 26.1–32.9)
MCHC RBC AUTO-ENTMCNC: 32 G/DL (ref 31.4–35)
MCV RBC AUTO: 90.1 FL (ref 79.6–97.8)
MONOCYTES # BLD: 1 K/UL (ref 0.1–1.3)
MONOCYTES NFR BLD: 10 % (ref 4–12)
NEUTS SEG # BLD: 7 K/UL (ref 1.7–8.2)
NEUTS SEG NFR BLD: 73 % (ref 43–78)
NRBC # BLD: 0 K/UL (ref 0–0.2)
PLATELET # BLD AUTO: 415 K/UL (ref 150–450)
PMV BLD AUTO: 10 FL (ref 9.4–12.3)
POTASSIUM SERPL-SCNC: 3.8 MMOL/L (ref 3.5–5.1)
RBC # BLD AUTO: 3.92 M/UL (ref 4.05–5.2)
SODIUM SERPL-SCNC: 142 MMOL/L (ref 136–145)
WBC # BLD AUTO: 9.6 K/UL (ref 4.3–11.1)

## 2022-02-01 PROCEDURE — 65660000000 HC RM CCU STEPDOWN

## 2022-02-01 PROCEDURE — 74011250637 HC RX REV CODE- 250/637: Performed by: FAMILY MEDICINE

## 2022-02-01 PROCEDURE — 80048 BASIC METABOLIC PNL TOTAL CA: CPT

## 2022-02-01 PROCEDURE — 74011000250 HC RX REV CODE- 250: Performed by: STUDENT IN AN ORGANIZED HEALTH CARE EDUCATION/TRAINING PROGRAM

## 2022-02-01 PROCEDURE — 74011250637 HC RX REV CODE- 250/637: Performed by: STUDENT IN AN ORGANIZED HEALTH CARE EDUCATION/TRAINING PROGRAM

## 2022-02-01 PROCEDURE — 74011000258 HC RX REV CODE- 258: Performed by: STUDENT IN AN ORGANIZED HEALTH CARE EDUCATION/TRAINING PROGRAM

## 2022-02-01 PROCEDURE — 74011000250 HC RX REV CODE- 250: Performed by: FAMILY MEDICINE

## 2022-02-01 PROCEDURE — 36415 COLL VENOUS BLD VENIPUNCTURE: CPT

## 2022-02-01 PROCEDURE — 85025 COMPLETE CBC W/AUTO DIFF WBC: CPT

## 2022-02-01 PROCEDURE — 74011250636 HC RX REV CODE- 250/636: Performed by: FAMILY MEDICINE

## 2022-02-01 PROCEDURE — 74011250636 HC RX REV CODE- 250/636: Performed by: STUDENT IN AN ORGANIZED HEALTH CARE EDUCATION/TRAINING PROGRAM

## 2022-02-01 RX ORDER — POTASSIUM CHLORIDE 20 MEQ/1
40 TABLET, EXTENDED RELEASE ORAL
Status: DISCONTINUED | OUTPATIENT
Start: 2022-02-01 | End: 2022-02-01

## 2022-02-01 RX ORDER — POTASSIUM CHLORIDE 20 MEQ/1
20 TABLET, EXTENDED RELEASE ORAL
Status: COMPLETED | OUTPATIENT
Start: 2022-02-01 | End: 2022-02-01

## 2022-02-01 RX ADMIN — APIXABAN 5 MG: 5 TABLET, FILM COATED ORAL at 21:02

## 2022-02-01 RX ADMIN — SODIUM CHLORIDE, PRESERVATIVE FREE 10 ML: 5 INJECTION INTRAVENOUS at 21:02

## 2022-02-01 RX ADMIN — APIXABAN 5 MG: 5 TABLET, FILM COATED ORAL at 08:30

## 2022-02-01 RX ADMIN — DEXLANSOPRAZOLE 60 MG: 60 CAPSULE, DELAYED RELEASE ORAL at 08:31

## 2022-02-01 RX ADMIN — CEFTRIAXONE 1 G: 1 INJECTION, POWDER, FOR SOLUTION INTRAMUSCULAR; INTRAVENOUS at 08:30

## 2022-02-01 RX ADMIN — VITAMIN D, TAB 1000IU (100/BT) 2000 UNITS: 25 TAB at 08:30

## 2022-02-01 RX ADMIN — SODIUM CHLORIDE, PRESERVATIVE FREE 10 ML: 5 INJECTION INTRAVENOUS at 05:39

## 2022-02-01 RX ADMIN — SODIUM CHLORIDE, PRESERVATIVE FREE 10 ML: 5 INJECTION INTRAVENOUS at 13:59

## 2022-02-01 RX ADMIN — Medication 1 EACH: at 21:21

## 2022-02-01 RX ADMIN — BARICITINIB 2 MG: 2 TABLET, FILM COATED ORAL at 08:30

## 2022-02-01 RX ADMIN — DEXAMETHASONE SODIUM PHOSPHATE 6 MG: 10 INJECTION, SOLUTION INTRAMUSCULAR; INTRAVENOUS at 08:30

## 2022-02-01 RX ADMIN — TEMAZEPAM 15 MG: 15 CAPSULE ORAL at 21:02

## 2022-02-01 RX ADMIN — POTASSIUM CHLORIDE 20 MEQ: 20 TABLET, EXTENDED RELEASE ORAL at 10:37

## 2022-02-01 RX ADMIN — ATORVASTATIN CALCIUM 40 MG: 40 TABLET, FILM COATED ORAL at 21:02

## 2022-02-01 NOTE — PROGRESS NOTES
Hospitalist Progress Note   Admit Date:  2022 12:32 PM   Name:  Erum Johnson   Age:  80 y.o. Sex:  female  :  1935   MRN:  [id number]   Room:  [facility]/    Presenting Complaint: Positive For Covid-19    Reason(s) for Admission: Acute hypoxemic respiratory failure due to COVID-19 [facility]) [U07.1, J96.01]     Hospital Course & Interval History:   Erum Johnson is a 80 y.o. female with medical history of HTN, mixed HLD, GERD, Glaucoma, Hay fever,  HLD, insomnia who presented from home via EMS with hypoxia and AMS with recent diagnosis of COVID-19. EMS reports patient was hypoxic on arrival, O2 saturation in the 60s.  Placed on 6 L and given terbutaline.  EMS also noted family some concern for patient being more altered than normal.     In ED, T102 BP 91/56    spo2 74% RA, 85% 6L NC, 91% 55L/m  LA 3.9 CRP 17.3 D dimer 3.85 NT pro BNP 3481 HS trop 5634>0312   CXR BL infiltrates   rec'd Tylenol 1000 mg and decadron 10 mg IV      EKG shows sinus tachycardia, rate 115, , QRS 92, QTc 491     She is alert and oriented to person, place  but not month or year. Unable to get in touch with either son or . She notes symptom onset 5 days ago. Patient denies CP or palpitations, nausea, vomiting, diarrhea, fevers. +chills, +cough. Says  is sick. Discussed Actemra with patient including risks and benefits.      satting 94% on 55/90% airvo. Subjective (22): Patient seen and examined and continues to complain of shortness of breath and  denies fever chills. Had a lengthy conversation with the patient. Patient states she wants to go home. Explained to her that she cannot go home on AIRVO. Patient is very adamant about it.      Assessment & Plan:     Acute Hypoxemic Respiratory Failure 2/2 COVID-19 PNA   Patient is saturating well on a low 50 L/MIN  Suspected Cytokine Storm with elevated markers   - COVID +  ,  Unvaccinated   - Decadron 6 mg x 10 doses   -Continue Bacitricininb  -      New onset Afib  EKG showed A. fib with RVR with HR 170s  Troponins were elevated and down trended  [place] vasc score of 4 and stroke risk of 4.8% per yr  Continue  metoprolol  Continue Eliquis   echo with normal EF   Cardiology signed off  -    Sepsis 2/2 COVID -   Resolved   admin 30 cc/kg bolus. 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 Pro calcitonin elevated 1.15  Ct chest showed covid and no PE       Troponemia  Cardiac enzymes downtrending. EKG does not demonstrate ST segment elevation. Cardiology consulted Hypercoagulable state associated with COVID-19 -      Hypokalemia // Hypomagnesemia - associated with prolonged QTc   Resolved    Acute Encephalopathy -  Resolved       Chronic insomnia - takes temazepam 30 mg nightly for this. At risk of withdrawal. Reduce dose to 15 mg as needed.      Lumbar spondylosis - f/b pain management. Takes gabapentin 800 mg BID, tramadol 100 mg BID. Reduce gabapentin 300 mg TID prn, hold tramadol for now.        HLD - continue  atorvastatin      H/o HTN - Normotensive Monitor      GERD - resume protonix. Communication spoke with the son over the phone and gave the update. He was saying that she was supposed to get some antibody pills.   I answered all his questions     There is a high probability of acute organ impairment or life-threatening deterioration in the patient's condition from: Sepsis, acute respiratory failure on HFNC, VTE, severe electrolyte abnormalities     Diet:  ADULT DIET Easy to Chew; Low Fat/Low Chol/High Fiber/EVA  DVT PPx: Eliquis  Code status: Full Code    Hospital Problems as of 2/1/2022 Date Reviewed: 4/12/2018          Codes Class Noted - Resolved POA    * (Principal) [facility] ICD-10-CM: U07.1, J96.01  ICD-9-CM: 518.81, 079.89, 799.02  1/26/2022 - Present Yes        [facility]) ICD-10-CM: U07.1, A41.89  ICD-9-CM: 079.89, 995.91  1/26/2022 - Present Yes        Hypomagnesemia ICD-10-CM: E83.42  ICD-9-CM: 275.2  1/26/2022 - Present Yes        Hypokalemia ICD-10-CM: E87.6  ICD-9-CM: 276.8  1/26/2022 - Present Yes        Prolonged Q-T interval on ECG ICD-10-CM: R94.31  ICD-9-CM: 794.31  1/26/2022 - Present Yes        Elevated troponin ICD-10-CM: R77.8  ICD-9-CM: 790.6  1/26/2022 - Present Yes        Toxic metabolic encephalopathy QL-79-FB: G92.8  ICD-9-CM: 349.82  1/26/2022 - Present Yes        Chronic prescription benzodiazepine use ICD-10-CM: Z79.899  ICD-9-CM: V58.69  1/26/2022 - Present Yes        Hypercoagulable state associated with COVID-19 [facility]) ICD-10-CM: U07.1, D68.69  ICD-9-CM: 289.82, 079.89  1/26/2022 - Present Yes        Transaminitis ICD-10-CM: R74.01  ICD-9-CM: 790.4  1/26/2022 - Present Yes        Gastroesophageal reflux disease without esophagitis ICD-10-CM: K21.9  ICD-9-CM: 530.81  9/28/2017 - Present Yes    Overview Signed 1/9/2018 10:12 AM by Manohar Arthur DO     Last Assessment & Plan:   Stable. Essential (primary) hypertension ICD-10-CM: I10  ICD-9-CM: 401.9  10/28/2016 - Present Yes    Overview Signed 1/9/2018 10:12 AM by Manohar Arthur DO     Last Assessment & Plan:   Stable. Hyperlipidemia ICD-10-CM: E78.5  ICD-9-CM: 272.4  10/28/2016 - Present Yes    Overview Signed 1/9/2018 10:12 AM by Manohar Arthur DO     Last Assessment & Plan:   Return for fasting labs. Insomnia, persistent ICD-10-CM: G47.00  ICD-9-CM: 307.42  4/27/2016 - Present Yes    Overview Signed 1/9/2018 10:12 AM by Manohar Arthur DO     Last Assessment & Plan:   Restoril prescription printed & given.              Primary osteoarthritis involving multiple joints ICD-10-CM: M89.49  ICD-9-CM: 715.98  7/31/2015 - Present Yes    Overview Signed 1/9/2018 10:12 AM by Manohar Arthur DO     Last Assessment & Plan:   3 Norco scripts printed with do not fill before dates of 10/10, 11/9, 12/9. Tramadol prescription printed & given (decreased from 150 to 120 tablets per month based on how patient is taking it). Aware of risk of sedation and aware to get prescriptions only in our office. Discussed polypharmacy & fall risk with patient's multiple sedating medications, encouraged to limit use as much as possible. Will have her see Dr. Jude Montgomery for next follow up to review pain medication regimen. Pt verbalized understanding, agreed with plan. Objective:     Patient Vitals for the past 24 hrs:   Temp Pulse Resp BP SpO2   02/01/22 1454 97.4 °F (36.3 °C) 60 18 103/65 98 %   02/01/22 1101 97.7 °F (36.5 °C) 63 20 96/68 96 %   02/01/22 0846 -- -- -- -- 99 %   02/01/22 0718 98.5 °F (36.9 °C) (!) 55 18 135/69 96 %   02/01/22 0235 97.6 °F (36.4 °C) (!) 51 16 118/75 95 %   01/31/22 2306 97.5 °F (36.4 °C) (!) 55 18 109/67 98 %   01/31/22 2130 -- -- -- -- 96 %   01/31/22 2037 97.5 °F (36.4 °C) 67 18 119/70 96 %     Oxygen Therapy  O2 Sat (%): 98 % (02/01/22 1454)  Pulse via Oximetry: 59 beats per minute (02/01/22 0846)  O2 Device: Heated; Hi flow nasal cannula (02/01/22 0846)  Skin Assessment: Clean, dry, & intact (02/01/22 0846)  O2 Flow Rate (L/min): 50 l/min (02/01/22 1425)  O2 Temperature: 87.8 °F (31 °C) (02/01/22 0846)  FIO2 (%): 90 % (02/01/22 1425)    Estimated body mass index is 27.78 kg/m² as calculated from the following:    Height as of this encounter: 5' 1\" (1.549 m). Weight as of this encounter: 66.7 kg (147 lb). Intake/Output Summary (Last 24 hours) at 2/1/2022 2156  Last data filed at 2/1/2022 0830  Gross per 24 hour   Intake 120 ml   Output --   Net 120 ml         Physical Exam:     Blood pressure 103/65, pulse 60, temperature 97.4 °F (36.3 °C), resp. rate 18, height 5' 1\" (1.549 m), weight 66.7 kg (147 lb), SpO2 98 %. General:    Well nourished.   No overt distress  Head:  Normocephalic, atraumatic  Eyes:  Sclerae appear normal.  Pupils equally round.  ENT:  Nares appear normal, no drainage. Moist oral mucosa  Neck:  No restricted ROM. Trachea midline   CV:   RRR. No m/r/g. No jugular venous distension. Lungs:   Wheezing bilaterally  Abdomen: Bowel sounds present. Soft, nontender, nondistended. Extremities: No cyanosis or clubbing. No edema  Skin:     No rashes and normal coloration. Warm and dry. Neuro:  CN II-XII grossly intact. Sensation intact. A&Ox3  Psych:  Normal mood and affect. I have reviewed ordered lab tests and independently visualized imaging below:    Recent Labs:  Recent Results (from the past 48 hour(s))   GLUCOSE, POC    Collection Time: 01/31/22  8:01 AM   Result Value Ref Range    Glucose (POC) 80 65 - 100 mg/dL    Performed by [email or web address]    GLUCOSE, POC    Collection Time: 01/31/22 11:16 AM   Result Value Ref Range    Glucose (POC) 118 (H) 65 - 100 mg/dL    Performed by [email or web address]    CBC WITH AUTOMATED DIFF    Collection Time: 02/01/22  3:21 AM   Result Value Ref Range    WBC 9.6 4.3 - 11.1 K/uL    RBC 3.92 (L) 4.05 - 5.2 M/uL    HGB 11.3 (L) 11.7 - 15.4 g/dL    HCT 35.3 (L) 35.8 - 46.3 %    MCV 90.1 79.6 - 97.8 FL    MCH 28.8 26.1 - 32.9 PG    MCHC 32.0 31.4 - 35.0 g/dL    RDW 13.3 11.9 - 14.6 %    PLATELET 063 838 - 647 K/uL    MPV 10.0 9.4 - 12.3 FL    ABSOLUTE NRBC 0.00 0.0 - 0.2 K/uL    DF AUTOMATED      NEUTROPHILS 73 43 - 78 %    LYMPHOCYTES 14 13 - 44 %    MONOCYTES 10 4.0 - 12.0 %    EOSINOPHILS 1 0.5 - 7.8 %    BASOPHILS 0 0.0 - 2.0 %    IMMATURE GRANULOCYTES 2 0.0 - 5.0 %    ABS. NEUTROPHILS 7.0 1.7 - 8.2 K/UL    ABS. LYMPHOCYTES 1.3 0.5 - 4.6 K/UL    ABS. MONOCYTES 1.0 0.1 - 1.3 K/UL    ABS. EOSINOPHILS 0.1 0.0 - 0.8 K/UL    ABS. BASOPHILS 0.0 0.0 - 0.2 K/UL    ABS. IMM.  GRANS. 0.2 0.0 - 0.5 K/UL   METABOLIC PANEL, BASIC    Collection Time: 02/01/22  3:21 AM   Result Value Ref Range    Sodium 142 136 - 145 mmol/L    Potassium 3.8 3.5 - 5.1 mmol/L    Chloride 106 98 - 107 mmol/L    CO2 33 (H) 21 - 32 mmol/L    Anion gap 3 (L) 7 - 16 mmol/L    Glucose 93 65 - 100 mg/dL    BUN 30 (H) 8 - 23 MG/DL    Creatinine 0.69 0.6 - 1.0 MG/DL    GFR est AA >60 >60 ml/min/1.73m2    GFR est non-AA >60 >60 ml/min/1.73m2    Calcium 8.7 8.3 - 10.4 MG/DL       All Micro Results     Procedure Component Value Units Date/Time    CULTURE, URINE [[id number]]  (Abnormal)  (Susceptibility) Collected: 01/28/22 1105    Order Status: Completed Specimen: Urine from Clean catch Updated: 01/30/22 0852     Special Requests: NO SPECIAL REQUESTS        Culture result:       >100,000 COLONIES/mL ESCHERICHIA COLI                  <1,000 CFU/ML MIXED SKIN SCOTT ISOLATED          COVID-19 RAPID TEST [[id number]]  (Abnormal) Collected: 01/26/22 1258    Order Status: Completed Specimen: Nasopharyngeal Updated: 01/26/22 1316     Specimen source NASAL        COVID-19 rapid test Detected        Comment:      The specimen is POSITIVE for SARS-CoV-2, the novel coronavirus associated with COVID-19. This test has been authorized by the FDA under an Emergency Use Authorization (EUA) for use by authorized laboratories. Fact sheet for Healthcare Providers: kstattoo.com  Fact sheet for Patients: kstattoo.com       Methodology: Isothermal Nucleic Acid Amplification               Other Studies:  No results found.     Current Meds:  Current Facility-Administered Medications   Medication Dose Route Frequency    [START ON 2/2/2022] baricitinib (OLUMIANT) tablet 4 mg  4 mg Oral DAILY    cefTRIAXone (ROCEPHIN) 1 g in 0.9% sodium chloride (MBP/ADV) 50 mL MBP  1 g IntraVENous Q24H    apixaban (ELIQUIS) tablet 5 mg  5 mg Oral BID    metoprolol tartrate (LOPRESSOR) tablet 50 mg  50 mg Oral Q12H    traMADoL (ULTRAM) tablet 100 mg  100 mg Oral Q8H PRN    dexlansoprazole (DEXILANT) capsule (delayed release) CpDB 60 mg (Patient Supplied)  60 mg Oral DAILY    sodium chloride (NS) flush 5-10 mL  5-10 mL IntraVENous Q8H    sodium chloride (NS) flush 5-10 mL  5-10 mL IntraVENous PRN    sodium chloride (NS) flush 5-40 mL  5-40 mL IntraVENous Q8H    sodium chloride (NS) flush 5-40 mL  5-40 mL IntraVENous PRN    acetaminophen (TYLENOL) tablet 650 mg  650 mg Oral Q6H PRN    Or    acetaminophen (TYLENOL) suppository 650 mg  650 mg Rectal Q6H PRN    polyethylene glycol (MIRALAX) packet 17 g  17 g Oral DAILY PRN    ondansetron (ZOFRAN ODT) tablet 4 mg  4 mg Oral Q8H PRN    Or    ondansetron (ZOFRAN) injection 4 mg  4 mg IntraVENous Q6H PRN    guaiFENesin-dextromethorphan (ROBITUSSIN DM) 100-10 mg/5 mL syrup 5 mL  5 mL Oral Q4H PRN    dexamethasone (DECADRON) 10 mg/mL injection 6 mg  6 mg IntraVENous Q24H    cholecalciferol (VITAMIN D3) (1000 Units /25 mcg) tablet 2,000 Units  2,000 Units Oral DAILY    alum-mag hydroxide-simeth (MYLANTA) oral suspension 15 mL  15 mL Oral Q6H PRN    albuterol (PROVENTIL HFA, VENTOLIN HFA, PROAIR HFA) inhaler 2 Puff  2 Puff Inhalation Q4H PRN    atorvastatin (LIPITOR) tablet 40 mg  40 mg Oral QHS    gabapentin (NEURONTIN) capsule 300 mg  300 mg Oral TID PRN    temazepam (RESTORIL) capsule 15 mg  15 mg Oral QHS PRN       Signed:  Bishop Joycelyn MD    Part of this note may have been written by using a voice dictation software. The note has been proof read but may still contain some grammatical/other typographical errors.

## 2022-02-01 NOTE — PROGRESS NOTES
Pt resting in bed with eyes closed. Pt awakens when spoken. Pt pleasant when awakened. Pt oriented time 3 at this time. Pt denies pain or distress. airvo in place at 60L 100% with sat 95%. Continuous pulse monitor in place. Pt encouraged to call for assistance if needed call light in reach, will monitor.

## 2022-02-01 NOTE — PROGRESS NOTES
Pt resting in bed. Pt denies pain or distress at this time. Pt on airvo at 50L 90% no distress noted at this time. Call light in reach, will monitor.

## 2022-02-01 NOTE — PROGRESS NOTES
Patient in bed sleeping. Respirations even and unlabored. On airvo 50L/90%. All needs addressed. Safety measures in place. Call light in reach. No signs of acute distress at this time. Will continue to monitor. Preparing to give report to oncoming RN.

## 2022-02-01 NOTE — PROGRESS NOTES
Report received from [address] [place] [address]. Patient in bed resting. Respirations even and unlabored. On airvo 50L/89%. No needs expressed at this time. Safety measures in place. Call light in reach. No signs of acute distress at this time. Will continue to monitor.

## 2022-02-02 LAB
ANION GAP SERPL CALC-SCNC: 4 MMOL/L (ref 7–16)
BASOPHILS # BLD: 0 K/UL (ref 0–0.2)
BASOPHILS NFR BLD: 0 % (ref 0–2)
BUN SERPL-MCNC: 26 MG/DL (ref 8–23)
CALCIUM SERPL-MCNC: 8.7 MG/DL (ref 8.3–10.4)
CHLORIDE SERPL-SCNC: 104 MMOL/L (ref 98–107)
CO2 SERPL-SCNC: 32 MMOL/L (ref 21–32)
CREAT SERPL-MCNC: 0.7 MG/DL (ref 0.6–1)
DIFFERENTIAL METHOD BLD: ABNORMAL
EOSINOPHIL # BLD: 0.1 K/UL (ref 0–0.8)
EOSINOPHIL NFR BLD: 1 % (ref 0.5–7.8)
ERYTHROCYTE [DISTWIDTH] IN BLOOD BY AUTOMATED COUNT: 13.1 % (ref 11.9–14.6)
GLUCOSE SERPL-MCNC: 101 MG/DL (ref 65–100)
HCT VFR BLD AUTO: 35.8 % (ref 35.8–46.3)
HGB BLD-MCNC: 11.6 G/DL (ref 11.7–15.4)
IMM GRANULOCYTES # BLD AUTO: 0.2 K/UL (ref 0–0.5)
IMM GRANULOCYTES NFR BLD AUTO: 3 % (ref 0–5)
LYMPHOCYTES # BLD: 1.2 K/UL (ref 0.5–4.6)
LYMPHOCYTES NFR BLD: 14 % (ref 13–44)
MCH RBC QN AUTO: 28.5 PG (ref 26.1–32.9)
MCHC RBC AUTO-ENTMCNC: 32.4 G/DL (ref 31.4–35)
MCV RBC AUTO: 88 FL (ref 79.6–97.8)
MONOCYTES # BLD: 1.1 K/UL (ref 0.1–1.3)
MONOCYTES NFR BLD: 12 % (ref 4–12)
NEUTS SEG # BLD: 6.4 K/UL (ref 1.7–8.2)
NEUTS SEG NFR BLD: 71 % (ref 43–78)
NRBC # BLD: 0 K/UL (ref 0–0.2)
PHOSPHATE SERPL-MCNC: 2.9 MG/DL (ref 2.3–3.7)
PLATELET # BLD AUTO: 388 K/UL (ref 150–450)
PMV BLD AUTO: 9.9 FL (ref 9.4–12.3)
POTASSIUM SERPL-SCNC: 3.7 MMOL/L (ref 3.5–5.1)
RBC # BLD AUTO: 4.07 M/UL (ref 4.05–5.2)
SODIUM SERPL-SCNC: 140 MMOL/L (ref 136–145)
WBC # BLD AUTO: 9 K/UL (ref 4.3–11.1)

## 2022-02-02 PROCEDURE — 83735 ASSAY OF MAGNESIUM: CPT

## 2022-02-02 PROCEDURE — 74011000250 HC RX REV CODE- 250: Performed by: FAMILY MEDICINE

## 2022-02-02 PROCEDURE — 97530 THERAPEUTIC ACTIVITIES: CPT

## 2022-02-02 PROCEDURE — 77010033711 HC HIGH FLOW OXYGEN

## 2022-02-02 PROCEDURE — 74011250637 HC RX REV CODE- 250/637: Performed by: STUDENT IN AN ORGANIZED HEALTH CARE EDUCATION/TRAINING PROGRAM

## 2022-02-02 PROCEDURE — 94762 N-INVAS EAR/PLS OXIMTRY CONT: CPT

## 2022-02-02 PROCEDURE — 74011250637 HC RX REV CODE- 250/637: Performed by: FAMILY MEDICINE

## 2022-02-02 PROCEDURE — 80048 BASIC METABOLIC PNL TOTAL CA: CPT

## 2022-02-02 PROCEDURE — 85025 COMPLETE CBC W/AUTO DIFF WBC: CPT

## 2022-02-02 PROCEDURE — 74011000250 HC RX REV CODE- 250: Performed by: STUDENT IN AN ORGANIZED HEALTH CARE EDUCATION/TRAINING PROGRAM

## 2022-02-02 PROCEDURE — 74011000258 HC RX REV CODE- 258: Performed by: STUDENT IN AN ORGANIZED HEALTH CARE EDUCATION/TRAINING PROGRAM

## 2022-02-02 PROCEDURE — 84100 ASSAY OF PHOSPHORUS: CPT

## 2022-02-02 PROCEDURE — 65660000000 HC RM CCU STEPDOWN

## 2022-02-02 PROCEDURE — 36415 COLL VENOUS BLD VENIPUNCTURE: CPT

## 2022-02-02 PROCEDURE — 74011250636 HC RX REV CODE- 250/636: Performed by: FAMILY MEDICINE

## 2022-02-02 PROCEDURE — 74011250636 HC RX REV CODE- 250/636: Performed by: STUDENT IN AN ORGANIZED HEALTH CARE EDUCATION/TRAINING PROGRAM

## 2022-02-02 RX ADMIN — SODIUM CHLORIDE, PRESERVATIVE FREE 10 ML: 5 INJECTION INTRAVENOUS at 05:54

## 2022-02-02 RX ADMIN — TEMAZEPAM 15 MG: 15 CAPSULE ORAL at 21:38

## 2022-02-02 RX ADMIN — VITAMIN D, TAB 1000IU (100/BT) 2000 UNITS: 25 TAB at 08:38

## 2022-02-02 RX ADMIN — BARICITINIB 4 MG: 2 TABLET, FILM COATED ORAL at 08:38

## 2022-02-02 RX ADMIN — SODIUM CHLORIDE, PRESERVATIVE FREE 5 ML: 5 INJECTION INTRAVENOUS at 21:39

## 2022-02-02 RX ADMIN — APIXABAN 5 MG: 5 TABLET, FILM COATED ORAL at 21:38

## 2022-02-02 RX ADMIN — APIXABAN 5 MG: 5 TABLET, FILM COATED ORAL at 08:38

## 2022-02-02 RX ADMIN — DEXAMETHASONE SODIUM PHOSPHATE 6 MG: 10 INJECTION, SOLUTION INTRAMUSCULAR; INTRAVENOUS at 08:38

## 2022-02-02 RX ADMIN — SODIUM CHLORIDE, PRESERVATIVE FREE 5 ML: 5 INJECTION INTRAVENOUS at 21:38

## 2022-02-02 RX ADMIN — SODIUM CHLORIDE, PRESERVATIVE FREE 5 ML: 5 INJECTION INTRAVENOUS at 14:25

## 2022-02-02 RX ADMIN — DEXLANSOPRAZOLE 60 MG: 60 CAPSULE, DELAYED RELEASE ORAL at 08:38

## 2022-02-02 RX ADMIN — ATORVASTATIN CALCIUM 40 MG: 40 TABLET, FILM COATED ORAL at 21:38

## 2022-02-02 RX ADMIN — CEFTRIAXONE 1 G: 1 INJECTION, POWDER, FOR SOLUTION INTRAMUSCULAR; INTRAVENOUS at 08:37

## 2022-02-02 NOTE — PROGRESS NOTES
Pt resting in bed. Pt tearful about situation about being in the hospital. No acute distress noted at this time. Pt on airvo at 50 L 90 no distress noted at this time. Call light in reach, will monitor.

## 2022-02-02 NOTE — PROGRESS NOTES
Pt sitting up in bed. Pt on airvo at 50L 100 % plus NRB sat 96% at this time. no s/sx of distress noted at this time. Pt encouraged to call light in reach, bed alarm on. Will monitor.

## 2022-02-02 NOTE — PROGRESS NOTES
Patient in bed resting. Respirations even and unlabored. On airvo 50L/90%. No needs expressed at this time. Safety measures in place. Call light in reach. No signs of acute distress. Will continue to monitor.

## 2022-02-02 NOTE — PROGRESS NOTES
Patient in bed resting. Respirations even and unlabored. On airvo 50L/100%. No needs expressed. Safety measures in place. Call light in reach. No signs of acute distress at this time. Will continue to monitor. Preparing to give report to oncoming RN.

## 2022-02-02 NOTE — PROGRESS NOTES
Pt resting with eyes closed. Pt awakens appropriately. No distress at this time. Pt on airvo at 50L 90% with sat 94%. Call light in reach, will monitor.

## 2022-02-02 NOTE — PROGRESS NOTES
Problem: Falls - Risk of  Goal: *Absence of Falls  Description: Document Noreen Fuentes Fall Risk and appropriate interventions in the flowsheet.   Outcome: Progressing Towards Goal  Note: Fall Risk Interventions:  Mobility Interventions: Patient to call before getting OOB         Medication Interventions: Evaluate medications/consider consulting pharmacy    Elimination Interventions: Call light in reach              Problem: Patient Education: Go to Patient Education Activity  Goal: Patient/Family Education  Outcome: Progressing Towards Goal

## 2022-02-02 NOTE — PROGRESS NOTES
Hospitalist Progress Note   Admit Date:  2022 12:32 PM   Name:  Ayla Joseph   Age:  80 y.o. Sex:  female  :  1935   MRN:  [id number]   Room:  [facility]    Presenting Complaint: Positive For Covid-19    Reason(s) for Admission: Acute hypoxemic respiratory failure due to COVID-19 [facility]) [U07.1, J96.01]     Hospital Course & Interval History:   Ayla Joseph is a 80 y.o. female with medical history of HTN, mixed HLD, GERD, Glaucoma, Hay fever,  HLD, insomnia who presented from home via EMS with hypoxia and AMS with recent diagnosis of COVID-19. EMS reports patient was hypoxic on arrival, O2 saturation in the 60s.  Placed on 6 L and given terbutaline.  EMS also noted family some concern for patient being more altered than normal.     In ED, T102 BP 91/56    spo2 74% RA, 85% 6L NC, 91% 55L/m  LA 3.9 CRP 17.3 D dimer 3.85 NT pro BNP 3481 HS trop 9183>5765   CXR BL infiltrates   rec'd Tylenol 1000 mg and decadron 10 mg IV      EKG shows sinus tachycardia, rate 115, , QRS 92, QTc 491     She is alert and oriented to person, place  but not month or year. Unable to get in touch with either son or . She notes symptom onset 5 days ago. Patient denies CP or palpitations, nausea, vomiting, diarrhea, fevers. +chills, +cough. Says  is sick. Discussed Actemra with patient including risks and benefits.      satting 94% on 55/90% airvo. Subjective (22):   Anxious to go home  On airvo 50l    Assessment & Plan:     Acute Hypoxemic Respiratory Failure 2/2 COVID-19 PNA   Patient is saturating well on a low 50 L/MIN  Suspected Cytokine Storm with elevated markers   - COVID +  ,  Unvaccinated   - Decadron 6 mg x 10 doses   -Continue Bacitricininb  -spoke to patient's son over phone      New onset Afib  EKG showed A. fib with RVR with HR 170s  Troponins were elevated and down trended  Ctra. Bailén-Motril 84 vasc score of 4 and stroke risk of 4.8% per yr  Continue  metoprolol  Continue Eliquis   echo with normal EF   Cardiology signed off  -    Sepsis 2/2 COVID -   Resolved   admin 30 cc/kg bolus. 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 Pro calcitonin elevated 1.15  Ct chest showed covid and no PE       Troponemia  Cardiac enzymes downtrending. EKG does not demonstrate ST segment elevation. Cardiology consulted Hypercoagulable state associated with COVID-19 -      Hypokalemia // Hypomagnesemia - associated with prolonged QTc   Resolved    Acute Encephalopathy -  Resolved       Chronic insomnia - takes temazepam 30 mg nightly for this. At risk of withdrawal. Reduce dose to 15 mg as needed.      Lumbar spondylosis - f/b pain management. Takes gabapentin 800 mg BID, tramadol 100 mg BID. Reduce gabapentin 300 mg TID prn, hold tramadol for now.        HLD - continue  atorvastatin      H/o HTN - Normotensive Monitor      GERD - resume protonix. Communication spoke with the son over the phone and gave the update. He was saying that she was supposed to get some antibody pills.   I answered all his questions     There is a high probability of acute organ impairment or life-threatening deterioration in the patient's condition from: Sepsis, acute respiratory failure on HFNC, VTE, severe electrolyte abnormalities     Diet:  ADULT DIET Easy to Chew; Low Fat/Low Chol/High Fiber/EVA  DVT PPx: Eliquis  Code status: Full Code    Hospital Problems as of 2/2/2022 Date Reviewed: 4/12/2018          Codes Class Noted - Resolved POA    * (Principal) Acute hypoxemic respiratory failure due to COVID-19 [facility]) ICD-10-CM: U07.1, J96.01  ICD-9-CM: 518.81, 079.89, 799.02  1/26/2022 - Present Yes        Sepsis due to COVID-19 [facility]) ICD-10-CM: U07.1, A41.89  ICD-9-CM: 079.89, 995.91  1/26/2022 - Present Yes        Hypomagnesemia ICD-10-CM: E54.58  ICD-9-CM: 275.2  1/26/2022 - Present Yes        Hypokalemia ICD-10-CM: E87.6  ICD-9-CM: 276.8  1/26/2022 - Present Yes Prolonged Q-T interval on ECG ICD-10-CM: R94.31  ICD-9-CM: 794.31  1/26/2022 - Present Yes        Elevated troponin ICD-10-CM: R77.8  ICD-9-CM: 790.6  1/26/2022 - Present Yes        Toxic metabolic encephalopathy IMB-96-ZQ: G92.8  ICD-9-CM: 349.82  1/26/2022 - Present Yes        Chronic prescription benzodiazepine use ICD-10-CM: Z79.899  ICD-9-CM: V58.69  1/26/2022 - Present Yes        Hypercoagulable state associated with COVID-19 [facility]) ICD-10-CM: U07.1, D68.69  ICD-9-CM: 289.82, 079.89  1/26/2022 - Present Yes        Transaminitis ICD-10-CM: R74.01  ICD-9-CM: 790.4  1/26/2022 - Present Yes        Gastroesophageal reflux disease without esophagitis ICD-10-CM: K21.9  ICD-9-CM: 530.81  9/28/2017 - Present Yes    Overview Signed 1/9/2018 10:12 AM by Siobhan Disla DO     Last Assessment & Plan:   Stable. Essential (primary) hypertension ICD-10-CM: I10  ICD-9-CM: 401.9  10/28/2016 - Present Yes    Overview Signed 1/9/2018 10:12 AM by Siobhan Disla DO     Last Assessment & Plan:   Stable. Hyperlipidemia ICD-10-CM: E78.5  ICD-9-CM: 272.4  10/28/2016 - Present Yes    Overview Signed 1/9/2018 10:12 AM by Siobhan Disla DO     Last Assessment & Plan:   Return for fasting labs. Insomnia, persistent ICD-10-CM: G47.00  ICD-9-CM: 307.42  4/27/2016 - Present Yes    Overview Signed 1/9/2018 10:12 AM by Siobhan Disla DO     Last Assessment & Plan:   Restoril prescription printed & given. Primary osteoarthritis involving multiple joints ICD-10-CM: M89.49  ICD-9-CM: 715.98  7/31/2015 - Present Yes    Overview Signed 1/9/2018 10:12 AM by Siobhan Disla DO     Last Assessment & Plan:   3 Norco scripts printed with do not fill before dates of 10/10, 11/9, 12/9. Tramadol prescription printed & given (decreased from 150 to 120 tablets per month based on how patient is taking it). Aware of risk of sedation and aware to get prescriptions only in our office. Discussed polypharmacy & fall risk with patient's multiple sedating medications, encouraged to limit use as much as possible. Will have her see Dr. Nadege Garcia for next follow up to review pain medication regimen. Pt verbalized understanding, agreed with plan. Objective:     Patient Vitals for the past 24 hrs:   Temp Pulse Resp BP SpO2   02/02/22 1128 98.2 °F (36.8 °C) 71 20 110/70 93 %   02/02/22 0750 98.2 °F (36.8 °C) (!) 57 20 (!) 147/82 98 %   02/02/22 0205 97.6 °F (36.4 °C) 62 18 106/82 96 %   02/01/22 2200 98.2 °F (36.8 °C) 61 18 115/64 95 %   02/01/22 2115 -- -- -- -- 97 %   02/01/22 1948 98.1 °F (36.7 °C) 63 18 101/72 97 %   02/01/22 1454 97.4 °F (36.3 °C) 60 18 103/65 98 %     Oxygen Therapy  O2 Sat (%): 93 % (02/02/22 1128)  Pulse via Oximetry: 62 beats per minute (02/01/22 2115)  O2 Device: Heated; Hi flow nasal cannula (02/02/22 0739)  Skin Assessment: Clean, dry, & intact (02/01/22 2115)  O2 Flow Rate (L/min): 50 l/min (02/02/22 0739)  O2 Temperature: 87.8 °F (31 °C) (02/01/22 2115)  FIO2 (%): 100 % (02/02/22 0739)    Estimated body mass index is 27.78 kg/m² as calculated from the following:    Height as of this encounter: 5' 1\" (1.549 m). Weight as of this encounter: 66.7 kg (147 lb). Intake/Output Summary (Last 24 hours) at 2/2/2022 1425  Last data filed at 2/2/2022 1111  Gross per 24 hour   Intake 250 ml   Output 450 ml   Net -200 ml         Physical Exam:     Blood pressure 110/70, pulse 71, temperature 98.2 °F (36.8 °C), resp. rate 20, height 5' 1\" (1.549 m), weight 66.7 kg (147 lb), SpO2 93 %. General:    Well nourished. No overt distress  Head:  Normocephalic, atraumatic  Eyes:  Sclerae appear normal.  Pupils equally round. ENT:  Nares appear normal, no drainage. Moist oral mucosa  Neck:  No restricted ROM. Trachea midline   CV:   RRR. No m/r/g. No jugular venous distension. Lungs:   Wheezing bilaterally  Abdomen: Bowel sounds present.   Soft, nontender, nondistended. Extremities: No cyanosis or clubbing. No edema  Skin:     No rashes and normal coloration. Warm and dry. Neuro:  CN II-XII grossly intact. Sensation intact. A&Ox3  Psych:  Normal mood and affect. I have reviewed ordered lab tests and independently visualized imaging below:    Recent Labs:  Recent Results (from the past 48 hour(s))   CBC WITH AUTOMATED DIFF    Collection Time: 02/01/22  3:21 AM   Result Value Ref Range    WBC 9.6 4.3 - 11.1 K/uL    RBC 3.92 (L) 4.05 - 5.2 M/uL    HGB 11.3 (L) 11.7 - 15.4 g/dL    HCT 35.3 (L) 35.8 - 46.3 %    MCV 90.1 79.6 - 97.8 FL    MCH 28.8 26.1 - 32.9 PG    MCHC 32.0 31.4 - 35.0 g/dL    RDW 13.3 11.9 - 14.6 %    PLATELET 996 126 - 104 K/uL    MPV 10.0 9.4 - 12.3 FL    ABSOLUTE NRBC 0.00 0.0 - 0.2 K/uL    DF AUTOMATED      NEUTROPHILS 73 43 - 78 %    LYMPHOCYTES 14 13 - 44 %    MONOCYTES 10 4.0 - 12.0 %    EOSINOPHILS 1 0.5 - 7.8 %    BASOPHILS 0 0.0 - 2.0 %    IMMATURE GRANULOCYTES 2 0.0 - 5.0 %    ABS. NEUTROPHILS 7.0 1.7 - 8.2 K/UL    ABS. LYMPHOCYTES 1.3 0.5 - 4.6 K/UL    ABS. MONOCYTES 1.0 0.1 - 1.3 K/UL    ABS. EOSINOPHILS 0.1 0.0 - 0.8 K/UL    ABS. BASOPHILS 0.0 0.0 - 0.2 K/UL    ABS. IMM.  GRANS. 0.2 0.0 - 0.5 K/UL   METABOLIC PANEL, BASIC    Collection Time: 02/01/22  3:21 AM   Result Value Ref Range    Sodium 142 136 - 145 mmol/L    Potassium 3.8 3.5 - 5.1 mmol/L    Chloride 106 98 - 107 mmol/L    CO2 33 (H) 21 - 32 mmol/L    Anion gap 3 (L) 7 - 16 mmol/L    Glucose 93 65 - 100 mg/dL    BUN 30 (H) 8 - 23 MG/DL    Creatinine 0.69 0.6 - 1.0 MG/DL    GFR est AA >60 >60 ml/min/1.73m2    GFR est non-AA >60 >60 ml/min/1.73m2    Calcium 8.7 8.3 - 10.4 MG/DL   CBC WITH AUTOMATED DIFF    Collection Time: 02/02/22  5:01 AM   Result Value Ref Range    WBC 9.0 4.3 - 11.1 K/uL    RBC 4.07 4.05 - 5.2 M/uL    HGB 11.6 (L) 11.7 - 15.4 g/dL    HCT 35.8 35.8 - 46.3 %    MCV 88.0 79.6 - 97.8 FL    MCH 28.5 26.1 - 32.9 PG    MCHC 32.4 31.4 - 35.0 g/dL    RDW 13.1 11.9 - 14.6 %    PLATELET 153 148 - 670 K/uL    MPV 9.9 9.4 - 12.3 FL    ABSOLUTE NRBC 0.00 0.0 - 0.2 K/uL    DF AUTOMATED      NEUTROPHILS 71 43 - 78 %    LYMPHOCYTES 14 13 - 44 %    MONOCYTES 12 4.0 - 12.0 %    EOSINOPHILS 1 0.5 - 7.8 %    BASOPHILS 0 0.0 - 2.0 %    IMMATURE GRANULOCYTES 3 0.0 - 5.0 %    ABS. NEUTROPHILS 6.4 1.7 - 8.2 K/UL    ABS. LYMPHOCYTES 1.2 0.5 - 4.6 K/UL    ABS. MONOCYTES 1.1 0.1 - 1.3 K/UL    ABS. EOSINOPHILS 0.1 0.0 - 0.8 K/UL    ABS. BASOPHILS 0.0 0.0 - 0.2 K/UL    ABS. IMM. GRANS. 0.2 0.0 - 0.5 K/UL   METABOLIC PANEL, BASIC    Collection Time: 02/02/22  5:01 AM   Result Value Ref Range    Sodium 140 136 - 145 mmol/L    Potassium 3.7 3.5 - 5.1 mmol/L    Chloride 104 98 - 107 mmol/L    CO2 32 21 - 32 mmol/L    Anion gap 4 (L) 7 - 16 mmol/L    Glucose 101 (H) 65 - 100 mg/dL    BUN 26 (H) 8 - 23 MG/DL    Creatinine 0.70 0.6 - 1.0 MG/DL    GFR est AA >60 >60 ml/min/1.73m2    GFR est non-AA >60 >60 ml/min/1.73m2    Calcium 8.7 8.3 - 10.4 MG/DL   PHOSPHORUS    Collection Time: 02/02/22  5:01 AM   Result Value Ref Range    Phosphorus 2.9 2.3 - 3.7 MG/DL       All Micro Results     Procedure Component Value Units Date/Time    CULTURE, URINE [[id number]]  (Abnormal)  (Susceptibility) Collected: 01/28/22 1105    Order Status: Completed Specimen: Urine from Clean catch Updated: 01/30/22 0864     Special Requests: NO SPECIAL REQUESTS        Culture result:       >100,000 COLONIES/mL ESCHERICHIA COLI                  <1,000 CFU/ML MIXED SKIN SCOTT ISOLATED          COVID-19 RAPID TEST [[id number]]  (Abnormal) Collected: 01/26/22 1258    Order Status: Completed Specimen: Nasopharyngeal Updated: 01/26/22 1316     Specimen source NASAL        COVID-19 rapid test Detected        Comment:      The specimen is POSITIVE for SARS-CoV-2, the novel coronavirus associated with COVID-19. This test has been authorized by the FDA under an Emergency Use Authorization (EUA) for use by authorized laboratories. Fact sheet for Healthcare Providers: ConventionUpdate.co.nz  Fact sheet for Patients: ConventionUpdate.co.nz       Methodology: Isothermal Nucleic Acid Amplification               Other Studies:  No results found.     Current Meds:  Current Facility-Administered Medications   Medication Dose Route Frequency    baricitinib (OLUMIANT) tablet 4 mg  4 mg Oral DAILY    lip protectant (BLISTEX) ointment 1 Each  1 Each Topical PRN    cefTRIAXone (ROCEPHIN) 1 g in 0.9% sodium chloride (MBP/ADV) 50 mL MBP  1 g IntraVENous Q24H    apixaban (ELIQUIS) tablet 5 mg  5 mg Oral BID    metoprolol tartrate (LOPRESSOR) tablet 50 mg  50 mg Oral Q12H    traMADoL (ULTRAM) tablet 100 mg  100 mg Oral Q8H PRN    dexlansoprazole (DEXILANT) capsule (delayed release) CpDB 60 mg (Patient Supplied)  60 mg Oral DAILY    sodium chloride (NS) flush 5-10 mL  5-10 mL IntraVENous Q8H    sodium chloride (NS) flush 5-10 mL  5-10 mL IntraVENous PRN    sodium chloride (NS) flush 5-40 mL  5-40 mL IntraVENous Q8H    sodium chloride (NS) flush 5-40 mL  5-40 mL IntraVENous PRN    acetaminophen (TYLENOL) tablet 650 mg  650 mg Oral Q6H PRN    Or    acetaminophen (TYLENOL) suppository 650 mg  650 mg Rectal Q6H PRN    polyethylene glycol (MIRALAX) packet 17 g  17 g Oral DAILY PRN    ondansetron (ZOFRAN ODT) tablet 4 mg  4 mg Oral Q8H PRN    Or    ondansetron (ZOFRAN) injection 4 mg  4 mg IntraVENous Q6H PRN    guaiFENesin-dextromethorphan (ROBITUSSIN DM) 100-10 mg/5 mL syrup 5 mL  5 mL Oral Q4H PRN    dexamethasone (DECADRON) 10 mg/mL injection 6 mg  6 mg IntraVENous Q24H    cholecalciferol (VITAMIN D3) (1000 Units /25 mcg) tablet 2,000 Units  2,000 Units Oral DAILY    alum-mag hydroxide-simeth (MYLANTA) oral suspension 15 mL  15 mL Oral Q6H PRN    albuterol (PROVENTIL HFA, VENTOLIN HFA, PROAIR HFA) inhaler 2 Puff  2 Puff Inhalation Q4H PRN    atorvastatin (LIPITOR) tablet 40 mg  40 mg Oral QHS    gabapentin (NEURONTIN) capsule 300 mg  300 mg Oral TID PRN    temazepam (RESTORIL) capsule 15 mg  15 mg Oral QHS PRN       Signed:  Leonard Duke MD    Part of this note may have been written by using a voice dictation software. The note has been proof read but may still contain some grammatical/other typographical errors.

## 2022-02-02 NOTE — PROGRESS NOTES
ACUTE PHYSICAL THERAPY GOALS:  (Developed with and agreed upon by patient and/or caregiver. )    LTG:  (1.)Ms. Fitzgerald will move from supine to sit and sit to supine , scoot up and down and roll side to side in flat bed without siderails with  INDEPENDENT within 7 day(s). (2.)Ms. Fitzgerald will perform all functional transfers with  MODIFIED INDEPENDENCE using the least restrictive/no device within 7 day(s). (3.)Ms. Fitzgerald will ambulate with  CONTACT GUARD ASSIST for 100+ feet with normal vital sign response with the least restrictive/no device within 7 day(s). PHYSICAL THERAPY: Daily Note and AM Treatment Day # 4    Jessica Lee is a 80 y.o. female   PRIMARY DIAGNOSIS: Acute hypoxemic respiratory failure due to COVID-19 [facility])  Acute hypoxemic respiratory failure [facility] [U07.1, J96.01]         ASSESSMENT:     REHAB RECOMMENDATIONS: CURRENT LEVEL OF FUNCTION:  (Most Recently Demonstrated)   Recommendation to date pending progress:  [address] [place] [address] Therapy  Equipment:    To Be Determined Bed Mobility:   Contact Guard Assistance  Sit to Stand:   Contact Guard Assistance  Transfers:   Contact Guard Assistance  Gait/Mobility:   Contact Guard Assistance     ASSESSMENT:  Ms. Jerald Truong was supine and agreeable to work with therapy. She sat up then stood to RW and transferred to chair. She needed to use the toilet so she stood again to Rw and transferred to [facility]. Worked on standing balance. Transferred to chair again . Performed LE ex. Left with needs in reach. SUBJECTIVE:   Ms. Jerald Truong states [de-identified] dr said I could leave in 2 days\". SOCIAL HISTORY/ LIVING ENVIRONMENT: Ms. Jerald Truong lives with her , son, and DIL. She ambulates independently and is primarily homebound.   Support Systems: Spouse/Significant Other,Child(sky)  OBJECTIVE:     PAIN: VITAL SIGNS: LINES/DRAINS:   Pre Treatment:  0  Post Treatment: 0   Continuous Pulse Oximetry  O2 Device: Heated,Hi flow nasal cannula MOBILITY: I Mod I S SBA CGA Min Mod Max Total  NT x2 Comments:   Bed Mobility    Rolling [] [] [] [] [] [] [] [] [] [x] []    Supine to Sit [] [] [] [] [x] [] [] [] [] [] []    Scooting [] [] [] [] [x] [] [] [] [] [] []    Sit to Supine [] [] [] [] [] [] [] [] [] [x] []    Transfers    Sit to Stand [] [] [] [] [x] [] [] [] [] [] []    Bed to Chair [] [] [] [] [x] [] [] [] [] [] []    Stand to Sit [] [] [] [] [x] [] [] [] [] [] []    I=Independent, Mod I=Modified Independent, S=Supervision, SBA=Standby Assistance, CGA=Contact Guard Assistance,   Min=Minimal Assistance, Mod=Moderate Assistance, Max=Maximal Assistance, Total=Total Assistance, NT=Not Tested    BALANCE: Good Fair+ Fair Fair- Poor NT Comments   Sitting Static [] [x] [] [] [] []    Sitting Dynamic [] [x] [] [] [] []              Standing Static [] [x] [] [] [] []    Standing Dynamic [] [] [x] [] [] []      GAIT: I Mod I S SBA CGA Min Mod Max Total  NT x2 Comments:   Level of Assistance [] [] [] [] [x] [] [] [] [] [] []    Distance 5' x 2    DME Rolling Walker    Gait Quality     Weightbearing  Status N/A     I=Independent, Mod I=Modified Independent, S=Supervision, SBA=Standby Assistance, CGA=Contact Guard Assistance,   Min=Minimal Assistance, Mod=Moderate Assistance, Max=Maximal Assistance, Total=Total Assistance, NT=Not Tested    PLAN:   FREQUENCY/DURATION: PT Plan of Care: 3 times/week for duration of hospital stay or until stated goals are met, whichever comes first.  TREATMENT:     TREATMENT:   ($$ Therapeutic Activity: 38-52 mins    )  Therapeutic Activity (38 Minutes): Therapeutic activity included Supine to Sit, Scooting, Ambulation on level ground, Sitting balance , Standing balance and LE ex to improve functional Mobility, Strength and Activity tolerance.     TREATMENT GRID:  N/A    AFTER TREATMENT POSITION/PRECAUTIONS:  Chair, Needs within reach and RN notified    INTERDISCIPLINARY COLLABORATION:  RN/PCT    TOTAL TREATMENT DURATION:  PT Patient Time In/Time Out  Time In: 1110  Time Out: [address] [place], [place]

## 2022-02-03 PROBLEM — N39.0 UTI (URINARY TRACT INFECTION): Status: ACTIVE | Noted: 2022-02-03

## 2022-02-03 LAB
ANION GAP SERPL CALC-SCNC: 4 MMOL/L (ref 7–16)
BASOPHILS # BLD: 0 K/UL (ref 0–0.2)
BASOPHILS NFR BLD: 0 % (ref 0–2)
BUN SERPL-MCNC: 29 MG/DL (ref 8–23)
CALCIUM SERPL-MCNC: 8.5 MG/DL (ref 8.3–10.4)
CHLORIDE SERPL-SCNC: 104 MMOL/L (ref 98–107)
CO2 SERPL-SCNC: 32 MMOL/L (ref 21–32)
CREAT SERPL-MCNC: 0.7 MG/DL (ref 0.6–1)
DIFFERENTIAL METHOD BLD: ABNORMAL
EOSINOPHIL # BLD: 0 K/UL (ref 0–0.8)
EOSINOPHIL NFR BLD: 0 % (ref 0.5–7.8)
ERYTHROCYTE [DISTWIDTH] IN BLOOD BY AUTOMATED COUNT: 13.1 % (ref 11.9–14.6)
GLUCOSE SERPL-MCNC: 110 MG/DL (ref 65–100)
HCT VFR BLD AUTO: 34.6 % (ref 35.8–46.3)
HGB BLD-MCNC: 11.2 G/DL (ref 11.7–15.4)
IMM GRANULOCYTES # BLD AUTO: 0.2 K/UL (ref 0–0.5)
IMM GRANULOCYTES NFR BLD AUTO: 2 % (ref 0–5)
LYMPHOCYTES # BLD: 1.2 K/UL (ref 0.5–4.6)
LYMPHOCYTES NFR BLD: 13 % (ref 13–44)
MCH RBC QN AUTO: 28.9 PG (ref 26.1–32.9)
MCHC RBC AUTO-ENTMCNC: 32.4 G/DL (ref 31.4–35)
MCV RBC AUTO: 89.2 FL (ref 79.6–97.8)
MONOCYTES # BLD: 1.2 K/UL (ref 0.1–1.3)
MONOCYTES NFR BLD: 13 % (ref 4–12)
NEUTS SEG # BLD: 6.5 K/UL (ref 1.7–8.2)
NEUTS SEG NFR BLD: 71 % (ref 43–78)
NRBC # BLD: 0 K/UL (ref 0–0.2)
PLATELET # BLD AUTO: 386 K/UL (ref 150–450)
PMV BLD AUTO: 10.1 FL (ref 9.4–12.3)
POTASSIUM SERPL-SCNC: 3.7 MMOL/L (ref 3.5–5.1)
RBC # BLD AUTO: 3.88 M/UL (ref 4.05–5.2)
SODIUM SERPL-SCNC: 140 MMOL/L (ref 136–145)
WBC # BLD AUTO: 9.1 K/UL (ref 4.3–11.1)

## 2022-02-03 PROCEDURE — 74011250636 HC RX REV CODE- 250/636: Performed by: FAMILY MEDICINE

## 2022-02-03 PROCEDURE — 74011000250 HC RX REV CODE- 250: Performed by: STUDENT IN AN ORGANIZED HEALTH CARE EDUCATION/TRAINING PROGRAM

## 2022-02-03 PROCEDURE — 74011000250 HC RX REV CODE- 250: Performed by: FAMILY MEDICINE

## 2022-02-03 PROCEDURE — 74011250636 HC RX REV CODE- 250/636: Performed by: STUDENT IN AN ORGANIZED HEALTH CARE EDUCATION/TRAINING PROGRAM

## 2022-02-03 PROCEDURE — 74011000258 HC RX REV CODE- 258: Performed by: STUDENT IN AN ORGANIZED HEALTH CARE EDUCATION/TRAINING PROGRAM

## 2022-02-03 PROCEDURE — 74011250637 HC RX REV CODE- 250/637: Performed by: STUDENT IN AN ORGANIZED HEALTH CARE EDUCATION/TRAINING PROGRAM

## 2022-02-03 PROCEDURE — 94762 N-INVAS EAR/PLS OXIMTRY CONT: CPT

## 2022-02-03 PROCEDURE — 74011250637 HC RX REV CODE- 250/637: Performed by: FAMILY MEDICINE

## 2022-02-03 PROCEDURE — 77010033711 HC HIGH FLOW OXYGEN

## 2022-02-03 PROCEDURE — 74011250637 HC RX REV CODE- 250/637: Performed by: NURSE PRACTITIONER

## 2022-02-03 PROCEDURE — 36415 COLL VENOUS BLD VENIPUNCTURE: CPT

## 2022-02-03 PROCEDURE — 80048 BASIC METABOLIC PNL TOTAL CA: CPT

## 2022-02-03 PROCEDURE — 85025 COMPLETE CBC W/AUTO DIFF WBC: CPT

## 2022-02-03 PROCEDURE — 65660000000 HC RM CCU STEPDOWN

## 2022-02-03 RX ORDER — DEXAMETHASONE 4 MG/1
6 TABLET ORAL DAILY
Status: COMPLETED | OUTPATIENT
Start: 2022-02-04 | End: 2022-02-04

## 2022-02-03 RX ADMIN — CEFTRIAXONE 1 G: 1 INJECTION, POWDER, FOR SOLUTION INTRAMUSCULAR; INTRAVENOUS at 08:16

## 2022-02-03 RX ADMIN — DEXAMETHASONE SODIUM PHOSPHATE 6 MG: 10 INJECTION, SOLUTION INTRAMUSCULAR; INTRAVENOUS at 08:17

## 2022-02-03 RX ADMIN — APIXABAN 5 MG: 5 TABLET, FILM COATED ORAL at 20:44

## 2022-02-03 RX ADMIN — SODIUM CHLORIDE, PRESERVATIVE FREE 10 ML: 5 INJECTION INTRAVENOUS at 13:26

## 2022-02-03 RX ADMIN — SODIUM CHLORIDE, PRESERVATIVE FREE 10 ML: 5 INJECTION INTRAVENOUS at 05:51

## 2022-02-03 RX ADMIN — SODIUM CHLORIDE, PRESERVATIVE FREE 10 ML: 5 INJECTION INTRAVENOUS at 21:33

## 2022-02-03 RX ADMIN — APIXABAN 5 MG: 5 TABLET, FILM COATED ORAL at 08:17

## 2022-02-03 RX ADMIN — ATORVASTATIN CALCIUM 40 MG: 40 TABLET, FILM COATED ORAL at 20:45

## 2022-02-03 RX ADMIN — DEXLANSOPRAZOLE 60 MG: 60 CAPSULE, DELAYED RELEASE ORAL at 08:16

## 2022-02-03 RX ADMIN — BARICITINIB 4 MG: 2 TABLET, FILM COATED ORAL at 08:16

## 2022-02-03 RX ADMIN — METOPROLOL TARTRATE 50 MG: 50 TABLET, FILM COATED ORAL at 08:17

## 2022-02-03 RX ADMIN — VITAMIN D, TAB 1000IU (100/BT) 2000 UNITS: 25 TAB at 08:17

## 2022-02-03 RX ADMIN — TEMAZEPAM 15 MG: 15 CAPSULE ORAL at 20:52

## 2022-02-03 NOTE — PROGRESS NOTES
Pt resting in  Bed with eyes closed. Pt on airvo at 50L 100 % sat96% at this time.   no s/sx of distress noted at this time. Pt encouraged to call light in reach, bed alarm on. Will monitor.

## 2022-02-03 NOTE — PROGRESS NOTES
Hospitalist Progress Note   Admit Date:  2022 12:32 PM   Name:  Erum Johnson   Age:  80 y.o. Sex:  female  :  1935   MRN:  [id number]   Room:  [facility]/    Presenting Complaint: Positive For Covid-19    Reason(s) for Admission: Acute hypoxemic respiratory failure due to COVID-19 [facility]) [U07.1, J96.01]     Hospital Course & Interval History:   Ary Curiel a 80 y.o. female with medical history of HTN, mixed HLD, GERD, Glaucoma, Hay fever,  HLD, insomnia who presented from home via EMS with hypoxia and AMS with recent diagnosis of COVID-19. EMS reports patient was hypoxic on arrival, O2 saturation in the 60s.  Placed on 6 L and given terbutaline.  EMS also noted family some concern for patient being more altered than normal.     In ED, T102 BP 91/56    spo2 74% RA, 85% 6L NC, 91% 55L/m  LA 3.9 CRP 17.3 D dimer 3.85 NT pro BNP 3481 HS trop 1544>1609   CXR BL infiltrates   rec'd Tylenol 1000 mg and decadron 10 mg IV      EKG shows sinus tachycardia, rate 115, , QRS 92, QTc 491     She is alert and oriented to person, place  but not month or year. Unable to get in touch with either son or . She notes symptom onset 5 days ago. Patient denies CP or palpitations, nausea, vomiting, diarrhea, fevers. +chills, +cough. Says  is sick. Discussed Actemra with patient including risks and benefits.      satting 94% on 55/90% airvo.     Subjective (22):   Wants to go home,pt on fio2 80%    Assessment & Plan:   Acute Hypoxemic Respiratory Failure 2/2 COVID-19 PNA   Patient is saturating well on a low 50 L/MIN  Suspected Cytokine Storm with elevated markers   - COVID +  ,  Unvaccinated   - Decadron 6 mg x 10 doses   -Continue Bacitricininb  2/3/2022- on airvo at 80% fio2    uti- e.coli- on rocephin        New onset Afib  EKG showed A. fib with RVR with HR 170s  Troponins were elevated and down trended  Lilian vasc score of 4 and stroke risk of 4.8% per yr  Continue metoprolol  Continue Eliquis   echo with normal EF   Cardiology signed off  -    Sepsis 2/2 COVID -   Resolved   admin 30 cc/kg bolus. 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 Pro calcitonin elevated 1.15  Ct chest showed covid and no PE         Troponemia  Cardiac enzymes downtrending. EKG does not demonstrate ST segment elevation. Cardiology consulted Hypercoagulable state associated with COVID-19 -      Hypokalemia // Hypomagnesemia - associated with prolonged QTc   Resolved     Acute Encephalopathy -  Resolved        Chronic insomnia - takes temazepam 30 mg nightly for this. At risk of withdrawal. Reduce dose to 15 mg as needed.      Lumbar spondylosis - f/b pain management. Takes gabapentin 800 mg BID, tramadol 100 mg BID.  Reduce gabapentin 300 mg TID prn, hold tramadol for now.         HLD - continue  atorvastatin      H/o HTN - Normotensive Monitor      GERD - resume protonix.           Diet:  ADULT DIET Easy to Chew; Low Fat/Low Chol/High Fiber/EVA  DVT PPx: Eliquis  Code status: Full Code                Hospital Problems as of 2/3/2022 Date Reviewed: 4/12/2018          Codes Class Noted - Resolved POA    UTI (urinary tract infection) ICD-10-CM: N39.0  ICD-9-CM: 599.0  2/3/2022 - Present Unknown        * (Principal) Acute hypoxemic respiratory failure due to COVID-19 [facility]) ICD-10-CM: U07.1, J96.01  ICD-9-CM: 518.81, 079.89, 799.02  1/26/2022 - Present Yes        Sepsis due to COVID-19 [facility]) ICD-10-CM: U07.1, A41.89  ICD-9-CM: 079.89, 995.91  1/26/2022 - Present Yes        Hypomagnesemia ICD-10-CM: W63.42  ICD-9-CM: 275.2  1/26/2022 - Present Yes        Hypokalemia ICD-10-CM: E87.6  ICD-9-CM: 276.8  1/26/2022 - Present Yes        Prolonged Q-T interval on ECG ICD-10-CM: R94.31  ICD-9-CM: 794.31  1/26/2022 - Present Yes        Elevated troponin ICD-10-CM: R77.8  ICD-9-CM: 790.6  1/26/2022 - Present Yes        Toxic metabolic encephalopathy ICD-10-CM: G92.8  ICD-9-CM: 349.82  1/26/2022 - Present Yes        Chronic prescription benzodiazepine use ICD-10-CM: Z79.899  ICD-9-CM: V58.69  1/26/2022 - Present Yes        Hypercoagulable state associated with COVID-19 [facility]) ICD-10-CM: U07.1, D68.69  ICD-9-CM: 289.82, 079.89  1/26/2022 - Present Yes        Transaminitis ICD-10-CM: R74.01  ICD-9-CM: 790.4  1/26/2022 - Present Yes        Gastroesophageal reflux disease without esophagitis ICD-10-CM: K21.9  ICD-9-CM: 530.81  9/28/2017 - Present Yes    Overview Signed 1/9/2018 10:12 AM by Geovanni Montgomery DO     Last Assessment & Plan:   Stable. Essential (primary) hypertension ICD-10-CM: I10  ICD-9-CM: 401.9  10/28/2016 - Present Yes    Overview Signed 1/9/2018 10:12 AM by Geovanni Montgomery DO     Last Assessment & Plan:   Stable. Hyperlipidemia ICD-10-CM: E78.5  ICD-9-CM: 272.4  10/28/2016 - Present Yes    Overview Signed 1/9/2018 10:12 AM by Geovanni Montgomery DO     Last Assessment & Plan:   Return for fasting labs. Insomnia, persistent ICD-10-CM: G47.00  ICD-9-CM: 307.42  4/27/2016 - Present Yes    Overview Signed 1/9/2018 10:12 AM by Geovanni Montgomery DO     Last Assessment & Plan:   Restoril prescription printed & given. Primary osteoarthritis involving multiple joints ICD-10-CM: M89.49  ICD-9-CM: 715.98  7/31/2015 - Present Yes    Overview Signed 1/9/2018 10:12 AM by Geovanni Montgomery DO     Last Assessment & Plan:   3 Norco scripts printed with do not fill before dates of 10/10, 11/9, 12/9. Tramadol prescription printed & given (decreased from 150 to 120 tablets per month based on how patient is taking it). Aware of risk of sedation and aware to get prescriptions only in our office. Discussed polypharmacy & fall risk with patient's multiple sedating medications, encouraged to limit use as much as possible.  Will have her see Dr. Mar Shows for next follow up to review pain medication regimen. Pt verbalized understanding, agreed with plan. Objective:     Patient Vitals for the past 24 hrs:   Temp Pulse Resp BP SpO2   02/03/22 1506 98.1 °F (36.7 °C) 63 19 (!) 92/56 96 %   02/03/22 1100 97.6 °F (36.4 °C) 63 18 92/61 94 %   02/03/22 0920 -- -- -- -- 93 %   02/03/22 0725 97.8 °F (36.6 °C) 65 20 117/77 92 %   02/03/22 0235 98.1 °F (36.7 °C) 67 18 119/65 94 %   02/02/22 2152 97.5 °F (36.4 °C) 67 18 103/66 97 %   02/02/22 2110 -- -- -- -- 97 %   02/02/22 1951 98.2 °F (36.8 °C) 68 18 102/66 96 %     Oxygen Therapy  O2 Sat (%): 96 % (02/03/22 1506)  Pulse via Oximetry: 62 beats per minute (02/01/22 2115)  O2 Device: Heated; Hi flow nasal cannula (02/03/22 0920)  Skin Assessment: Clean, dry, & intact (02/01/22 2115)  O2 Flow Rate (L/min): 50 l/min (02/03/22 0920)  O2 Temperature: 87.8 °F (31 °C) (02/01/22 2115)  FIO2 (%): 80 % (02/03/22 0920)    Estimated body mass index is 27.62 kg/m² as calculated from the following:    Height as of this encounter: 5' 1\" (1.549 m). Weight as of this encounter: 66.3 kg (146 lb 3.2 oz). Intake/Output Summary (Last 24 hours) at 2/3/2022 1805  Last data filed at 2/3/2022 1357  Gross per 24 hour   Intake 236 ml   Output --   Net 236 ml         Physical Exam:     Blood pressure (!) 92/56, pulse 63, temperature 98.1 °F (36.7 °C), resp. rate 19, height 5' 1\" (1.549 m), weight 66.3 kg (146 lb 3.2 oz), SpO2 96 %. General:    Well nourished. Mild resp distress on 80% fio2,airvo  Head:  Normocephalic, atraumatic  Eyes:  Sclerae appear normal.  Pupils equally round. ENT:  Nares appear normal, no drainage. Moist oral mucosa  Neck:  No restricted ROM. Trachea midline   CV:   RRR. No m/r/g. No jugular venous distension. Lungs:   Bilateral coarse breath sounds  Abdomen: Bowel sounds present. Soft, nontender, nondistended. Extremities: No cyanosis or clubbing. No edema  Skin:     No rashes and normal coloration. Warm and dry.     Neuro:  CN II-XII grossly intact. Sensation intact. A&Ox3  Psych:  Normal mood and affect. I have reviewed ordered lab tests and independently visualized imaging below:    Recent Labs:  Recent Results (from the past 48 hour(s))   CBC WITH AUTOMATED DIFF    Collection Time: 02/02/22  5:01 AM   Result Value Ref Range    WBC 9.0 4.3 - 11.1 K/uL    RBC 4.07 4.05 - 5.2 M/uL    HGB 11.6 (L) 11.7 - 15.4 g/dL    HCT 35.8 35.8 - 46.3 %    MCV 88.0 79.6 - 97.8 FL    MCH 28.5 26.1 - 32.9 PG    MCHC 32.4 31.4 - 35.0 g/dL    RDW 13.1 11.9 - 14.6 %    PLATELET 813 599 - 724 K/uL    MPV 9.9 9.4 - 12.3 FL    ABSOLUTE NRBC 0.00 0.0 - 0.2 K/uL    DF AUTOMATED      NEUTROPHILS 71 43 - 78 %    LYMPHOCYTES 14 13 - 44 %    MONOCYTES 12 4.0 - 12.0 %    EOSINOPHILS 1 0.5 - 7.8 %    BASOPHILS 0 0.0 - 2.0 %    IMMATURE GRANULOCYTES 3 0.0 - 5.0 %    ABS. NEUTROPHILS 6.4 1.7 - 8.2 K/UL    ABS. LYMPHOCYTES 1.2 0.5 - 4.6 K/UL    ABS. MONOCYTES 1.1 0.1 - 1.3 K/UL    ABS. EOSINOPHILS 0.1 0.0 - 0.8 K/UL    ABS. BASOPHILS 0.0 0.0 - 0.2 K/UL    ABS. IMM.  GRANS. 0.2 0.0 - 0.5 K/UL   METABOLIC PANEL, BASIC    Collection Time: 02/02/22  5:01 AM   Result Value Ref Range    Sodium 140 136 - 145 mmol/L    Potassium 3.7 3.5 - 5.1 mmol/L    Chloride 104 98 - 107 mmol/L    CO2 32 21 - 32 mmol/L    Anion gap 4 (L) 7 - 16 mmol/L    Glucose 101 (H) 65 - 100 mg/dL    BUN 26 (H) 8 - 23 MG/DL    Creatinine 0.70 0.6 - 1.0 MG/DL    GFR est AA >60 >60 ml/min/1.73m2    GFR est non-AA >60 >60 ml/min/1.73m2    Calcium 8.7 8.3 - 10.4 MG/DL   PHOSPHORUS    Collection Time: 02/02/22  5:01 AM   Result Value Ref Range    Phosphorus 2.9 2.3 - 3.7 MG/DL   CBC WITH AUTOMATED DIFF    Collection Time: 02/03/22  4:09 AM   Result Value Ref Range    WBC 9.1 4.3 - 11.1 K/uL    RBC 3.88 (L) 4.05 - 5.2 M/uL    HGB 11.2 (L) 11.7 - 15.4 g/dL    HCT 34.6 (L) 35.8 - 46.3 %    MCV 89.2 79.6 - 97.8 FL    MCH 28.9 26.1 - 32.9 PG    MCHC 32.4 31.4 - 35.0 g/dL    RDW 13.1 11.9 - 14.6 %    PLATELET 013 127 - 450 K/uL    MPV 10.1 9.4 - 12.3 FL    ABSOLUTE NRBC 0.00 0.0 - 0.2 K/uL    DF AUTOMATED      NEUTROPHILS 71 43 - 78 %    LYMPHOCYTES 13 13 - 44 %    MONOCYTES 13 (H) 4.0 - 12.0 %    EOSINOPHILS 0 (L) 0.5 - 7.8 %    BASOPHILS 0 0.0 - 2.0 %    IMMATURE GRANULOCYTES 2 0.0 - 5.0 %    ABS. NEUTROPHILS 6.5 1.7 - 8.2 K/UL    ABS. LYMPHOCYTES 1.2 0.5 - 4.6 K/UL    ABS. MONOCYTES 1.2 0.1 - 1.3 K/UL    ABS. EOSINOPHILS 0.0 0.0 - 0.8 K/UL    ABS. BASOPHILS 0.0 0.0 - 0.2 K/UL    ABS. IMM. GRANS. 0.2 0.0 - 0.5 K/UL   METABOLIC PANEL, BASIC    Collection Time: 02/03/22  4:09 AM   Result Value Ref Range    Sodium 140 136 - 145 mmol/L    Potassium 3.7 3.5 - 5.1 mmol/L    Chloride 104 98 - 107 mmol/L    CO2 32 21 - 32 mmol/L    Anion gap 4 (L) 7 - 16 mmol/L    Glucose 110 (H) 65 - 100 mg/dL    BUN 29 (H) 8 - 23 MG/DL    Creatinine 0.70 0.6 - 1.0 MG/DL    GFR est AA >60 >60 ml/min/1.73m2    GFR est non-AA >60 >60 ml/min/1.73m2    Calcium 8.5 8.3 - 10.4 MG/DL       All Micro Results     Procedure Component Value Units Date/Time    CULTURE, URINE [[id number]]  (Abnormal)  (Susceptibility) Collected: 01/28/22 1105    Order Status: Completed Specimen: Urine from Clean catch Updated: 01/30/22 0843     Special Requests: NO SPECIAL REQUESTS        Culture result:       >100,000 COLONIES/mL ESCHERICHIA COLI                  <1,000 CFU/ML MIXED SKIN SCOTT ISOLATED          COVID-19 RAPID TEST [[id number]]  (Abnormal) Collected: 01/26/22 1258    Order Status: Completed Specimen: Nasopharyngeal Updated: 01/26/22 1316     Specimen source NASAL        COVID-19 rapid test Detected        Comment:      The specimen is POSITIVE for SARS-CoV-2, the novel coronavirus associated with COVID-19. This test has been authorized by the FDA under an Emergency Use Authorization (EUA) for use by authorized laboratories.         Fact sheet for Healthcare Providers: ConventionUpdate.co.nz  Fact sheet for Patients: iTendency.uy       Methodology: Isothermal Nucleic Acid Amplification               Other Studies:  No results found.     Current Meds:  Current Facility-Administered Medications   Medication Dose Route Frequency    [START ON 2/4/2022] dexAMETHasone (DECADRON) tablet 6 mg  6 mg Oral DAILY    baricitinib (OLUMIANT) tablet 4 mg  4 mg Oral DAILY    lip protectant (BLISTEX) ointment 1 Each  1 Each Topical PRN    cefTRIAXone (ROCEPHIN) 1 g in 0.9% sodium chloride (MBP/ADV) 50 mL MBP  1 g IntraVENous Q24H    apixaban (ELIQUIS) tablet 5 mg  5 mg Oral BID    metoprolol tartrate (LOPRESSOR) tablet 50 mg  50 mg Oral Q12H    traMADoL (ULTRAM) tablet 100 mg  100 mg Oral Q8H PRN    dexlansoprazole (DEXILANT) capsule (delayed release) CpDB 60 mg (Patient Supplied)  60 mg Oral DAILY    sodium chloride (NS) flush 5-10 mL  5-10 mL IntraVENous Q8H    sodium chloride (NS) flush 5-10 mL  5-10 mL IntraVENous PRN    sodium chloride (NS) flush 5-40 mL  5-40 mL IntraVENous Q8H    sodium chloride (NS) flush 5-40 mL  5-40 mL IntraVENous PRN    acetaminophen (TYLENOL) tablet 650 mg  650 mg Oral Q6H PRN    Or    acetaminophen (TYLENOL) suppository 650 mg  650 mg Rectal Q6H PRN    polyethylene glycol (MIRALAX) packet 17 g  17 g Oral DAILY PRN    ondansetron (ZOFRAN ODT) tablet 4 mg  4 mg Oral Q8H PRN    Or    ondansetron (ZOFRAN) injection 4 mg  4 mg IntraVENous Q6H PRN    guaiFENesin-dextromethorphan (ROBITUSSIN DM) 100-10 mg/5 mL syrup 5 mL  5 mL Oral Q4H PRN    cholecalciferol (VITAMIN D3) (1000 Units /25 mcg) tablet 2,000 Units  2,000 Units Oral DAILY    alum-mag hydroxide-simeth (MYLANTA) oral suspension 15 mL  15 mL Oral Q6H PRN    albuterol (PROVENTIL HFA, VENTOLIN HFA, PROAIR HFA) inhaler 2 Puff  2 Puff Inhalation Q4H PRN    atorvastatin (LIPITOR) tablet 40 mg  40 mg Oral QHS    gabapentin (NEURONTIN) capsule 300 mg  300 mg Oral TID PRN    temazepam (RESTORIL) capsule 15 mg  15 mg Oral [facility] PRN       Signed:  Sadia Fuentes MD

## 2022-02-03 NOTE — PROGRESS NOTES
Pt resting in bed. airvo in place at 50L 80% with sat 92 % at this time. Call light in reach, will monitor.

## 2022-02-03 NOTE — PROGRESS NOTES
Report received from [address] [place] [address]. Patient in bed resting. Respirations even and unlabored. On airvo 50L/90%. All needs addressed. Safety measures in place. Call light in reach. No signs of acute distress at this time. Will continue to monitor.

## 2022-02-03 NOTE — PROGRESS NOTES
Pt sitting up in bed. Pt on airvo at 50l 80% at this time sat 96%. Pt denies pain or distress at this time. Call light in reach, will monitor.

## 2022-02-03 NOTE — PROGRESS NOTES
Patient in bed resting. Respirations even and unlabored. On airvo 50L/90%. All needs addressed. Safety measures in place. Call light within reach. No signs of acute distress at this time. Will continue to monitor. Preparing to give report to oncoming RN.

## 2022-02-03 NOTE — PROGRESS NOTES
Problem: Falls - Risk of  Goal: *Absence of Falls  Description: Document Sneha Embs Fall Risk and appropriate interventions in the flowsheet.   Outcome: Progressing Towards Goal  Note: Fall Risk Interventions:  Mobility Interventions: Patient to call before getting OOB         Medication Interventions: Evaluate medications/consider consulting pharmacy    Elimination Interventions: Call light in reach              Problem: Patient Education: Go to Patient Education Activity  Goal: Patient/Family Education  Outcome: Progressing Towards Goal

## 2022-02-03 NOTE — PROGRESS NOTES
Physical therapy note: Attempted PT this PM. Pt declined. Seems depressed stating she wants to go home. Will continue to treat as pt is able and time/schedule permit.     Argelia Valera, PTA

## 2022-02-04 LAB
ALBUMIN SERPL-MCNC: 2.6 G/DL (ref 3.2–4.6)
ALBUMIN/GLOB SERPL: 0.8 {RATIO} (ref 1.2–3.5)
ALP SERPL-CCNC: 70 U/L (ref 50–136)
ALT SERPL-CCNC: 25 U/L (ref 12–65)
ANION GAP SERPL CALC-SCNC: 3 MMOL/L (ref 7–16)
AST SERPL-CCNC: 25 U/L (ref 15–37)
BILIRUB SERPL-MCNC: 0.4 MG/DL (ref 0.2–1.1)
BUN SERPL-MCNC: 33 MG/DL (ref 8–23)
CALCIUM SERPL-MCNC: 8.7 MG/DL (ref 8.3–10.4)
CHLORIDE SERPL-SCNC: 102 MMOL/L (ref 98–107)
CO2 SERPL-SCNC: 34 MMOL/L (ref 21–32)
CREAT SERPL-MCNC: 0.7 MG/DL (ref 0.6–1)
GLOBULIN SER CALC-MCNC: 3.1 G/DL (ref 2.3–3.5)
GLUCOSE SERPL-MCNC: 105 MG/DL (ref 65–100)
POTASSIUM SERPL-SCNC: 4 MMOL/L (ref 3.5–5.1)
PROT SERPL-MCNC: 5.7 G/DL (ref 6.3–8.2)
SODIUM SERPL-SCNC: 139 MMOL/L (ref 136–145)

## 2022-02-04 PROCEDURE — 74011250637 HC RX REV CODE- 250/637: Performed by: NURSE PRACTITIONER

## 2022-02-04 PROCEDURE — 94762 N-INVAS EAR/PLS OXIMTRY CONT: CPT

## 2022-02-04 PROCEDURE — 97535 SELF CARE MNGMENT TRAINING: CPT

## 2022-02-04 PROCEDURE — 36415 COLL VENOUS BLD VENIPUNCTURE: CPT

## 2022-02-04 PROCEDURE — 74011000258 HC RX REV CODE- 258: Performed by: STUDENT IN AN ORGANIZED HEALTH CARE EDUCATION/TRAINING PROGRAM

## 2022-02-04 PROCEDURE — 65660000000 HC RM CCU STEPDOWN

## 2022-02-04 PROCEDURE — 74011250637 HC RX REV CODE- 250/637: Performed by: FAMILY MEDICINE

## 2022-02-04 PROCEDURE — 74011250636 HC RX REV CODE- 250/636: Performed by: STUDENT IN AN ORGANIZED HEALTH CARE EDUCATION/TRAINING PROGRAM

## 2022-02-04 PROCEDURE — 74011000250 HC RX REV CODE- 250: Performed by: STUDENT IN AN ORGANIZED HEALTH CARE EDUCATION/TRAINING PROGRAM

## 2022-02-04 PROCEDURE — 80053 COMPREHEN METABOLIC PANEL: CPT

## 2022-02-04 PROCEDURE — 97112 NEUROMUSCULAR REEDUCATION: CPT

## 2022-02-04 PROCEDURE — 74011250637 HC RX REV CODE- 250/637: Performed by: STUDENT IN AN ORGANIZED HEALTH CARE EDUCATION/TRAINING PROGRAM

## 2022-02-04 PROCEDURE — 74011000250 HC RX REV CODE- 250: Performed by: FAMILY MEDICINE

## 2022-02-04 PROCEDURE — 74011250636 HC RX REV CODE- 250/636: Performed by: FAMILY MEDICINE

## 2022-02-04 PROCEDURE — 77010033711 HC HIGH FLOW OXYGEN

## 2022-02-04 PROCEDURE — 97530 THERAPEUTIC ACTIVITIES: CPT

## 2022-02-04 RX ORDER — CHOLECALCIFEROL (VITAMIN D3) 125 MCG
5 CAPSULE ORAL
Status: DISCONTINUED | OUTPATIENT
Start: 2022-02-04 | End: 2022-02-12

## 2022-02-04 RX ORDER — ESCITALOPRAM OXALATE 10 MG/1
5 TABLET ORAL EVERY EVENING
Status: DISCONTINUED | OUTPATIENT
Start: 2022-02-04 | End: 2022-02-11

## 2022-02-04 RX ADMIN — DEXAMETHASONE 6 MG: 4 TABLET ORAL at 09:00

## 2022-02-04 RX ADMIN — SODIUM CHLORIDE, PRESERVATIVE FREE 5 ML: 5 INJECTION INTRAVENOUS at 21:01

## 2022-02-04 RX ADMIN — CEFTRIAXONE 1 G: 1 INJECTION, POWDER, FOR SOLUTION INTRAMUSCULAR; INTRAVENOUS at 08:06

## 2022-02-04 RX ADMIN — TEMAZEPAM 15 MG: 15 CAPSULE ORAL at 21:14

## 2022-02-04 RX ADMIN — APIXABAN 5 MG: 5 TABLET, FILM COATED ORAL at 21:01

## 2022-02-04 RX ADMIN — SODIUM CHLORIDE, PRESERVATIVE FREE 10 ML: 5 INJECTION INTRAVENOUS at 16:23

## 2022-02-04 RX ADMIN — SODIUM CHLORIDE, PRESERVATIVE FREE 10 ML: 5 INJECTION INTRAVENOUS at 05:08

## 2022-02-04 RX ADMIN — ESCITALOPRAM OXALATE 5 MG: 10 TABLET ORAL at 21:00

## 2022-02-04 RX ADMIN — METOPROLOL TARTRATE 50 MG: 50 TABLET, FILM COATED ORAL at 08:05

## 2022-02-04 RX ADMIN — VITAMIN D, TAB 1000IU (100/BT) 2000 UNITS: 25 TAB at 08:05

## 2022-02-04 RX ADMIN — ALUMINUM HYDROXIDE, MAGNESIUM HYDROXIDE, AND SIMETHICONE 15 ML: 200; 200; 20 SUSPENSION ORAL at 21:14

## 2022-02-04 RX ADMIN — BARICITINIB 4 MG: 2 TABLET, FILM COATED ORAL at 08:05

## 2022-02-04 RX ADMIN — ATORVASTATIN CALCIUM 40 MG: 40 TABLET, FILM COATED ORAL at 21:00

## 2022-02-04 RX ADMIN — SODIUM CHLORIDE, PRESERVATIVE FREE 10 ML: 5 INJECTION INTRAVENOUS at 16:22

## 2022-02-04 RX ADMIN — APIXABAN 5 MG: 5 TABLET, FILM COATED ORAL at 08:05

## 2022-02-04 RX ADMIN — Medication 5 MG: at 21:04

## 2022-02-04 NOTE — PROGRESS NOTES
CM spoke with patient's son. They are prepared to take patient home when stable. They are interested in [facility]. They have all the dme except 02. CM following.

## 2022-02-04 NOTE — PROGRESS NOTES
Alerted by tele that pt continues to lesly to HR 48-49. Dr Vanesa Lopes notified via [employer]. Responded to lower tele notification parameters to HR <45. Spoke with tele and advised them of new notification parameters.

## 2022-02-04 NOTE — PROGRESS NOTES
Alerted by tele that pt was sinus lesly with HR 48. Pt awakened for vital signs (WNL) and assessment. Pt denies any symptoms r/t bradycardia. Immediately following this tele again caled to advise pt HR 49. Dr Laws Early alerted to both notifications via [employer]. No new orders at this time.

## 2022-02-04 NOTE — PROGRESS NOTES
AM assessment completed. Pt AAO x 4 with flat affect. On airvo 50 L  85% and maintaining 95% O2 sat. Will continue to monitor.

## 2022-02-04 NOTE — PROGRESS NOTES
ACUTE PHYSICAL THERAPY GOALS:  (Developed with and agreed upon by patient and/or caregiver. )    LTG:  (1.)Ms. Fitzgerald will move from supine to sit and sit to supine , scoot up and down and roll side to side in flat bed without siderails with  INDEPENDENT within 7 day(s). (2.)Ms. Fitzgerald will perform all functional transfers with  MODIFIED INDEPENDENCE using the least restrictive/no device within 7 day(s). (3.)Ms. Fitzgerald will ambulate with  CONTACT GUARD ASSIST for 100+ feet with normal vital sign response with the least restrictive/no device within 7 day(s). PHYSICAL THERAPY: Daily Note and PM Treatment Day # 5    Myrna Nassar is a 80 y.o. female   PRIMARY DIAGNOSIS: Acute hypoxemic respiratory failure due to COVID-19 [facility])  [facility]ute hypox[facility] [U07.1, J96.01]         ASSESSMENT:     REHAB RECOMMENDATIONS: CURRENT LEVEL OF FUNCTION:  (Most Recently Demonstrated)   Recommendation to date pending progress:  [address] [place] [address] Therapy  Equipment:    To Be Determined Bed Mobility:   Contact Guard Assistance  Sit to Stand:   Contact Guard Assistance  Transfers:   Contact Guard Assistance  Gait/Mobility:   Contact Guard Assistance     ASSESSMENT:  Ms. Sangita Fay was supine and agreeable to work with PT/OT. She sat up then stood to RW and worked on standing balance. Transferred to chair. Performed LE ex. Left with needs in reach. SUBJECTIVE:   Ms. Sangita Fay states \"[facility] are starting me on antidepressants\". SOCIAL HISTORY/ LIVING ENVIRONMENT: Ms. Sangita Fay lives with her , son, and DIL. She ambulates independently and is primarily homebound.   Support Systems: Spouse/Significant Other,Child(sky)  OBJECTIVE:     PAIN: VITAL SIGNS: LINES/DRAINS:   Pre Treatment:  0  Post Treatment: 0  Low 90's high 80's throughout Continuous Pulse Oximetry  O2 Device: Heated,Hi flow nasal cannula       MOBILITY: I Mod I S SBA CGA Min Mod Max Total  NT x2 Comments:   Bed Mobility Rolling [] [] [] [] [] [] [] [] [] [x] []    Supine to Sit [] [] [] [] [x] [] [] [] [] [] []    Scooting [] [] [] [] [x] [] [] [] [] [] []    Sit to Supine [] [] [] [] [] [] [] [] [] [x] []    Transfers    Sit to Stand [] [] [] [] [x] [] [] [] [] [] []    Bed to Chair [] [] [] [] [x] [] [] [] [] [] []    Stand to Sit [] [] [] [] [x] [] [] [] [] [] []    I=Independent, Mod I=Modified Independent, S=Supervision, SBA=Standby Assistance, CGA=Contact Guard Assistance,   Min=Minimal Assistance, Mod=Moderate Assistance, Max=Maximal Assistance, Total=Total Assistance, NT=Not Tested    BALANCE: Good Fair+ Fair Fair- Poor NT Comments   Sitting Static [] [x] [] [] [] []    Sitting Dynamic [] [x] [] [] [] []              Standing Static [] [x] [] [] [] []    Standing Dynamic [] [] [x] [] [] []      GAIT: I Mod I S SBA CGA Min Mod Max Total  NT x2 Comments:   Level of Assistance [] [] [] [] [x] [] [] [] [] [] []    Distance 5'     DME Rolling Walker    Gait Quality     Weightbearing  Status N/A     I=Independent, Mod I=Modified Independent, S=Supervision, SBA=Standby Assistance, CGA=Contact Guard Assistance,   Min=Minimal Assistance, Mod=Moderate Assistance, Max=Maximal Assistance, Total=Total Assistance, NT=Not Tested    PLAN:   FREQUENCY/DURATION: PT Plan of Care: 3 times/week for duration of hospital stay or until stated goals are met, whichever comes first.  TREATMENT:     TREATMENT:   ($$ Therapeutic Activity: 23-37 mins    )  Therapeutic Activity (30 Minutes): Therapeutic activity included Supine to Sit, Scooting, Ambulation on level ground, Sitting balance , Standing balance and LE ex to improve functional Mobility, Strength and Activity tolerance.     TREATMENT GRID:  N/A    AFTER TREATMENT POSITION/PRECAUTIONS:  Chair, Needs within reach and RN notified    INTERDISCIPLINARY COLLABORATION:  RN/PCT    TOTAL TREATMENT DURATION:  PT Patient Time In/Time Out  Time In: 1420  Time Out: [address] [facility] [address] Muniz, PTA

## 2022-02-04 NOTE — PROGRESS NOTES
Comprehensive Nutrition Assessment    Type and Reason for Visit: Initial,RD nutrition re-screen/LOS    Nutrition Recommendations/Plan:    Continue current diet.  Start ensure enlive TID with meals - provides 350 kcal and 20 gm PRO per bottle. Malnutrition Assessment:  Malnutrition Status: At risk for malnutrition (specify) (predicted inadequate PO intake since admission.)    Nutrition Assessment:   Nutrition History: Nutrition history unable to be obtained d/t isolation precautions. Pt did not answer room phone x 2. No answer from pt's son. Nutrition Background: Pt admitted with covid 19, diarrhea, SOB, increased confusion. PMH notable for HTN, HLD, GERD. Nutrition Interval:  POC d/w RN. Per RN, Deborah Salvador, pt consumed ~75% of lunch today (fed herself, no issues chewing/swallowing observed). Nutrition Related Findings:   NFPE deferred d/t isolation precautions. Current Nutrition Therapies:  ADULT DIET Easy to Chew; Low Fat/Low Chol/High Fiber/EVA    Current Intake:   Average Meal Intake: 26-50% Average Supplement Intake: None ordered      Anthropometric Measures:  Height: 5' 1\" (154.9 cm)  Current Body Wt: 66.3 kg (146 lb 2.6 oz) (2/3), Weight source: Not specified  BMI: 27.6, Overweight (BMI 25.0-29. 9)  Admission Body Weight: 146 lb 13.2 oz (1/26, wt source not specified)  Ideal Body Weight (lbs) (Calculated): 105 lbs (48 kg), 139.2 %  Edema: No data recorded     Weight 75.3 kg (166 lb) 12/10/2020 3:23 PM EST     Most recent weight in care everywhere is from 12/2020 noted above. Potential 20 lb weight loss over ~2 years.      Estimated Daily Nutrient Needs:  Energy (kcal/day): 7378-5168 (Kcal/kg (20-25), Weight Used: Current (66.3 kg))  Protein (g/day): 66-80 (1-1.2) Weight Used: (Current (66.3 kg))  Fluid (ml/day): 3021-9726 (1 ml/kcal (or per MD))    Nutrition Diagnosis:   · Predicted inadequate energy intake related to  (current medical condition) as evidenced by intake 26-50%    Nutrition Interventions:   Food and/or Nutrient Delivery: Continue current diet,Start oral nutrition supplement     Coordination of Nutrition Care: Continue to monitor while inpatient  Plan of Care discussed with Nikky Santos RN    Goals: Active Goal: Meet >75% of estimated needs at time of nutrition follow up. Nutrition Monitoring and Evaluation:      Food/Nutrient Intake Outcomes: Food and nutrient intake,Supplement intake  Physical Signs/Symptoms Outcomes: Biochemical data,Hemodynamic status,Meal time behavior,Weight    Discharge Planning:     Too soon to determine    Jaxon Vizcaino [address], [address] [place] [address], LD, [address]      Disaster Mode Active

## 2022-02-04 NOTE — PROGRESS NOTES
ACUTE OT GOALS:  (Developed with and agreed upon by patient and/or caregiver.)  1) Patient will complete upper body bathing and dressing with SET UP and adaptive equipment as needed. 2) Patient will complete lower body bathing and dressing with SBA and adaptive equipment as needed. 2) Patient will complete toileting with SBA. 3) Patient will complete functional transfers with SUPERVISION and adaptive equipment as needed. 4) Patient will tolerate at least 25 minutes of OT activity with 1-2 rest breaks while maintaining O2 sats >90%. 5) Patient will verbalize at least 3 energy conservation technique to utilize during ADL/IADL. Timeframe: 7 visits       OCCUPATIONAL THERAPY: Daily Note OT Treatment Day # 2    Emma Whitaker is a 80 y.o. female   PRIMARY DIAGNOSIS: Acute hypoxemic respiratory failure due to COVID-19 [facility])  [facility] hypox[facility] [U07.1, J96.01]       Payor: [employer] MEDICARE / Plan: [address] / Product Type: [employer] Medicare /   ASSESSMENT:     REHAB RECOMMENDATIONS: CURRENT LEVEL OF FUNCTION:  (Most Recently Demonstrated)   Recommendation to date pending progress:  [address] [place] [address] Therapy  Equipment:    To Be Determined Bathing:   Not tested  Dressing:   Not tested  Feeding/Grooming:   Moderate Assistance to wash hair with shampoo cap  Toileting:   Not tested  Functional Mobility:   Contact Guard Assistance-Min A     ASSESSMENT:  Ms. Jama Smith is doing fair today. Pt presents supine upon arrival. Pt required min A for bed mobility. Pt demonstrates fair sitting balance at EOB. Pt was able to transfer over to chair with CGA/min A. Pt was assisted with grooming tasks while seated in chair. Pt appreciative of the assistance. Later assisted patient back to bed with same amount of assistance. Some progress made today. Will continue to benefit from skilled OT during stay.      SUBJECTIVE:   Ms. Jama Smith states, \"Thank you all\"    SOCIAL HISTORY/LIVING ENVIRONMENT:   Support Systems: Spouse/Significant Other,Child(sky)    OBJECTIVE:     PAIN: VITAL SIGNS: LINES/DRAINS:   Pre Treatment: Pain Screen  Pain Scale 1: Numeric (0 - 10)  Pain Intensity 1: 0  Post Treatment: 0   Purewick  O2 Device: Heated,Hi flow nasal cannula     ACTIVITIES OF DAILY LIVING: I Mod I S SBA CGA Min Mod Max Total NT Comments   BASIC ADLs:              Bathing/ Showering [] [] [] [] [] [] [] [] [] [x]    Toileting [] [] [] [] [] [] [] [] [] [x]    Dressing [] [] [] [] [] [] [] [] [] [x]    Feeding [] [] [] [] [] [] [] [] [] [x]    Grooming [] [] [] [] [] [] [x] [] [] []    Personal Device Care [] [] [] [] [] [] [] [] [] [x]    Functional Mobility [] [] [] [] [x] [x] [] [] [] []    I=Independent, Mod I=Modified Independent, S=Supervision, SBA=Standby Assistance, CGA=Contact Guard Assistance,   Min=Minimal Assistance, Mod=Moderate Assistance, Max=Maximal Assistance, Total=Total Assistance, NT=Not Tested    MOBILITY: I Mod I S SBA CGA Min Mod Max Total  NT x2 Comments:   Supine to sit [] [] [] [] [x] [x] [] [] [] [] []    Sit to supine [] [] [] [] [x] [] [] [] [] [] []    Sit to stand [] [] [] [] [x] [x] [] [] [] [] []    Bed to chair [] [] [] [] [x] [x] [] [] [] [] []    I=Independent, Mod I=Modified Independent, S=Supervision, SBA=Standby Assistance, CGA=Contact Guard Assistance,   Min=Minimal Assistance, Mod=Moderate Assistance, Max=Maximal Assistance, Total=Total Assistance, NT=Not Tested    BALANCE: Good Fair+ Fair Fair- Poor NT Comments   Sitting Static [] [x] [] [] [] []    Sitting Dynamic [] [x] [] [] [] []              Standing Static [] [] [x] [] [] []    Standing Dynamic [] [] [x] [] [] []      PLAN:   FREQUENCY/DURATION: OT Plan of Care: 3 times/week for duration of hospital stay or until stated goals are met, whichever comes first.    TREATMENT:   TREATMENT:   ($$ Self Care/Home Management: 8-22 mins$$ Neuromuscular Re-Education: 8-22 mins )  Self Care (10 Minutes): Self care including Grooming to increase independence and decrease level of assistance required. Neuromuscular Re-education (15 Minutes): Neuromuscular Re-education included Balance Training, Coordination training, Postural training, Sitting balance training and Standing balance training to improve Balance, Coordination, Functional Mobility and Postural Control.     TREATMENT GRID:  N/A    AFTER TREATMENT POSITION/PRECAUTIONS:  Bed, Needs within reach and RN notified    INTERDISCIPLINARY COLLABORATION:  RN/PCT, PT/PTA and OT/ROMERO    TOTAL TREATMENT DURATION:  OT Patient Time In/Time Out  Time In: 1420  Time Out: 601 [address] SALLY Hernández

## 2022-02-04 NOTE — PROGRESS NOTES
Flow decreased from 50LPM to 40LPM, and FIO2 Decreased from 85% to 65%. No complications noted.      Ronald Leggett, RRT

## 2022-02-04 NOTE — PROGRESS NOTES
Hospitalist Progress Note   Admit Date:  2022 12:32 PM   Name:  Patricia Davalos   Age:  80 y.o. Sex:  female  :  1935   MRN:  [id number]   Room:  [facility]    Presenting Complaint: Positive For Covid-19    Reason(s) for Admission: Acute hypoxemic respiratory failure due to COVID-19 [facility]) [U07.1, J96.01]     Hospital Course & Interval History:   Bradorismukesh Gil a 80 y.o. female with medical history of HTN, mixed HLD, GERD, Glaucoma, Hay fever,  HLD, insomnia who presented from home via EMS with hypoxia and AMS with recent diagnosis of COVID-19. EMS reports patient was hypoxic on arrival, O2 saturation in the 60s.  Placed on 6 L and given terbutaline.  EMS also noted family some concern for patient being more altered than normal.     In ED, T102 BP 91/56    spo2 74% RA, 85% 6L NC, 91% 55L/m  LA 3.9 CRP 17.3 D dimer 3.85 NT pro BNP 3481 HS trop 1544>1609   CXR BL infiltrates   rec'd Tylenol 1000 mg and decadron 10 mg IV      EKG shows sinus tachycardia, rate 115, , QRS 92, QTc 491     She is alert and oriented to person, place  but not month or year. Unable to get in touch with either son or . She notes symptom onset 5 days ago. Patient denies CP or palpitations, nausea, vomiting, diarrhea, fevers. +chills, +cough. Says  is sick. Discussed Actemra with patient including risks and benefits.      satting 94% on 55/90% airvo.     Subjective (22):  2/3/2022  Wants to go home,pt on fio2 80%  2022  Complaining of being anxious, decreased sleep manic depressed, no suicidal ideation, on Airvo with FiO2 of 85%, oxygen saturation ranging from 95 to 100%.     Assessment & Plan:   Acute Hypoxemic Respiratory Failure 2/2 COVID-19 PNA   Patient is saturating well on a low 50 L/MIN  Suspected Cytokine Storm with elevated markers   - COVID +  ,  Unvaccinated   - Decadron 6 mg x 10 doses   -Continue Bacitricininb  2/3/2022- on airvo at 80% fio2  2022-on Airvo at 85% FiO2, probably can wean as oxygen saturation ranging from 95 -100%    uti- e.coli- on rocephin    Insomnia/anxiety/depression  No suicidal ideation  We will start on Lexapro and melatonin        New onset Afib  EKG showed A. fib with RVR with HR 170s  Troponins were elevated and down trended  Lilian vasc score of 4 and stroke risk of 4.8% per yr  Continue  metoprolol  Continue Eliquis   echo with normal EF   Cardiology signed off  -    Sepsis 2/2 COVID -   Resolved   admin 30 cc/kg bolus. 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 Pro calcitonin elevated 1.15  Ct chest showed covid and no PE         Troponemia  Cardiac enzymes downtrending. EKG does not demonstrate ST segment elevation. Cardiology consulted Hypercoagulable state associated with COVID-19 -      Hypokalemia // Hypomagnesemia - associated with prolonged QTc   Resolved     Acute Encephalopathy -  Resolved        Chronic insomnia - takes temazepam 30 mg nightly for this. At risk of withdrawal. Reduce dose to 15 mg as needed.      Lumbar spondylosis - f/b pain management. Takes gabapentin 800 mg BID, tramadol 100 mg BID.  Reduce gabapentin 300 mg TID prn, hold tramadol for now.         HLD - continue  atorvastatin      H/o HTN - Normotensive Monitor      GERD - resume protonix.           Diet:  ADULT DIET Easy to Chew; Low Fat/Low Chol/High Fiber/EVA  DVT PPx: Eliquis  Code status: Full Code                Hospital Problems as of 2/4/2022 Date Reviewed: 4/12/2018          Codes Class Noted - Resolved POA    UTI (urinary tract infection) ICD-10-CM: N39.0  ICD-9-CM: 599.0  2/3/2022 - Present Unknown        * (Principal) [facility]) ICD-10-CM: U07.1, J96.01  ICD-9-CM: 518.81, 079.89, 799.02  1/26/2022 - Present Yes        [facility] [place] [address]) ICD-10-CM: U07.1, A41.89  ICD-9-CM: 079.89, 995.91  1/26/2022 - Present Yes        Hypomagnesemia ICD-10-CM: E83.42  ICD-9-CM: 275.2  1/26/2022 - Present Yes        Hypokalemia ICD-10-CM: E87.6  ICD-9-CM: 276.8  1/26/2022 - Present Yes        Prolonged Q-T interval on ECG ICD-10-CM: R94.31  ICD-9-CM: 794.31  1/26/2022 - Present Yes        Elevated troponin ICD-10-CM: R77.8  ICD-9-CM: 790.6  1/26/2022 - Present Yes        Toxic metabolic encephalopathy DARRICK-60-KR: G92.8  ICD-9-CM: 349.82  1/26/2022 - Present Yes        Chronic prescription benzodiazepine use ICD-10-CM: Z79.899  ICD-9-CM: V58.69  1/26/2022 - Present Yes        Hypercoagulable state associated with COVID-19 [facility]) ICD-10-CM: U07.1, D68.69  ICD-9-CM: 289.82, 079.89  1/26/2022 - Present Yes        Transaminitis ICD-10-CM: R74.01  ICD-9-CM: 790.4  1/26/2022 - Present Yes        Gastroesophageal reflux disease without esophagitis ICD-10-CM: K21.9  ICD-9-CM: 530.81  9/28/2017 - Present Yes    Overview Signed 1/9/2018 10:12 AM by Deshaun Anderson DO     Last Assessment & Plan:   Stable. Essential (primary) hypertension ICD-10-CM: I10  ICD-9-CM: 401.9  10/28/2016 - Present Yes    Overview Signed 1/9/2018 10:12 AM by Deshaun Anderson DO     Last Assessment & Plan:   Stable. Hyperlipidemia ICD-10-CM: E78.5  ICD-9-CM: 272.4  10/28/2016 - Present Yes    Overview Signed 1/9/2018 10:12 AM by Deshaun Anderson DO     Last Assessment & Plan:   Return for fasting labs. Insomnia, persistent ICD-10-CM: G47.00  ICD-9-CM: 307.42  4/27/2016 - Present Yes    Overview Signed 1/9/2018 10:12 AM by Deshaun Anderson DO     Last Assessment & Plan:   Restoril prescription printed & given. Primary osteoarthritis involving multiple joints ICD-10-CM: M89.49  ICD-9-CM: 715.98  7/31/2015 - Present Yes    Overview Signed 1/9/2018 10:12 AM by Deshaun Anderson DO     Last Assessment & Plan:   3 Norco scripts printed with do not fill before dates of 10/10, 11/9, 12/9.  Tramadol prescription printed & given (decreased from 150 to 120 tablets per month based on how patient is taking it). Aware of risk of sedation and aware to get prescriptions only in our office. Discussed polypharmacy & fall risk with patient's multiple sedating medications, encouraged to limit use as much as possible. Will have her see Dr. Contreras Due for next follow up to review pain medication regimen. Pt verbalized understanding, agreed with plan. Objective:     Patient Vitals for the past 24 hrs:   Temp Pulse Resp BP SpO2   02/04/22 1526 -- -- -- 95/65 --   02/04/22 1512 97.4 °F (36.3 °C) (!) 59 20 (!) 86/68 98 %   02/04/22 1348 -- -- -- -- 97 %   02/04/22 1153 -- -- -- -- 98 %   02/04/22 1120 98.7 °F (37.1 °C) (!) 58 20 (!) 105/57 98 %   02/04/22 1031 -- -- -- -- 99 %   02/04/22 0727 97.4 °F (36.3 °C) 72 20 129/72 99 %   02/04/22 0306 97.7 °F (36.5 °C) (!) 52 18 (!) 146/70 94 %   02/04/22 0300 -- (!) 48 -- -- --   02/03/22 2348 -- 68 -- -- --   02/03/22 2311 97.4 °F (36.3 °C) 70 21 (!) 98/57 98 %   02/03/22 2059 -- -- -- -- 95 %   02/03/22 2051 -- 60 -- -- --   02/03/22 1956 98.2 °F (36.8 °C) 62 21 103/77 96 %     Oxygen Therapy  O2 Sat (%): 98 % (02/04/22 1512)  Pulse via Oximetry: 61 beats per minute (02/04/22 1348)  O2 Device: Heated; Hi flow nasal cannula (02/04/22 1348)  Skin Assessment: Clean, dry, & intact (02/04/22 0727)  O2 Flow Rate (L/min): 40 l/min (02/04/22 1348)  O2 Temperature: 87.8 °F (31 °C) (02/01/22 2115)  FIO2 (%): 50 % (FiO2 decreased ) (02/04/22 1348)    Estimated body mass index is 27.62 kg/m² as calculated from the following:    Height as of this encounter: 5' 1\" (1.549 m). Weight as of this encounter: 66.3 kg (146 lb 3.2 oz). Intake/Output Summary (Last 24 hours) at 2/4/2022 1626  Last data filed at 2/4/2022 0900  Gross per 24 hour   Intake 240 ml   Output --   Net 240 ml         Physical Exam:     Blood pressure 95/65, pulse (!) 59, temperature 97.4 °F (36.3 °C), resp.  rate 20, height 5' 1\" (1.549 m), weight 66.3 kg (146 lb 3.2 oz), SpO2 98 %. General:    Well nourished. Mild resp distress on 85% fio2,airvo  Head:  Normocephalic, atraumatic  Eyes:  Sclerae appear normal.  Pupils equally round. ENT:  Nares appear normal, no drainage. Moist oral mucosa  Neck:  No restricted ROM. Trachea midline   CV:   RRR. No m/r/g. No jugular venous distension. Lungs:   Bilateral coarse breath sounds  Abdomen: Bowel sounds present. Soft, nontender, nondistended. Extremities: No cyanosis or clubbing. No edema  Skin:     No rashes and normal coloration. Warm and dry. Neuro:  CN II-XII grossly intact. Sensation intact. A&Ox3  Psych:  Normal mood and affect. I have reviewed ordered lab tests and independently visualized imaging below:    Recent Labs:  Recent Results (from the past 48 hour(s))   CBC WITH AUTOMATED DIFF    Collection Time: 02/03/22  4:09 AM   Result Value Ref Range    WBC 9.1 4.3 - 11.1 K/uL    RBC 3.88 (L) 4.05 - 5.2 M/uL    HGB 11.2 (L) 11.7 - 15.4 g/dL    HCT 34.6 (L) 35.8 - 46.3 %    MCV 89.2 79.6 - 97.8 FL    MCH 28.9 26.1 - 32.9 PG    MCHC 32.4 31.4 - 35.0 g/dL    RDW 13.1 11.9 - 14.6 %    PLATELET 781 987 - 859 K/uL    MPV 10.1 9.4 - 12.3 FL    ABSOLUTE NRBC 0.00 0.0 - 0.2 K/uL    DF AUTOMATED      NEUTROPHILS 71 43 - 78 %    LYMPHOCYTES 13 13 - 44 %    MONOCYTES 13 (H) 4.0 - 12.0 %    EOSINOPHILS 0 (L) 0.5 - 7.8 %    BASOPHILS 0 0.0 - 2.0 %    IMMATURE GRANULOCYTES 2 0.0 - 5.0 %    ABS. NEUTROPHILS 6.5 1.7 - 8.2 K/UL    ABS. LYMPHOCYTES 1.2 0.5 - 4.6 K/UL    ABS. MONOCYTES 1.2 0.1 - 1.3 K/UL    ABS. EOSINOPHILS 0.0 0.0 - 0.8 K/UL    ABS. BASOPHILS 0.0 0.0 - 0.2 K/UL    ABS. IMM.  GRANS. 0.2 0.0 - 0.5 K/UL   METABOLIC PANEL, BASIC    Collection Time: 02/03/22  4:09 AM   Result Value Ref Range    Sodium 140 136 - 145 mmol/L    Potassium 3.7 3.5 - 5.1 mmol/L    Chloride 104 98 - 107 mmol/L    CO2 32 21 - 32 mmol/L    Anion gap 4 (L) 7 - 16 mmol/L    Glucose 110 (H) 65 - 100 mg/dL    BUN 29 (H) 8 - 23 MG/DL Creatinine 0.70 0.6 - 1.0 MG/DL    GFR est AA >60 >60 ml/min/1.73m2    GFR est non-AA >60 >60 ml/min/1.73m2    Calcium 8.5 8.3 - 04.1 MG/DL   METABOLIC PANEL, COMPREHENSIVE    Collection Time: 02/04/22  5:00 AM   Result Value Ref Range    Sodium 139 136 - 145 mmol/L    Potassium 4.0 3.5 - 5.1 mmol/L    Chloride 102 98 - 107 mmol/L    CO2 34 (H) 21 - 32 mmol/L    Anion gap 3 (L) 7 - 16 mmol/L    Glucose 105 (H) 65 - 100 mg/dL    BUN 33 (H) 8 - 23 MG/DL    Creatinine 0.70 0.6 - 1.0 MG/DL    GFR est AA >60 >60 ml/min/1.73m2    GFR est non-AA >60 >60 ml/min/1.73m2    Calcium 8.7 8.3 - 10.4 MG/DL    Bilirubin, total 0.4 0.2 - 1.1 MG/DL    ALT (SGPT) 25 12 - 65 U/L    AST (SGOT) 25 15 - 37 U/L    Alk. phosphatase 70 50 - 136 U/L    Protein, total 5.7 (L) 6.3 - 8.2 g/dL    Albumin 2.6 (L) 3.2 - 4.6 g/dL    Globulin 3.1 2.3 - 3.5 g/dL    A-G Ratio 0.8 (L) 1.2 - 3.5         All Micro Results     Procedure Component Value Units Date/Time    CULTURE, URINE [[id number]]  (Abnormal)  (Susceptibility) Collected: 01/28/22 1105    Order Status: Completed Specimen: Urine from Clean catch Updated: 01/30/22 0840     Special Requests: NO SPECIAL REQUESTS        Culture result:       >100,000 COLONIES/mL ESCHERICHIA COLI                  <1,000 CFU/ML MIXED SKIN SCOTT ISOLATED          COVID-19 RAPID TEST [[id number]]  (Abnormal) Collected: 01/26/22 1258    Order Status: Completed Specimen: Nasopharyngeal Updated: 01/26/22 1316     Specimen source NASAL        COVID-19 rapid test Detected        Comment:      The specimen is POSITIVE for SARS-CoV-2, the novel coronavirus associated with COVID-19. This test has been authorized by the FDA under an Emergency Use Authorization (EUA) for use by authorized laboratories.         Fact sheet for Healthcare Providers: ConventionUpdate.co.nz  Fact sheet for Patients: ConventionUpdate.co.nz       Methodology: Isothermal Nucleic Acid Amplification Other Studies:  No results found.     Current Meds:  Current Facility-Administered Medications   Medication Dose Route Frequency    escitalopram oxalate (LEXAPRO) tablet 5 mg  5 mg Oral QPM    melatonin tablet 5 mg  5 mg Oral QHS    baricitinib (OLUMIANT) tablet 4 mg  4 mg Oral DAILY    lip protectant (BLISTEX) ointment 1 Each  1 Each Topical PRN    cefTRIAXone (ROCEPHIN) 1 g in 0.9% sodium chloride (MBP/ADV) 50 mL MBP  1 g IntraVENous Q24H    apixaban (ELIQUIS) tablet 5 mg  5 mg Oral BID    metoprolol tartrate (LOPRESSOR) tablet 50 mg  50 mg Oral Q12H    traMADoL (ULTRAM) tablet 100 mg  100 mg Oral Q8H PRN    dexlansoprazole (DEXILANT) capsule (delayed release) CpDB 60 mg (Patient Supplied)  60 mg Oral DAILY    sodium chloride (NS) flush 5-10 mL  5-10 mL IntraVENous Q8H    sodium chloride (NS) flush 5-10 mL  5-10 mL IntraVENous PRN    sodium chloride (NS) flush 5-40 mL  5-40 mL IntraVENous Q8H    sodium chloride (NS) flush 5-40 mL  5-40 mL IntraVENous PRN    acetaminophen (TYLENOL) tablet 650 mg  650 mg Oral Q6H PRN    Or    acetaminophen (TYLENOL) suppository 650 mg  650 mg Rectal Q6H PRN    polyethylene glycol (MIRALAX) packet 17 g  17 g Oral DAILY PRN    ondansetron (ZOFRAN ODT) tablet 4 mg  4 mg Oral Q8H PRN    Or    ondansetron (ZOFRAN) injection 4 mg  4 mg IntraVENous Q6H PRN    guaiFENesin-dextromethorphan (ROBITUSSIN DM) 100-10 mg/5 mL syrup 5 mL  5 mL Oral Q4H PRN    cholecalciferol (VITAMIN D3) (1000 Units /25 mcg) tablet 2,000 Units  2,000 Units Oral DAILY    alum-mag hydroxide-simeth (MYLANTA) oral suspension 15 mL  15 mL Oral Q6H PRN    albuterol (PROVENTIL HFA, VENTOLIN HFA, PROAIR HFA) inhaler 2 Puff  2 Puff Inhalation Q4H PRN    atorvastatin (LIPITOR) tablet 40 mg  40 mg Oral QHS    gabapentin (NEURONTIN) capsule 300 mg  300 mg Oral TID PRN    temazepam (RESTORIL) capsule 15 mg  15 mg Oral QHS PRN       Signed:  Darcy Obando MD

## 2022-02-04 NOTE — PROGRESS NOTES
FiO2 decreased from 65% to 50% at this time on the optiflow. No complications noted.      Philbert Reasons, RRT

## 2022-02-05 LAB
ALBUMIN SERPL-MCNC: 2.7 G/DL (ref 3.2–4.6)
ALBUMIN/GLOB SERPL: 0.9 {RATIO} (ref 1.2–3.5)
ALP SERPL-CCNC: 78 U/L (ref 50–136)
ALT SERPL-CCNC: 24 U/L (ref 12–65)
ANION GAP SERPL CALC-SCNC: 3 MMOL/L (ref 7–16)
AST SERPL-CCNC: 24 U/L (ref 15–37)
BILIRUB SERPL-MCNC: 0.4 MG/DL (ref 0.2–1.1)
BUN SERPL-MCNC: 30 MG/DL (ref 8–23)
CALCIUM SERPL-MCNC: 8.9 MG/DL (ref 8.3–10.4)
CHLORIDE SERPL-SCNC: 103 MMOL/L (ref 98–107)
CO2 SERPL-SCNC: 32 MMOL/L (ref 21–32)
CREAT SERPL-MCNC: 0.7 MG/DL (ref 0.6–1)
GLOBULIN SER CALC-MCNC: 3.1 G/DL (ref 2.3–3.5)
GLUCOSE SERPL-MCNC: 121 MG/DL (ref 65–100)
POTASSIUM SERPL-SCNC: 4.2 MMOL/L (ref 3.5–5.1)
PROT SERPL-MCNC: 5.8 G/DL (ref 6.3–8.2)
SODIUM SERPL-SCNC: 138 MMOL/L (ref 136–145)

## 2022-02-05 PROCEDURE — 74011250636 HC RX REV CODE- 250/636: Performed by: STUDENT IN AN ORGANIZED HEALTH CARE EDUCATION/TRAINING PROGRAM

## 2022-02-05 PROCEDURE — 74011000258 HC RX REV CODE- 258: Performed by: STUDENT IN AN ORGANIZED HEALTH CARE EDUCATION/TRAINING PROGRAM

## 2022-02-05 PROCEDURE — 74011000250 HC RX REV CODE- 250: Performed by: STUDENT IN AN ORGANIZED HEALTH CARE EDUCATION/TRAINING PROGRAM

## 2022-02-05 PROCEDURE — 74011250637 HC RX REV CODE- 250/637: Performed by: STUDENT IN AN ORGANIZED HEALTH CARE EDUCATION/TRAINING PROGRAM

## 2022-02-05 PROCEDURE — 74011250637 HC RX REV CODE- 250/637: Performed by: FAMILY MEDICINE

## 2022-02-05 PROCEDURE — 74011250637 HC RX REV CODE- 250/637: Performed by: NURSE PRACTITIONER

## 2022-02-05 PROCEDURE — 36415 COLL VENOUS BLD VENIPUNCTURE: CPT

## 2022-02-05 PROCEDURE — 80053 COMPREHEN METABOLIC PANEL: CPT

## 2022-02-05 PROCEDURE — 74011250637 HC RX REV CODE- 250/637: Performed by: INTERNAL MEDICINE

## 2022-02-05 PROCEDURE — 74011000250 HC RX REV CODE- 250: Performed by: FAMILY MEDICINE

## 2022-02-05 PROCEDURE — 65660000000 HC RM CCU STEPDOWN

## 2022-02-05 RX ORDER — ALPRAZOLAM 0.5 MG/1
0.25 TABLET ORAL
Status: DISCONTINUED | OUTPATIENT
Start: 2022-02-05 | End: 2022-02-06

## 2022-02-05 RX ADMIN — DEXLANSOPRAZOLE 60 MG: 60 CAPSULE, DELAYED RELEASE ORAL at 18:17

## 2022-02-05 RX ADMIN — APIXABAN 5 MG: 5 TABLET, FILM COATED ORAL at 08:23

## 2022-02-05 RX ADMIN — ATORVASTATIN CALCIUM 40 MG: 40 TABLET, FILM COATED ORAL at 21:23

## 2022-02-05 RX ADMIN — METOPROLOL TARTRATE 50 MG: 50 TABLET, FILM COATED ORAL at 08:23

## 2022-02-05 RX ADMIN — BARICITINIB 4 MG: 2 TABLET, FILM COATED ORAL at 08:23

## 2022-02-05 RX ADMIN — TEMAZEPAM 15 MG: 15 CAPSULE ORAL at 21:23

## 2022-02-05 RX ADMIN — ALPRAZOLAM 0.25 MG: 0.5 TABLET ORAL at 21:22

## 2022-02-05 RX ADMIN — ALUMINUM HYDROXIDE, MAGNESIUM HYDROXIDE, AND SIMETHICONE 15 ML: 200; 200; 20 SUSPENSION ORAL at 18:09

## 2022-02-05 RX ADMIN — ESCITALOPRAM OXALATE 5 MG: 10 TABLET ORAL at 17:36

## 2022-02-05 RX ADMIN — SODIUM CHLORIDE, PRESERVATIVE FREE 10 ML: 5 INJECTION INTRAVENOUS at 05:02

## 2022-02-05 RX ADMIN — SODIUM CHLORIDE, PRESERVATIVE FREE 10 ML: 5 INJECTION INTRAVENOUS at 20:37

## 2022-02-05 RX ADMIN — Medication 5 MG: at 21:23

## 2022-02-05 RX ADMIN — SODIUM CHLORIDE, PRESERVATIVE FREE 10 ML: 5 INJECTION INTRAVENOUS at 20:36

## 2022-02-05 RX ADMIN — SODIUM CHLORIDE, PRESERVATIVE FREE 5 ML: 5 INJECTION INTRAVENOUS at 17:36

## 2022-02-05 RX ADMIN — VITAMIN D, TAB 1000IU (100/BT) 2000 UNITS: 25 TAB at 08:23

## 2022-02-05 RX ADMIN — APIXABAN 5 MG: 5 TABLET, FILM COATED ORAL at 21:23

## 2022-02-05 RX ADMIN — CEFTRIAXONE 1 G: 1 INJECTION, POWDER, FOR SOLUTION INTRAMUSCULAR; INTRAVENOUS at 08:24

## 2022-02-05 NOTE — PROGRESS NOTES
Hospitalist Progress Note   Admit Date:  2022 12:32 PM   Name:  Aliya Lion   Age:  80 y.o. Sex:  female  :  1935   MRN:  [id number]   Room:  [facility]/    Presenting Complaint: Positive For Covid-19    Reason(s) for Admission: Acute hypoxemic respiratory failure due to COVID-19 [facility]) [U07.1, J96.01]     Hospital Course & Interval History:   Shell Guillen a 80 y.o. female with medical history of HTN, mixed HLD, GERD, Glaucoma, Hay fever,  HLD, insomnia who presented from home via EMS with hypoxia and AMS with recent diagnosis of COVID-19. EMS reports patient was hypoxic on arrival, O2 saturation in the 60s.  Placed on 6 L and given terbutaline.  EMS also noted family some concern for patient being more altered than normal.     In ED, T102 BP 91/56    spo2 74% RA, 85% 6L NC, 91% 55L/m  LA 3.9 CRP 17.3 D dimer 3.85 NT pro BNP 3481 HS trop 1544>1609   CXR BL infiltrates   rec'd Tylenol 1000 mg and decadron 10 mg IV      EKG shows sinus tachycardia, rate 115, , QRS 92, QTc 491     She is alert and oriented to person, place  but not month or year. Unable to get in touch with either son or . She notes symptom onset 5 days ago. Patient denies CP or palpitations, nausea, vomiting, diarrhea, fevers. +chills, +cough. Says  is sick. Discussed Actemra with patient including risks and benefits.      satting 94% on 55/90% airvo.     Subjective (22):  2/3/2022  Wants to go home,pt on fio2 80%  2022  Complaining of being anxious, decreased sleep manic depressed, no suicidal ideation, on Airvo with FiO2 of 85%, oxygen saturation ranging from 95 to 100%. 22  On Airvo FiO2 55%, says breathing better, did not sleep at bed intermittently last night, spoke to son on phone. Wants to go home.     Assessment & Plan:   Acute Hypoxemic Respiratory Failure 2/ COVID-19 PNA   Patient is saturating well on a low 50 L/MIN  Suspected Cytokine Storm with elevated markers   - COVID + 1/16 ,  Unvaccinated   - Decadron 6 mg x 10 doses   -Continue Bacitricininb  2/3/2022- on airvo at 80% fio2  2/4/2022-on Airvo at 85% FiO2, probably can wean as oxygen saturation ranging from 95 -100%  2/5/22-on Airvo, FiO2 55%    uti- e.coli- on rocephin    Insomnia/anxiety/depression  No suicidal ideation  We will start on Lexapro and melatonin  2/5/22-we will add as needed Xanax 25 mg nightly        New onset Afib  EKG showed A. fib with RVR with HR 170s  Troponins were elevated and down trended  Lilian vasc score of 4 and stroke risk of 4.8% per yr  Continue  metoprolol  Continue Eliquis   echo with normal EF   Cardiology signed off  -    Sepsis 2/2 COVID -   Resolved   admin 30 cc/kg bolus. 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 Pro calcitonin elevated 1.15  Ct chest showed covid and no PE         Troponemia  Cardiac enzymes downtrending. EKG does not demonstrate ST segment elevation. Cardiology consulted Hypercoagulable state associated with COVID-19 -      Hypokalemia // Hypomagnesemia - associated with prolonged QTc   Resolved     Acute Encephalopathy -  Resolved        Chronic insomnia - takes temazepam 30 mg nightly for this. At risk of withdrawal. Reduce dose to 15 mg as needed.      Lumbar spondylosis - f/b pain management. Takes gabapentin 800 mg BID, tramadol 100 mg BID.  Reduce gabapentin 300 mg TID prn, hold tramadol for now.         HLD - continue  atorvastatin      H/o HTN - Normotensive Monitor      GERD - resume protonix.           Diet:  ADULT DIET Easy to Chew; Low Fat/Low Chol/High Fiber/EVA  DVT PPx: Eliquis  Code status: Full Code                Hospital Problems as of 2/5/2022 Date Reviewed: 4/12/2018          Codes Class Noted - Resolved POA    UTI (urinary tract infection) ICD-10-CM: N39.0  ICD-9-CM: 599.0  2/3/2022 - Present Unknown        * (Principal) Acute hypoxemic respiratory failure due to COVID-19 ([facility] [address] ICD-10-CM: U07.1, J96.01  ICD-9-CM: 518.81, 079.89, 799.02  1/26/2022 - Present Yes        [facility] [place] [address]) ICD-10-CM: U07.1, A41.89  ICD-9-CM: 079.89, 995.91  1/26/2022 - Present Yes        Hypomagnesemia ICD-10-CM: E83.42  ICD-9-CM: 275.2  1/26/2022 - Present Yes        Hypokalemia ICD-10-CM: E87.6  ICD-9-CM: 276.8  1/26/2022 - Present Yes        Prolonged Q-T interval on ECG ICD-10-CM: R94.31  ICD-9-CM: 794.31  1/26/2022 - Present Yes        Elevated troponin ICD-10-CM: R77.8  ICD-9-CM: 790.6  1/26/2022 - Present Yes        Toxic metabolic encephalopathy JZA-89-ED: G92.8  ICD-9-CM: 349.82  1/26/2022 - Present Yes        Chronic prescription benzodiazepine use ICD-10-CM: Z79.899  ICD-9-CM: V58.69  1/26/2022 - Present Yes        Hypercoagulable state associated with [facility] [place] [address]) ICD-10-CM: U07.1, D68.69  ICD-9-CM: 289.82, 079.89  1/26/2022 - Present Yes        Transaminitis ICD-10-CM: R74.01  ICD-9-CM: 790.4  1/26/2022 - Present Yes        Gastroesophageal reflux disease without esophagitis ICD-10-CM: K21.9  ICD-9-CM: 530.81  9/28/2017 - Present Yes    Overview Signed 1/9/2018 10:12 AM by Sammie Luna DO     Last Assessment & Plan:   Stable. Essential (primary) hypertension ICD-10-CM: I10  ICD-9-CM: 401.9  10/28/2016 - Present Yes    Overview Signed 1/9/2018 10:12 AM by Sammie Luna DO     Last Assessment & Plan:   Stable. Hyperlipidemia ICD-10-CM: E78.5  ICD-9-CM: 272.4  10/28/2016 - Present Yes    Overview Signed 1/9/2018 10:12 AM by Sammie Luna DO     Last Assessment & Plan:   Return for fasting labs. Insomnia, persistent ICD-10-CM: G47.00  ICD-9-CM: 307.42  4/27/2016 - Present Yes    Overview Signed 1/9/2018 10:12 AM by Sammie Luna DO     Last Assessment & Plan:   Restoril prescription printed & given.              Primary osteoarthritis involving multiple joints ICD-10-CM: M89.49  ICD-9-CM: 715.98  7/31/2015 - Present Yes Overview Signed 1/9/2018 10:12 AM by Manohar Arthur DO     Last Assessment & Plan:   3 Norco scripts printed with do not fill before dates of 10/10, 11/9, 12/9. Tramadol prescription printed & given (decreased from 150 to 120 tablets per month based on how patient is taking it). Aware of risk of sedation and aware to get prescriptions only in our office. Discussed polypharmacy & fall risk with patient's multiple sedating medications, encouraged to limit use as much as possible. Will have her see Dr. Jude Montgomery for next follow up to review pain medication regimen. Pt verbalized understanding, agreed with plan. Objective:     Patient Vitals for the past 24 hrs:   Temp Pulse Resp BP SpO2   02/05/22 1445 98.8 °F (37.1 °C) 61 20 110/66 96 %   02/05/22 1110 99.3 °F (37.4 °C) 60 20 104/65 95 %   02/05/22 0729 98.5 °F (36.9 °C) (!) 58 20 124/74 93 %   02/05/22 0422 -- (!) 58 -- -- --   02/05/22 0350 98.2 °F (36.8 °C) (!) 53 -- 128/79 98 %   02/04/22 2300 -- (!) 56 -- -- --   02/04/22 2238 98.4 °F (36.9 °C) 60 20 102/61 92 %   02/04/22 2011 -- 60 -- -- --   02/04/22 1930 98.5 °F (36.9 °C) 67 18 (!) 93/59 94 %   02/04/22 1913 98.5 °F (36.9 °C) 61 -- (!) 89/54 95 %     Oxygen Therapy  O2 Sat (%): 96 % (02/05/22 1445)  Pulse via Oximetry: 61 beats per minute (02/04/22 1348)  O2 Device: Heated; Hi flow nasal cannula (02/05/22 1445)  Skin Assessment: Clean, dry, & intact (02/04/22 0723)  O2 Flow Rate (L/min): 35 l/min (02/05/22 1445)  O2 Temperature: 87.8 °F (31 °C) (02/01/22 2115)  FIO2 (%): 55 % (02/05/22 1445)    Estimated body mass index is 27.62 kg/m² as calculated from the following:    Height as of this encounter: 5' 1\" (1.549 m). Weight as of this encounter: 66.3 kg (146 lb 3.2 oz).     Intake/Output Summary (Last 24 hours) at 2/5/2022 1554  Last data filed at 2/5/2022 0824  Gross per 24 hour   Intake 480 ml   Output 700 ml   Net -220 ml         Physical Exam:     Blood pressure 110/66, pulse 61, temperature 98.8 °F (37.1 °C), resp. rate 20, height 5' 1\" (1.549 m), weight 66.3 kg (146 lb 3.2 oz), SpO2 96 %. General:    Well nourished. Mild resp distress on 55% fio2,airvo  Head:  Normocephalic, atraumatic  Eyes:  Sclerae appear normal.  Pupils equally round. ENT:  Nares appear normal, no drainage. Moist oral mucosa  Neck:  No restricted ROM. Trachea midline   CV:   RRR. No m/r/g. No jugular venous distension. Lungs:   Bilateral coarse breath sounds  Abdomen: Bowel sounds present. Soft, nontender, nondistended. Extremities: No cyanosis or clubbing. No edema  Skin:     No rashes and normal coloration. Warm and dry. Neuro:  CN II-XII grossly intact. Sensation intact. A&Ox3  Psych:  Normal mood and affect. I have reviewed ordered lab tests and independently visualized imaging below:    Recent Labs:  Recent Results (from the past 48 hour(s))   METABOLIC PANEL, COMPREHENSIVE    Collection Time: 02/04/22  5:00 AM   Result Value Ref Range    Sodium 139 136 - 145 mmol/L    Potassium 4.0 3.5 - 5.1 mmol/L    Chloride 102 98 - 107 mmol/L    CO2 34 (H) 21 - 32 mmol/L    Anion gap 3 (L) 7 - 16 mmol/L    Glucose 105 (H) 65 - 100 mg/dL    BUN 33 (H) 8 - 23 MG/DL    Creatinine 0.70 0.6 - 1.0 MG/DL    GFR est AA >60 >60 ml/min/1.73m2    GFR est non-AA >60 >60 ml/min/1.73m2    Calcium 8.7 8.3 - 10.4 MG/DL    Bilirubin, total 0.4 0.2 - 1.1 MG/DL    ALT (SGPT) 25 12 - 65 U/L    AST (SGOT) 25 15 - 37 U/L    Alk.  phosphatase 70 50 - 136 U/L    Protein, total 5.7 (L) 6.3 - 8.2 g/dL    Albumin 2.6 (L) 3.2 - 4.6 g/dL    Globulin 3.1 2.3 - 3.5 g/dL    A-G Ratio 0.8 (L) 1.2 - 3.5     METABOLIC PANEL, COMPREHENSIVE    Collection Time: 02/05/22  4:41 AM   Result Value Ref Range    Sodium 138 136 - 145 mmol/L    Potassium 4.2 3.5 - 5.1 mmol/L    Chloride 103 98 - 107 mmol/L    CO2 32 21 - 32 mmol/L    Anion gap 3 (L) 7 - 16 mmol/L    Glucose 121 (H) 65 - 100 mg/dL    BUN 30 (H) 8 - 23 MG/DL    Creatinine 0.70 0.6 - 1.0 MG/DL    GFR est AA >60 >60 ml/min/1.73m2    GFR est non-AA >60 >60 ml/min/1.73m2    Calcium 8.9 8.3 - 10.4 MG/DL    Bilirubin, total 0.4 0.2 - 1.1 MG/DL    ALT (SGPT) 24 12 - 65 U/L    AST (SGOT) 24 15 - 37 U/L    Alk. phosphatase 78 50 - 136 U/L    Protein, total 5.8 (L) 6.3 - 8.2 g/dL    Albumin 2.7 (L) 3.2 - 4.6 g/dL    Globulin 3.1 2.3 - 3.5 g/dL    A-G Ratio 0.9 (L) 1.2 - 3.5         All Micro Results     Procedure Component Value Units Date/Time    CULTURE, URINE [[id number]]  (Abnormal)  (Susceptibility) Collected: 01/28/22 1105    Order Status: Completed Specimen: Urine from Clean catch Updated: 01/30/22 0843     Special Requests: NO SPECIAL REQUESTS        Culture result:       >100,000 COLONIES/mL ESCHERICHIA COLI                  <1,000 CFU/ML MIXED SKIN SCOTT ISOLATED          COVID-19 RAPID TEST [[id number]]  (Abnormal) Collected: 01/26/22 1258    Order Status: Completed Specimen: Nasopharyngeal Updated: 01/26/22 1316     Specimen source NASAL        COVID-19 rapid test Detected        Comment:      The specimen is POSITIVE for SARS-CoV-2, the novel coronavirus associated with COVID-19. This test has been authorized by the FDA under an Emergency Use Authorization (EUA) for use by authorized laboratories. Fact sheet for Healthcare Providers: ConventionUpdate.co.nz  Fact sheet for Patients: ConventionUpdate.co.nz       Methodology: Isothermal Nucleic Acid Amplification               Other Studies:  No results found.     Current Meds:  Current Facility-Administered Medications   Medication Dose Route Frequency    escitalopram oxalate (LEXAPRO) tablet 5 mg  5 mg Oral QPM    melatonin tablet 5 mg  5 mg Oral QHS    baricitinib (OLUMIANT) tablet 4 mg  4 mg Oral DAILY    lip protectant (BLISTEX) ointment 1 Each  1 Each Topical PRN    cefTRIAXone (ROCEPHIN) 1 g in 0.9% sodium chloride (MBP/ADV) 50 mL MBP  1 g IntraVENous Q24H    apixaban (ELIQUIS) tablet 5 mg  5 mg Oral BID    metoprolol tartrate (LOPRESSOR) tablet 50 mg  50 mg Oral Q12H    traMADoL (ULTRAM) tablet 100 mg  100 mg Oral Q8H PRN    dexlansoprazole (DEXILANT) capsule (delayed release) CpDB 60 mg (Patient Supplied)  60 mg Oral DAILY    sodium chloride (NS) flush 5-10 mL  5-10 mL IntraVENous Q8H    sodium chloride (NS) flush 5-10 mL  5-10 mL IntraVENous PRN    sodium chloride (NS) flush 5-40 mL  5-40 mL IntraVENous Q8H    sodium chloride (NS) flush 5-40 mL  5-40 mL IntraVENous PRN    acetaminophen (TYLENOL) tablet 650 mg  650 mg Oral Q6H PRN    Or    acetaminophen (TYLENOL) suppository 650 mg  650 mg Rectal Q6H PRN    polyethylene glycol (MIRALAX) packet 17 g  17 g Oral DAILY PRN    ondansetron (ZOFRAN ODT) tablet 4 mg  4 mg Oral Q8H PRN    Or    ondansetron (ZOFRAN) injection 4 mg  4 mg IntraVENous Q6H PRN    guaiFENesin-dextromethorphan (ROBITUSSIN DM) 100-10 mg/5 mL syrup 5 mL  5 mL Oral Q4H PRN    cholecalciferol (VITAMIN D3) (1000 Units /25 mcg) tablet 2,000 Units  2,000 Units Oral DAILY    alum-mag hydroxide-simeth (MYLANTA) oral suspension 15 mL  15 mL Oral Q6H PRN    albuterol (PROVENTIL HFA, VENTOLIN HFA, PROAIR HFA) inhaler 2 Puff  2 Puff Inhalation Q4H PRN    atorvastatin (LIPITOR) tablet 40 mg  40 mg Oral QHS    gabapentin (NEURONTIN) capsule 300 mg  300 mg Oral TID PRN    temazepam (RESTORIL) capsule 15 mg  15 mg Oral QHS PRN       Signed:  Gali Fernandes MD

## 2022-02-06 LAB
ALBUMIN SERPL-MCNC: 2.7 G/DL (ref 3.2–4.6)
ALBUMIN/GLOB SERPL: 1.1 {RATIO} (ref 1.2–3.5)
ALP SERPL-CCNC: 73 U/L (ref 50–136)
ALT SERPL-CCNC: 25 U/L (ref 12–65)
ANION GAP SERPL CALC-SCNC: 3 MMOL/L (ref 7–16)
AST SERPL-CCNC: 27 U/L (ref 15–37)
BILIRUB SERPL-MCNC: 0.4 MG/DL (ref 0.2–1.1)
BUN SERPL-MCNC: 41 MG/DL (ref 8–23)
CALCIUM SERPL-MCNC: 8.9 MG/DL (ref 8.3–10.4)
CHLORIDE SERPL-SCNC: 104 MMOL/L (ref 98–107)
CO2 SERPL-SCNC: 34 MMOL/L (ref 21–32)
CREAT SERPL-MCNC: 0.8 MG/DL (ref 0.6–1)
GLOBULIN SER CALC-MCNC: 2.4 G/DL (ref 2.3–3.5)
GLUCOSE SERPL-MCNC: 95 MG/DL (ref 65–100)
POTASSIUM SERPL-SCNC: 4.3 MMOL/L (ref 3.5–5.1)
PROT SERPL-MCNC: 5.1 G/DL (ref 6.3–8.2)
SODIUM SERPL-SCNC: 141 MMOL/L (ref 136–145)

## 2022-02-06 PROCEDURE — 74011000250 HC RX REV CODE- 250: Performed by: FAMILY MEDICINE

## 2022-02-06 PROCEDURE — 36415 COLL VENOUS BLD VENIPUNCTURE: CPT

## 2022-02-06 PROCEDURE — 65660000000 HC RM CCU STEPDOWN

## 2022-02-06 PROCEDURE — 80053 COMPREHEN METABOLIC PANEL: CPT

## 2022-02-06 PROCEDURE — 77010033711 HC HIGH FLOW OXYGEN

## 2022-02-06 PROCEDURE — 74011250636 HC RX REV CODE- 250/636: Performed by: STUDENT IN AN ORGANIZED HEALTH CARE EDUCATION/TRAINING PROGRAM

## 2022-02-06 PROCEDURE — 74011250637 HC RX REV CODE- 250/637: Performed by: STUDENT IN AN ORGANIZED HEALTH CARE EDUCATION/TRAINING PROGRAM

## 2022-02-06 PROCEDURE — 94761 N-INVAS EAR/PLS OXIMETRY MLT: CPT

## 2022-02-06 PROCEDURE — 74011000250 HC RX REV CODE- 250: Performed by: STUDENT IN AN ORGANIZED HEALTH CARE EDUCATION/TRAINING PROGRAM

## 2022-02-06 PROCEDURE — 74011250637 HC RX REV CODE- 250/637: Performed by: FAMILY MEDICINE

## 2022-02-06 PROCEDURE — 74011000258 HC RX REV CODE- 258: Performed by: STUDENT IN AN ORGANIZED HEALTH CARE EDUCATION/TRAINING PROGRAM

## 2022-02-06 RX ORDER — LIDOCAINE 4 G/100G
1 PATCH TOPICAL EVERY 24 HOURS
Status: DISCONTINUED | OUTPATIENT
Start: 2022-02-06 | End: 2022-02-16 | Stop reason: HOSPADM

## 2022-02-06 RX ORDER — GABAPENTIN 400 MG/1
800 CAPSULE ORAL 2 TIMES DAILY
Status: DISCONTINUED | OUTPATIENT
Start: 2022-02-06 | End: 2022-02-16 | Stop reason: HOSPADM

## 2022-02-06 RX ORDER — ALPRAZOLAM 0.5 MG/1
0.25 TABLET ORAL
Status: DISCONTINUED | OUTPATIENT
Start: 2022-02-06 | End: 2022-02-09

## 2022-02-06 RX ADMIN — ESCITALOPRAM OXALATE 5 MG: 10 TABLET ORAL at 16:33

## 2022-02-06 RX ADMIN — SODIUM CHLORIDE, PRESERVATIVE FREE 5 ML: 5 INJECTION INTRAVENOUS at 13:07

## 2022-02-06 RX ADMIN — GABAPENTIN 800 MG: 400 CAPSULE ORAL at 16:34

## 2022-02-06 RX ADMIN — SODIUM CHLORIDE, PRESERVATIVE FREE 10 ML: 5 INJECTION INTRAVENOUS at 05:56

## 2022-02-06 RX ADMIN — DEXLANSOPRAZOLE 60 MG: 60 CAPSULE, DELAYED RELEASE ORAL at 08:48

## 2022-02-06 RX ADMIN — SODIUM CHLORIDE, PRESERVATIVE FREE 10 ML: 5 INJECTION INTRAVENOUS at 21:20

## 2022-02-06 RX ADMIN — APIXABAN 5 MG: 5 TABLET, FILM COATED ORAL at 21:20

## 2022-02-06 RX ADMIN — TEMAZEPAM 15 MG: 15 CAPSULE ORAL at 21:20

## 2022-02-06 RX ADMIN — TRAMADOL HYDROCHLORIDE 100 MG: 50 TABLET, COATED ORAL at 19:34

## 2022-02-06 RX ADMIN — VITAMIN D, TAB 1000IU (100/BT) 2000 UNITS: 25 TAB at 08:46

## 2022-02-06 RX ADMIN — APIXABAN 5 MG: 5 TABLET, FILM COATED ORAL at 08:48

## 2022-02-06 RX ADMIN — BARICITINIB 4 MG: 2 TABLET, FILM COATED ORAL at 08:46

## 2022-02-06 RX ADMIN — Medication 5 MG: at 21:20

## 2022-02-06 RX ADMIN — SODIUM CHLORIDE, PRESERVATIVE FREE 10 ML: 5 INJECTION INTRAVENOUS at 21:21

## 2022-02-06 RX ADMIN — GABAPENTIN 300 MG: 300 CAPSULE ORAL at 01:53

## 2022-02-06 RX ADMIN — ATORVASTATIN CALCIUM 40 MG: 40 TABLET, FILM COATED ORAL at 21:20

## 2022-02-06 RX ADMIN — ALPRAZOLAM 0.25 MG: 0.5 TABLET ORAL at 21:20

## 2022-02-06 RX ADMIN — CEFTRIAXONE 1 G: 1 INJECTION, POWDER, FOR SOLUTION INTRAMUSCULAR; INTRAVENOUS at 08:48

## 2022-02-06 NOTE — PROGRESS NOTES
Hospitalist Progress Note   Admit Date:  2022 12:32 PM   Name:  Fannie Perez   Age:  80 y.o. Sex:  female  :  1935   MRN:  [id number]   Room:  [facility]/    Presenting Complaint: Positive For Covid-19    Reason(s) for Admission: Acute hypoxemic respiratory failure due to COVID-19 [facility]) [U07.1, J96.01]     Hospital Course & Interval History:   Melly Pabon a 80 y.o. female with medical history of HTN, mixed HLD, GERD, Glaucoma, Hay fever,  HLD, insomnia who presented from home via EMS with hypoxia and AMS with recent diagnosis of COVID-19. EMS reports patient was hypoxic on arrival, O2 saturation in the 60s.  Placed on 6 L and given terbutaline.  EMS also noted family some concern for patient being more altered than normal.     In ED, T102 BP 91/56    spo2 74% RA, 85% 6L NC, 91% 55L/m  LA 3.9 CRP 17.3 D dimer 3.85 NT pro BNP 3481 HS trop 1544>1609   CXR BL infiltrates   rec'd Tylenol 1000 mg and decadron 10 mg IV      EKG shows sinus tachycardia, rate 115, , QRS 92, QTc 491     She is alert and oriented to person, place  but not month or year. Unable to get in touch with either son or . She notes symptom onset 5 days ago. Patient denies CP or palpitations, nausea, vomiting, diarrhea, fevers. +chills, +cough. Says  is sick. Discussed Actemra with patient including risks and benefits.      satting 94% on 55/90% airvo.     Subjective (22):  2/3/2022  Wants to go home,pt on fio2 80%  2022  Complaining of being anxious, decreased sleep manic depressed, no suicidal ideation, on Airvo with FiO2 of 85%, oxygen saturation ranging from 95 to 100%. 22  On Airvo FiO2 55%, says breathing better, did not sleep at bed intermittently last night, spoke to son on phone. Wants to go home.     2022  Wants to go home, complaining of back pain, says did not have good sleep, patient presently on Airvo with FiO2 of 55 this morning, weaned to 50 later on this afternoon, on flow at 35 L/min  Spoke to family on phone in presence of patient    Assessment & Plan:   Acute Hypoxemic Respiratory Failure 2/2 COVID-19 PNA   Patient is saturating well on a low 50 L/MIN  Suspected Cytokine Storm with elevated markers   - COVID + 1/16 ,  Unvaccinated   - Decadron 6 mg x 10 doses   -Continue Bacitricininb  2/3/2022- on airvo at 80% fio2  2/4/2022-on Airvo at 85% FiO2, probably can wean as oxygen saturation ranging from 95 -100%  2/5/22-on Airvo, FiO2 55%  2/6/2022-on Airvo at FiO2 50%, finished course of Decadron on 2/4/2022, presently on baricitinib    uti- e.coli- on rocephin  2/6/2022-finished a course of Rocephin for UTI    Insomnia/anxiety/depression  No suicidal ideation  We will start on Lexapro and melatonin  2/6/22-we will add as needed Xanax 25 mg nightly        New onset Afib  EKG showed A. fib with RVR with HR 170s  Troponins were elevated and down trended  Lilian vasc score of 4 and stroke risk of 4.8% per yr  Continue  metoprolol  Continue Eliquis   echo with normal EF   Cardiology signed off  -    Sepsis 2/2 COVID -   Resolved   admin 30 cc/kg bolus. 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 Pro calcitonin elevated 1.15  Ct chest showed covid and no PE         Troponemia  Cardiac enzymes downtrending. EKG does not demonstrate ST segment elevation. Cardiology consulted Hypercoagulable state associated with COVID-19 -      Hypokalemia // Hypomagnesemia - associated with prolonged QTc   Resolved     Acute Encephalopathy -  Resolved        Chronic insomnia - takes temazepam 30 mg nightly for this. At risk of withdrawal. Reduce dose to 15 mg as needed.      Lumbar spondylosis - f/b pain management. Takes gabapentin 800 mg BID, tramadol 100 mg BID. Reduce gabapentin 300 mg TID prn, hold tramadol for now.     2/6/2022-complaining of back pain, will increase gabapentin to her home dosage of 800 mg twice daily, added lidocaine patch        HLD - continue  atorvastatin      H/o HTN - Normotensive Monitor      GERD - resume protonix.           Diet:  ADULT DIET Easy to Chew; Low Fat/Low Chol/High Fiber/EVA  DVT PPx: Eliquis  Code status: Full Code                Hospital Problems as of 2/6/2022 Date Reviewed: 4/12/2018          Codes Class Noted - Resolved POA    UTI (urinary tract infection) ICD-10-CM: N39.0  ICD-9-CM: 599.0  2/3/2022 - Present Unknown        * (Principal) Acute hypoxemic respiratory failure due to COVID-19 [facility]) ICD-10-CM: U07.1, J96.01  ICD-9-CM: 518.81, 079.89, 799.02  1/26/2022 - Present Yes        [facility]) ICD-10-CM: U07.1, A41.89  ICD-9-CM: 079.89, 995.91  1/26/2022 - Present Yes        Hypomagnesemia ICD-10-CM: E83.42  ICD-9-CM: 275.2  1/26/2022 - Present Yes        Hypokalemia ICD-10-CM: E87.6  ICD-9-CM: 276.8  1/26/2022 - Present Yes        Prolonged Q-T interval on ECG ICD-10-CM: R94.31  ICD-9-CM: 794.31  1/26/2022 - Present Yes        Elevated troponin ICD-10-CM: R77.8  ICD-9-CM: 790.6  1/26/2022 - Present Yes        Toxic metabolic encephalopathy QDE-85-YQ: G92.8  ICD-9-CM: 349.82  1/26/2022 - Present Yes        Chronic prescription benzodiazepine use ICD-10-CM: Z79.899  ICD-9-CM: V58.69  1/26/2022 - Present Yes        Hypercoagulable state associated with COVID-19 ([facility] [address] ICD-10-CM: U07.1, D68.69  ICD-9-CM: 289.82, 079.89  1/26/2022 - Present Yes        Transaminitis ICD-10-CM: R74.01  ICD-9-CM: 790.4  1/26/2022 - Present Yes        Gastroesophageal reflux disease without esophagitis ICD-10-CM: K21.9  ICD-9-CM: 530.81  9/28/2017 - Present Yes    Overview Signed 1/9/2018 10:12 AM by Jacque Park DO     Last Assessment & Plan:   Stable. Essential (primary) hypertension ICD-10-CM: I10  ICD-9-CM: 401.9  10/28/2016 - Present Yes    Overview Signed 1/9/2018 10:12 AM by Jacque Park DO     Last Assessment & Plan:   Stable.              Hyperlipidemia ICD-10-CM: E78.5  ICD-9-CM: 272.4  10/28/2016 - Present Yes    Overview Signed 1/9/2018 10:12 AM by Mayur Tripp DO     Last Assessment & Plan:   Return for fasting labs. Insomnia, persistent ICD-10-CM: G47.00  ICD-9-CM: 307.42  4/27/2016 - Present Yes    Overview Signed 1/9/2018 10:12 AM by Mayur Tripp DO     Last Assessment & Plan:   Restoril prescription printed & given. Primary osteoarthritis involving multiple joints ICD-10-CM: M89.49  ICD-9-CM: 715.98  7/31/2015 - Present Yes    Overview Signed 1/9/2018 10:12 AM by Mayur Tripp DO     Last Assessment & Plan:   3 Norco scripts printed with do not fill before dates of 10/10, 11/9, 12/9. Tramadol prescription printed & given (decreased from 150 to 120 tablets per month based on how patient is taking it). Aware of risk of sedation and aware to get prescriptions only in our office. Discussed polypharmacy & fall risk with patient's multiple sedating medications, encouraged to limit use as much as possible. Will have her see Dr. Aurelia Anne for next follow up to review pain medication regimen. Pt verbalized understanding, agreed with plan. Objective:     Patient Vitals for the past 24 hrs:   Temp Pulse Resp BP SpO2   02/06/22 1116 97.9 °F (36.6 °C) 61 20 120/71 95 %   02/06/22 1005 -- -- -- -- 97 %   02/06/22 0722 97.6 °F (36.4 °C) (!) 48 20 119/71 98 %   02/06/22 0615 97.8 °F (36.6 °C) (!) 50 18 97/61 96 %   02/06/22 0005 -- (!) 56 -- -- --   02/05/22 2252 97.1 °F (36.2 °C) (!) 59 18 102/62 94 %   02/05/22 1940 98.7 °F (37.1 °C) (!) 59 18 108/72 95 %   02/05/22 1445 98.8 °F (37.1 °C) 61 20 110/66 96 %     Oxygen Therapy  O2 Sat (%): 95 % (02/06/22 1116)  Pulse via Oximetry: 65 beats per minute (02/06/22 1005)  O2 Device: Hi flow nasal cannula; Heated (02/06/22 1005)  Skin Assessment: Clean, dry, & intact (02/06/22 1005)  O2 Flow Rate (L/min): 35 l/min (02/06/22 1005)  O2 Temperature: 87.8 °F (31 °C) (02/06/22 1005)  FIO2 (%): 50 % (02/06/22 1005)    Estimated body mass index is 27.62 kg/m² as calculated from the following:    Height as of this encounter: 5' 1\" (1.549 m). Weight as of this encounter: 66.3 kg (146 lb 3.2 oz). Intake/Output Summary (Last 24 hours) at 2/6/2022 1313  Last data filed at 2/6/2022 0848  Gross per 24 hour   Intake 480 ml   Output --   Net 480 ml         Physical Exam:     Blood pressure 120/71, pulse 61, temperature 97.9 °F (36.6 °C), resp. rate 20, height 5' 1\" (1.549 m), weight 66.3 kg (146 lb 3.2 oz), SpO2 95 %. General:    Well nourished. Mild resp distress on 50% fio2,airvo  Head:  Normocephalic, atraumatic  Eyes:  Sclerae appear normal.  Pupils equally round. ENT:  Nares appear normal, no drainage. Moist oral mucosa  Neck:  No restricted ROM. Trachea midline   CV:   RRR. No m/r/g. No jugular venous distension. Lungs:   Bilateral coarse breath sounds  Abdomen: Bowel sounds present. Soft, nontender, nondistended. Extremities: No cyanosis or clubbing. No edema  Skin:     No rashes and normal coloration. Warm and dry. Neuro:  CN II-XII grossly intact. Sensation intact. A&Ox3  Psych:  Normal mood and affect. I have reviewed ordered lab tests and independently visualized imaging below:    Recent Labs:  Recent Results (from the past 48 hour(s))   METABOLIC PANEL, COMPREHENSIVE    Collection Time: 02/05/22  4:41 AM   Result Value Ref Range    Sodium 138 136 - 145 mmol/L    Potassium 4.2 3.5 - 5.1 mmol/L    Chloride 103 98 - 107 mmol/L    CO2 32 21 - 32 mmol/L    Anion gap 3 (L) 7 - 16 mmol/L    Glucose 121 (H) 65 - 100 mg/dL    BUN 30 (H) 8 - 23 MG/DL    Creatinine 0.70 0.6 - 1.0 MG/DL    GFR est AA >60 >60 ml/min/1.73m2    GFR est non-AA >60 >60 ml/min/1.73m2    Calcium 8.9 8.3 - 10.4 MG/DL    Bilirubin, total 0.4 0.2 - 1.1 MG/DL    ALT (SGPT) 24 12 - 65 U/L    AST (SGOT) 24 15 - 37 U/L    Alk.  phosphatase 78 50 - 136 U/L    Protein, total 5.8 (L) 6.3 - 8.2 g/dL Albumin 2.7 (L) 3.2 - 4.6 g/dL    Globulin 3.1 2.3 - 3.5 g/dL    A-G Ratio 0.9 (L) 1.2 - 3.5     METABOLIC PANEL, COMPREHENSIVE    Collection Time: 02/06/22  4:16 AM   Result Value Ref Range    Sodium 141 136 - 145 mmol/L    Potassium 4.3 3.5 - 5.1 mmol/L    Chloride 104 98 - 107 mmol/L    CO2 34 (H) 21 - 32 mmol/L    Anion gap 3 (L) 7 - 16 mmol/L    Glucose 95 65 - 100 mg/dL    BUN 41 (H) 8 - 23 MG/DL    Creatinine 0.80 0.6 - 1.0 MG/DL    GFR est AA >60 >60 ml/min/1.73m2    GFR est non-AA >60 >60 ml/min/1.73m2    Calcium 8.9 8.3 - 10.4 MG/DL    Bilirubin, total 0.4 0.2 - 1.1 MG/DL    ALT (SGPT) 25 12 - 65 U/L    AST (SGOT) 27 15 - 37 U/L    Alk. phosphatase 73 50 - 136 U/L    Protein, total 5.1 (L) 6.3 - 8.2 g/dL    Albumin 2.7 (L) 3.2 - 4.6 g/dL    Globulin 2.4 2.3 - 3.5 g/dL    A-G Ratio 1.1 (L) 1.2 - 3.5         All Micro Results     Procedure Component Value Units Date/Time    CULTURE, URINE [[id number]]  (Abnormal)  (Susceptibility) Collected: 01/28/22 1105    Order Status: Completed Specimen: Urine from Clean catch Updated: 01/30/22 0863     Special Requests: NO SPECIAL REQUESTS        Culture result:       >100,000 COLONIES/mL ESCHERICHIA COLI                  <1,000 CFU/ML MIXED SKIN SCOTT ISOLATED          COVID-19 RAPID TEST [[id number]]  (Abnormal) Collected: 01/26/22 1258    Order Status: Completed Specimen: Nasopharyngeal Updated: 01/26/22 1316     Specimen source NASAL        COVID-19 rapid test Detected        Comment:      The specimen is POSITIVE for SARS-CoV-2, the novel coronavirus associated with COVID-19. This test has been authorized by the FDA under an Emergency Use Authorization (EUA) for use by authorized laboratories. Fact sheet for Healthcare Providers: ConventionUpdate.co.nz  Fact sheet for Patients: ConventionVictoria Plumbdate.co.nz       Methodology: Isothermal Nucleic Acid Amplification               Other Studies:  No results found.     Current Meds:  Current Facility-Administered Medications   Medication Dose Route Frequency    lidocaine 4 % patch 1 Patch  1 Patch TransDERmal Q24H    ALPRAZolam (XANAX) tablet 0.25 mg  0.25 mg Oral QHS PRN    escitalopram oxalate (LEXAPRO) tablet 5 mg  5 mg Oral QPM    melatonin tablet 5 mg  5 mg Oral QHS    baricitinib (OLUMIANT) tablet 4 mg  4 mg Oral DAILY    lip protectant (BLISTEX) ointment 1 Each  1 Each Topical PRN    apixaban (ELIQUIS) tablet 5 mg  5 mg Oral BID    metoprolol tartrate (LOPRESSOR) tablet 50 mg  50 mg Oral Q12H    traMADoL (ULTRAM) tablet 100 mg  100 mg Oral Q8H PRN    dexlansoprazole (DEXILANT) capsule (delayed release) CpDB 60 mg (Patient Supplied)  60 mg Oral DAILY    sodium chloride (NS) flush 5-10 mL  5-10 mL IntraVENous Q8H    sodium chloride (NS) flush 5-10 mL  5-10 mL IntraVENous PRN    sodium chloride (NS) flush 5-40 mL  5-40 mL IntraVENous Q8H    sodium chloride (NS) flush 5-40 mL  5-40 mL IntraVENous PRN    acetaminophen (TYLENOL) tablet 650 mg  650 mg Oral Q6H PRN    Or    acetaminophen (TYLENOL) suppository 650 mg  650 mg Rectal Q6H PRN    polyethylene glycol (MIRALAX) packet 17 g  17 g Oral DAILY PRN    ondansetron (ZOFRAN ODT) tablet 4 mg  4 mg Oral Q8H PRN    Or    ondansetron (ZOFRAN) injection 4 mg  4 mg IntraVENous Q6H PRN    guaiFENesin-dextromethorphan (ROBITUSSIN DM) 100-10 mg/5 mL syrup 5 mL  5 mL Oral Q4H PRN    cholecalciferol (VITAMIN D3) (1000 Units /25 mcg) tablet 2,000 Units  2,000 Units Oral DAILY    alum-mag hydroxide-simeth (MYLANTA) oral suspension 15 mL  15 mL Oral Q6H PRN    albuterol (PROVENTIL HFA, VENTOLIN HFA, PROAIR HFA) inhaler 2 Puff  2 Puff Inhalation Q4H PRN    atorvastatin (LIPITOR) tablet 40 mg  40 mg Oral QHS    gabapentin (NEURONTIN) capsule 300 mg  300 mg Oral TID PRN    temazepam (RESTORIL) capsule 15 mg  15 mg Oral QHS PRN       Signed:  Selinda Fothergill, MD

## 2022-02-07 LAB
ANION GAP SERPL CALC-SCNC: 3 MMOL/L (ref 7–16)
BUN SERPL-MCNC: 37 MG/DL (ref 8–23)
CALCIUM SERPL-MCNC: 8.7 MG/DL (ref 8.3–10.4)
CHLORIDE SERPL-SCNC: 103 MMOL/L (ref 98–107)
CO2 SERPL-SCNC: 34 MMOL/L (ref 21–32)
CREAT SERPL-MCNC: 0.6 MG/DL (ref 0.6–1)
GLUCOSE SERPL-MCNC: 104 MG/DL (ref 65–100)
POTASSIUM SERPL-SCNC: 4.6 MMOL/L (ref 3.5–5.1)
SODIUM SERPL-SCNC: 140 MMOL/L (ref 136–145)

## 2022-02-07 PROCEDURE — 94762 N-INVAS EAR/PLS OXIMTRY CONT: CPT

## 2022-02-07 PROCEDURE — 74011000250 HC RX REV CODE- 250: Performed by: FAMILY MEDICINE

## 2022-02-07 PROCEDURE — 97530 THERAPEUTIC ACTIVITIES: CPT

## 2022-02-07 PROCEDURE — 65660000000 HC RM CCU STEPDOWN

## 2022-02-07 PROCEDURE — 36415 COLL VENOUS BLD VENIPUNCTURE: CPT

## 2022-02-07 PROCEDURE — 74011000250 HC RX REV CODE- 250: Performed by: STUDENT IN AN ORGANIZED HEALTH CARE EDUCATION/TRAINING PROGRAM

## 2022-02-07 PROCEDURE — 74011250637 HC RX REV CODE- 250/637: Performed by: STUDENT IN AN ORGANIZED HEALTH CARE EDUCATION/TRAINING PROGRAM

## 2022-02-07 PROCEDURE — 74011250637 HC RX REV CODE- 250/637: Performed by: FAMILY MEDICINE

## 2022-02-07 PROCEDURE — 77010033711 HC HIGH FLOW OXYGEN

## 2022-02-07 PROCEDURE — 74011250637 HC RX REV CODE- 250/637: Performed by: NURSE PRACTITIONER

## 2022-02-07 PROCEDURE — 80048 BASIC METABOLIC PNL TOTAL CA: CPT

## 2022-02-07 RX ADMIN — GABAPENTIN 800 MG: 400 CAPSULE ORAL at 17:00

## 2022-02-07 RX ADMIN — SODIUM CHLORIDE, PRESERVATIVE FREE 10 ML: 5 INJECTION INTRAVENOUS at 16:14

## 2022-02-07 RX ADMIN — APIXABAN 5 MG: 5 TABLET, FILM COATED ORAL at 21:19

## 2022-02-07 RX ADMIN — Medication 5 MG: at 21:19

## 2022-02-07 RX ADMIN — GABAPENTIN 800 MG: 400 CAPSULE ORAL at 08:49

## 2022-02-07 RX ADMIN — ESCITALOPRAM OXALATE 5 MG: 10 TABLET ORAL at 17:00

## 2022-02-07 RX ADMIN — TEMAZEPAM 15 MG: 15 CAPSULE ORAL at 21:19

## 2022-02-07 RX ADMIN — SODIUM CHLORIDE, PRESERVATIVE FREE 10 ML: 5 INJECTION INTRAVENOUS at 21:19

## 2022-02-07 RX ADMIN — ATORVASTATIN CALCIUM 40 MG: 40 TABLET, FILM COATED ORAL at 21:19

## 2022-02-07 RX ADMIN — APIXABAN 5 MG: 5 TABLET, FILM COATED ORAL at 08:49

## 2022-02-07 RX ADMIN — SODIUM CHLORIDE, PRESERVATIVE FREE 10 ML: 5 INJECTION INTRAVENOUS at 16:13

## 2022-02-07 RX ADMIN — SODIUM CHLORIDE, PRESERVATIVE FREE 5 ML: 5 INJECTION INTRAVENOUS at 06:07

## 2022-02-07 RX ADMIN — VITAMIN D, TAB 1000IU (100/BT) 2000 UNITS: 25 TAB at 08:49

## 2022-02-07 RX ADMIN — METOPROLOL TARTRATE 50 MG: 50 TABLET, FILM COATED ORAL at 08:49

## 2022-02-07 RX ADMIN — TRAMADOL HYDROCHLORIDE 100 MG: 50 TABLET, COATED ORAL at 13:16

## 2022-02-07 RX ADMIN — ALPRAZOLAM 0.25 MG: 0.5 TABLET ORAL at 21:20

## 2022-02-07 RX ADMIN — BARICITINIB 4 MG: 2 TABLET, FILM COATED ORAL at 08:49

## 2022-02-07 RX ADMIN — DEXLANSOPRAZOLE 60 MG: 60 CAPSULE, DELAYED RELEASE ORAL at 08:50

## 2022-02-07 NOTE — PROGRESS NOTES
Report received from Julia Em, [address] [facility]. Patient in bed resting. Respirations even and unlabored. On airvo 30L/60%. All needs addressed. Safety measures in place. Call light in reach. No signs of acute distress. Will continue to monitor.

## 2022-02-07 NOTE — PROGRESS NOTES
Hospitalist Progress Note   Admit Date:  2022 12:32 PM   Name:  Vijay Chaves   Age:  80 y.o. Sex:  female  :  1935   MRN:  [id number]   Room:  [facility]/    Presenting Complaint: Positive For Covid-19    Reason(s) for Admission: Acute hypoxemic respiratory failure due to COVID-19 [facility]) [U07.1, J96.01]     Hospital Course & Interval History:   Vesta Peres a 80 y.o. female with medical history of HTN, mixed HLD, GERD, Glaucoma, Hay fever,  HLD, insomnia who presented from home via EMS with hypoxia and AMS with recent diagnosis of COVID-19. EMS reports patient was hypoxic on arrival, O2 saturation in the 60s.  Placed on 6 L and given terbutaline.  EMS also noted family some concern for patient being more altered than normal.     In ED, T102 BP 91/56    spo2 74% RA, 85% 6L NC, 91% 55L/m  LA 3.9 CRP 17.3 D dimer 3.85 NT pro BNP 3481 HS trop 1544>1609   CXR BL infiltrates   rec'd Tylenol 1000 mg and decadron 10 mg IV      EKG shows sinus tachycardia, rate 115, , QRS 92, QTc 491     She is alert and oriented to person, place  but not month or year. Unable to get in touch with either son or . She notes symptom onset 5 days ago. Patient denies CP or palpitations, nausea, vomiting, diarrhea, fevers. +chills, +cough. Says  is sick. Discussed Actemra with patient including risks and benefits.      satting 94% on 55/90% airvo.     Subjective (22):  2/3/2022  Wants to go home,pt on fio2 80%  2022  Complaining of being anxious, decreased sleep manic depressed, no suicidal ideation, on Airvo with FiO2 of 85%, oxygen saturation ranging from 95 to 100%. 22  On Airvo FiO2 55%, says breathing better, did not sleep at bed intermittently last night, spoke to son on phone. Wants to go home.     2022  Wants to go home, complaining of back pain, says did not have good sleep, patient presently on Airvo with FiO2 of 55 this morning, weaned to 50 later on this afternoon, on flow at 35 L/min  Spoke to family on phone in presence of patient    2/7/2022  Wants to go home  Says breathing better  On Airvo with FiO2 of 66%    Assessment & Plan:   Acute Hypoxemic Respiratory Failure 2/2 COVID-19 PNA   Patient is saturating well on a low 50 L/MIN  Suspected Cytokine Storm with elevated markers   - COVID + 1/16 ,  Unvaccinated   - Decadron 6 mg x 10 doses   -Continue Bacitricininb  2/3/2022- on airvo at 80% fio2  2/4/2022-on Airvo at 85% FiO2, probably can wean as oxygen saturation ranging from 95 -100%  2/5/22-on Airvo, FiO2 55%  2/6/2022-on Airvo at FiO2 50%, finished course of Decadron on 2/4/2022, presently on baricitinib  2/7/2022-Airvo FiO2 66%    uti- e.coli- on rocephin  2/6/2022-finished a course of Rocephin for UTI    Insomnia/anxiety/depression  No suicidal ideation  We will start on Lexapro and melatonin  2/6/22-we will add as needed Xanax 25 mg nightly        New onset Afib  EKG showed A. fib with RVR with HR 170s  Troponins were elevated and down trended  Lilian vasc score of 4 and stroke risk of 4.8% per yr  Continue  metoprolol  Continue Eliquis   echo with normal EF   Cardiology signed off  -    Sepsis 2/2 COVID -   Resolved   admin 30 cc/kg bolus. 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 Pro calcitonin elevated 1.15  Ct chest showed covid and no PE         Troponemia  Cardiac enzymes downtrending. EKG does not demonstrate ST segment elevation. Cardiology consulted Hypercoagulable state associated with COVID-19 -      Hypokalemia // Hypomagnesemia - associated with prolonged QTc   Resolved     Acute Encephalopathy -  Resolved        Chronic insomnia - takes temazepam 30 mg nightly for this. At risk of withdrawal. Reduce dose to 15 mg as needed.      Lumbar spondylosis - f/b pain management. Takes gabapentin 800 mg BID, tramadol 100 mg BID. Reduce gabapentin 300 mg TID prn, hold tramadol for now. 2/6/2022-complaining of back pain, will increase gabapentin to her home dosage of 800 mg twice daily, added lidocaine patch        HLD - continue  atorvastatin      H/o HTN - Normotensive Monitor      GERD - resume protonix.           Diet:  ADULT DIET Easy to Chew; Low Fat/Low Chol/High Fiber/EVA  DVT PPx: Eliquis  Code status: Full Code                Hospital Problems as of 2/7/2022 Date Reviewed: 4/12/2018          Codes Class Noted - Resolved POA    UTI (urinary tract infection) ICD-10-CM: N39.0  ICD-9-CM: 599.0  2/3/2022 - Present Unknown        * (Principal) Acute hypoxemic respiratory failure due to COVID-19 [facility]) ICD-10-CM: U07.1, J96.01  ICD-9-CM: 518.81, 079.89, 799.02  1/26/2022 - Present Yes        Sepsis due to COVID-19 [facility]) ICD-10-CM: U07.1, A41.89  ICD-9-CM: 079.89, 995.91  1/26/2022 - Present Yes        Hypomagnesemia ICD-10-CM: E83.42  ICD-9-CM: 275.2  1/26/2022 - Present Yes        Hypokalemia ICD-10-CM: E87.6  ICD-9-CM: 276.8  1/26/2022 - Present Yes        Prolonged Q-T interval on ECG ICD-10-CM: R94.31  ICD-9-CM: 794.31  1/26/2022 - Present Yes        Elevated troponin ICD-10-CM: R77.8  ICD-9-CM: 790.6  1/26/2022 - Present Yes        Toxic metabolic encephalopathy JOP-38-PB: G92.8  ICD-9-CM: 349.82  1/26/2022 - Present Yes        Chronic prescription benzodiazepine use ICD-10-CM: Z79.899  ICD-9-CM: V58.69  1/26/2022 - Present Yes        Hypercoagulable state associated with COVID-19 ([facility] [address] ICD-10-CM: U07.1, D68.69  ICD-9-CM: 289.82, 079.89  1/26/2022 - Present Yes        Transaminitis ICD-10-CM: R74.01  ICD-9-CM: 790.4  1/26/2022 - Present Yes        Gastroesophageal reflux disease without esophagitis ICD-10-CM: K21.9  ICD-9-CM: 530.81  9/28/2017 - Present Yes    Overview Signed 1/9/2018 10:12 AM by Randa Sparrow DO     Last Assessment & Plan:   Stable.              Essential (primary) hypertension ICD-10-CM: I10  ICD-9-CM: 401.9  10/28/2016 - Present Yes    Overview Signed 1/9/2018 10:12 AM by Sushma Resendiz DO     Last Assessment & Plan:   Stable. Hyperlipidemia ICD-10-CM: E78.5  ICD-9-CM: 272.4  10/28/2016 - Present Yes    Overview Signed 1/9/2018 10:12 AM by Sushma Resendiz DO     Last Assessment & Plan:   Return for fasting labs. Insomnia, persistent ICD-10-CM: G47.00  ICD-9-CM: 307.42  4/27/2016 - Present Yes    Overview Signed 1/9/2018 10:12 AM by Sushma Resendiz DO     Last Assessment & Plan:   Restoril prescription printed & given. Primary osteoarthritis involving multiple joints ICD-10-CM: M89.49  ICD-9-CM: 715.98  7/31/2015 - Present Yes    Overview Signed 1/9/2018 10:12 AM by Sushma Resendiz DO     Last Assessment & Plan:   3 Norco scripts printed with do not fill before dates of 10/10, 11/9, 12/9. Tramadol prescription printed & given (decreased from 150 to 120 tablets per month based on how patient is taking it). Aware of risk of sedation and aware to get prescriptions only in our office. Discussed polypharmacy & fall risk with patient's multiple sedating medications, encouraged to limit use as much as possible. Will have her see Dr. Kye Castillo for next follow up to review pain medication regimen. Pt verbalized understanding, agreed with plan. Objective:     Patient Vitals for the past 24 hrs:   Temp Pulse Resp BP SpO2   02/07/22 1139 97.4 °F (36.3 °C) (!) 53 20 109/63 93 %   02/07/22 0938 -- -- -- -- 98 %   02/07/22 0752 97.5 °F (36.4 °C) (!) 54 20 102/61 97 %   02/07/22 0418 97.4 °F (36.3 °C) (!) 57 18 108/66 98 %   02/07/22 0400 -- (!) 49 -- -- --   02/06/22 2349 -- 60 -- -- --   02/06/22 2348 97.5 °F (36.4 °C) 63 18 102/64 97 %   02/06/22 2000 -- 63 -- -- --   02/06/22 1950 98.3 °F (36.8 °C) 64 18 101/62 93 %   02/06/22 1522 98 °F (36.7 °C) 68 20 110/71 96 %     Oxygen Therapy  O2 Sat (%): 93 % (02/07/22 1139)  Pulse via Oximetry: 61 beats per minute (02/07/22 0938)  O2 Device: Hi flow nasal cannula; Heated (02/07/22 4394)  Skin Assessment: Clean, dry, & intact (02/06/22 1005)  O2 Flow Rate (L/min): 65 l/min (02/07/22 0938)  O2 Temperature: 87.8 °F (31 °C) (02/06/22 1005)  FIO2 (%): 66 % (02/07/22 0938)    Estimated body mass index is 27.62 kg/m² as calculated from the following:    Height as of this encounter: 5' 1\" (1.549 m). Weight as of this encounter: 66.3 kg (146 lb 3.2 oz). Intake/Output Summary (Last 24 hours) at 2/7/2022 1519  Last data filed at 2/7/2022 0938  Gross per 24 hour   Intake 460 ml   Output 500 ml   Net -40 ml         Physical Exam:     Blood pressure 109/63, pulse (!) 53, temperature 97.4 °F (36.3 °C), resp. rate 20, height 5' 1\" (1.549 m), weight 66.3 kg (146 lb 3.2 oz), SpO2 93 %. General:    Well nourished. Mild resp distress on 66 % fio2,airvo  Head:  Normocephalic, atraumatic  Eyes:  Sclerae appear normal.  Pupils equally round. ENT:  Nares appear normal, no drainage. Moist oral mucosa  Neck:  No restricted ROM. Trachea midline   CV:   RRR. No m/r/g. No jugular venous distension. Lungs:   Bilateral coarse breath sounds  Abdomen: Bowel sounds present. Soft, nontender, nondistended. Extremities: No cyanosis or clubbing. No edema  Skin:     No rashes and normal coloration. Warm and dry. Neuro:  CN II-XII grossly intact. Sensation intact. A&Ox3  Psych:  Normal mood and affect.       I have reviewed ordered lab tests and independently visualized imaging below:    Recent Labs:  Recent Results (from the past 48 hour(s))   METABOLIC PANEL, COMPREHENSIVE    Collection Time: 02/06/22  4:16 AM   Result Value Ref Range    Sodium 141 136 - 145 mmol/L    Potassium 4.3 3.5 - 5.1 mmol/L    Chloride 104 98 - 107 mmol/L    CO2 34 (H) 21 - 32 mmol/L    Anion gap 3 (L) 7 - 16 mmol/L    Glucose 95 65 - 100 mg/dL    BUN 41 (H) 8 - 23 MG/DL    Creatinine 0.80 0.6 - 1.0 MG/DL    GFR est AA >60 >60 ml/min/1.73m2    GFR est non-AA >60 >60 ml/min/1.73m2    Calcium 8.9 8.3 - 10.4 MG/DL Bilirubin, total 0.4 0.2 - 1.1 MG/DL    ALT (SGPT) 25 12 - 65 U/L    AST (SGOT) 27 15 - 37 U/L    Alk. phosphatase 73 50 - 136 U/L    Protein, total 5.1 (L) 6.3 - 8.2 g/dL    Albumin 2.7 (L) 3.2 - 4.6 g/dL    Globulin 2.4 2.3 - 3.5 g/dL    A-G Ratio 1.1 (L) 1.2 - 3.5     METABOLIC PANEL, BASIC    Collection Time: 02/07/22  7:03 AM   Result Value Ref Range    Sodium 140 136 - 145 mmol/L    Potassium 4.6 3.5 - 5.1 mmol/L    Chloride 103 98 - 107 mmol/L    CO2 34 (H) 21 - 32 mmol/L    Anion gap 3 (L) 7 - 16 mmol/L    Glucose 104 (H) 65 - 100 mg/dL    BUN 37 (H) 8 - 23 MG/DL    Creatinine 0.60 0.6 - 1.0 MG/DL    GFR est AA >60 >60 ml/min/1.73m2    GFR est non-AA >60 >60 ml/min/1.73m2    Calcium 8.7 8.3 - 10.4 MG/DL       All Micro Results     Procedure Component Value Units Date/Time    CULTURE, URINE [[id number]]  (Abnormal)  (Susceptibility) Collected: 01/28/22 1105    Order Status: Completed Specimen: Urine from Clean catch Updated: 01/30/22 0843     Special Requests: NO SPECIAL REQUESTS        Culture result:       >100,000 COLONIES/mL ESCHERICHIA COLI                  <1,000 CFU/ML MIXED SKIN SCOTT ISOLATED          COVID-19 RAPID TEST [[id number]]  (Abnormal) Collected: 01/26/22 1258    Order Status: Completed Specimen: Nasopharyngeal Updated: 01/26/22 1316     Specimen source NASAL        COVID-19 rapid test Detected        Comment:      The specimen is POSITIVE for SARS-CoV-2, the novel coronavirus associated with COVID-19. This test has been authorized by the FDA under an Emergency Use Authorization (EUA) for use by authorized laboratories. Fact sheet for Healthcare Providers: ConventionUpdate.co.nz  Fact sheet for Patients: ConventionUpdate.co.nz       Methodology: Isothermal Nucleic Acid Amplification               Other Studies:  No results found.     Current Meds:  Current Facility-Administered Medications   Medication Dose Route Frequency    lidocaine 4 % patch 1 Patch  1 Patch TransDERmal Q24H    gabapentin (NEURONTIN) capsule 800 mg  800 mg Oral BID    ALPRAZolam (XANAX) tablet 0.25 mg  0.25 mg Oral QHS    escitalopram oxalate (LEXAPRO) tablet 5 mg  5 mg Oral QPM    melatonin tablet 5 mg  5 mg Oral QHS    baricitinib (OLUMIANT) tablet 4 mg  4 mg Oral DAILY    lip protectant (BLISTEX) ointment 1 Each  1 Each Topical PRN    apixaban (ELIQUIS) tablet 5 mg  5 mg Oral BID    metoprolol tartrate (LOPRESSOR) tablet 50 mg  50 mg Oral Q12H    traMADoL (ULTRAM) tablet 100 mg  100 mg Oral Q8H PRN    dexlansoprazole (DEXILANT) capsule (delayed release) CpDB 60 mg (Patient Supplied)  60 mg Oral DAILY    sodium chloride (NS) flush 5-10 mL  5-10 mL IntraVENous Q8H    sodium chloride (NS) flush 5-10 mL  5-10 mL IntraVENous PRN    sodium chloride (NS) flush 5-40 mL  5-40 mL IntraVENous Q8H    sodium chloride (NS) flush 5-40 mL  5-40 mL IntraVENous PRN    acetaminophen (TYLENOL) tablet 650 mg  650 mg Oral Q6H PRN    Or    acetaminophen (TYLENOL) suppository 650 mg  650 mg Rectal Q6H PRN    polyethylene glycol (MIRALAX) packet 17 g  17 g Oral DAILY PRN    ondansetron (ZOFRAN ODT) tablet 4 mg  4 mg Oral Q8H PRN    Or    ondansetron (ZOFRAN) injection 4 mg  4 mg IntraVENous Q6H PRN    guaiFENesin-dextromethorphan (ROBITUSSIN DM) 100-10 mg/5 mL syrup 5 mL  5 mL Oral Q4H PRN    cholecalciferol (VITAMIN D3) (1000 Units /25 mcg) tablet 2,000 Units  2,000 Units Oral DAILY    alum-mag hydroxide-simeth (MYLANTA) oral suspension 15 mL  15 mL Oral Q6H PRN    albuterol (PROVENTIL HFA, VENTOLIN HFA, PROAIR HFA) inhaler 2 Puff  2 Puff Inhalation Q4H PRN    atorvastatin (LIPITOR) tablet 40 mg  40 mg Oral QHS    temazepam (RESTORIL) capsule 15 mg  15 mg Oral QHS PRN       Signed:  Gali Fernandes MD

## 2022-02-07 NOTE — PROGRESS NOTES
ACUTE PHYSICAL THERAPY GOALS:  (Developed with and agreed upon by patient and/or caregiver. )    LTG:  (1.)Ms. Fitzgerald will move from supine to sit and sit to supine , scoot up and down and roll side to side in flat bed without siderails with  INDEPENDENT within 7 day(s). (2.)Ms. Fitzgerald will perform all functional transfers with  MODIFIED INDEPENDENCE using the least restrictive/no device within 7 day(s). (3.)Ms. Fitzgerald will ambulate with  CONTACT GUARD ASSIST for 100+ feet with normal vital sign response with the least restrictive/no device within 7 day(s). PHYSICAL THERAPY: Daily Note and PM Treatment Day # 6    Estrella Baugh is a 80 y.o. female   PRIMARY DIAGNOSIS: Acute hypoxemic respiratory failure due to COVID-19 [facility])  [facility] [U07.1, J96.01]         ASSESSMENT:     REHAB RECOMMENDATIONS: CURRENT LEVEL OF FUNCTION:  (Most Recently Demonstrated)   Recommendation to date pending progress:  [address] [place] [address] Therapy  Equipment:    To Be Determined Bed Mobility:   Contact Guard Assistance  Sit to Stand:   Contact Guard Assistance  Transfers:   Contact Guard Assistance  Gait/Mobility:   Contact Guard Assistance     ASSESSMENT:  Ms. Palak Manzo was supine and agreeable to work with PT/OT but limited. She sat up and worked on breathing to get sat up. Transferred to [facility] and was independent with self care. Performed standing balance and LE ex. Returned supine. Left with needs in reach. SUBJECTIVE:   Ms. Palak Manzo states \"I'm feeling better today\". SOCIAL HISTORY/ LIVING ENVIRONMENT: Ms. Palak Manzo lives with her , son, and DIL. She ambulates independently and is primarily homebound.   Support Systems: Spouse/Significant Other,Child(sky)  OBJECTIVE:     PAIN: VITAL SIGNS: LINES/DRAINS:   Pre Treatment:  0  Post Treatment: 0  Low 90's high 80's throughout Continuous Pulse Oximetry  O2 Device: Hi flow nasal cannula,Heated       MOBILITY: I Mod I S SBA CGA Min Mod Max Total  NT x2 Comments:   Bed Mobility    Rolling [] [] [] [] [] [] [] [] [] [x] []    Supine to Sit [] [] [] [] [x] [] [] [] [] [] []    Scooting [] [] [] [] [x] [] [] [] [] [] []    Sit to Supine [] [] [] [] [x] [] [] [] [] [] []    Transfers    Sit to Stand [] [] [] [] [x] [] [] [] [] [] []    Bed to Chair [] [] [] [] [] [] [] [] [] [] []    Stand to Sit [] [] [] [] [x] [] [] [] [] [] []    I=Independent, Mod I=Modified Independent, S=Supervision, SBA=Standby Assistance, CGA=Contact Guard Assistance,   Min=Minimal Assistance, Mod=Moderate Assistance, Max=Maximal Assistance, Total=Total Assistance, NT=Not Tested    BALANCE: Good Fair+ Fair Fair- Poor NT Comments   Sitting Static [] [x] [] [] [] []    Sitting Dynamic [] [x] [] [] [] []              Standing Static [] [x] [] [] [] []    Standing Dynamic [] [x] [] [] [] []      GAIT: I Mod I S SBA CGA Min Mod Max Total  NT x2 Comments:   Level of Assistance [] [] [] [] [x] [] [] [] [] [] []    Distance 3'     DME Rolling Walker    Gait Quality     Weightbearing  Status N/A     I=Independent, Mod I=Modified Independent, S=Supervision, SBA=Standby Assistance, CGA=Contact Guard Assistance,   Min=Minimal Assistance, Mod=Moderate Assistance, Max=Maximal Assistance, Total=Total Assistance, NT=Not Tested    PLAN:   FREQUENCY/DURATION: PT Plan of Care: 3 times/week for duration of hospital stay or until stated goals are met, whichever comes first.  TREATMENT:     TREATMENT:   ($$ Therapeutic Activity: 23-37 mins    )  Therapeutic Activity (30 Minutes): Therapeutic activity included Supine to Sit, Scooting, Ambulation on level ground, Sitting balance , Standing balance and LE ex to improve functional Mobility, Strength and Activity tolerance.     TREATMENT GRID:  N/A    AFTER TREATMENT POSITION/PRECAUTIONS:  Bed, Needs within reach and RN notified    INTERDISCIPLINARY COLLABORATION:  RN/PCT    TOTAL TREATMENT DURATION:  PT Patient Time In/Time Out  Time In: 1800  Time Out: Elizabet Sow [address] [place] [address]

## 2022-02-07 NOTE — PROGRESS NOTES
Alert and oriented  No complaint of pain  Dyspnea with exertion  Says feels better this morning  Oxygen via optiflow 31L 60%

## 2022-02-07 NOTE — PROGRESS NOTES
Patient in bed resting. Respirations even and unlabored. On airvo 30L/60%. No needs expressed. Safety measures in place. Call light in reach. No signs of acute distress at this time. Will continue to monitor. Preparing to give report to oncoming RN.

## 2022-02-08 LAB
ALBUMIN SERPL-MCNC: 2.6 G/DL (ref 3.2–4.6)
ALBUMIN/GLOB SERPL: 0.9 {RATIO} (ref 1.2–3.5)
ALP SERPL-CCNC: 66 U/L (ref 50–136)
ALT SERPL-CCNC: 24 U/L (ref 12–65)
ANION GAP SERPL CALC-SCNC: 5 MMOL/L (ref 7–16)
AST SERPL-CCNC: 27 U/L (ref 15–37)
BILIRUB SERPL-MCNC: 0.4 MG/DL (ref 0.2–1.1)
BUN SERPL-MCNC: 28 MG/DL (ref 8–23)
CALCIUM SERPL-MCNC: 8.7 MG/DL (ref 8.3–10.4)
CHLORIDE SERPL-SCNC: 101 MMOL/L (ref 98–107)
CO2 SERPL-SCNC: 34 MMOL/L (ref 21–32)
CREAT SERPL-MCNC: 0.7 MG/DL (ref 0.6–1)
GLOBULIN SER CALC-MCNC: 2.8 G/DL (ref 2.3–3.5)
GLUCOSE SERPL-MCNC: 95 MG/DL (ref 65–100)
POTASSIUM SERPL-SCNC: 4.3 MMOL/L (ref 3.5–5.1)
PROT SERPL-MCNC: 5.4 G/DL (ref 6.3–8.2)
SODIUM SERPL-SCNC: 140 MMOL/L (ref 136–145)

## 2022-02-08 PROCEDURE — 80053 COMPREHEN METABOLIC PANEL: CPT

## 2022-02-08 PROCEDURE — 94762 N-INVAS EAR/PLS OXIMTRY CONT: CPT

## 2022-02-08 PROCEDURE — 74011250636 HC RX REV CODE- 250/636: Performed by: FAMILY MEDICINE

## 2022-02-08 PROCEDURE — 74011000250 HC RX REV CODE- 250: Performed by: STUDENT IN AN ORGANIZED HEALTH CARE EDUCATION/TRAINING PROGRAM

## 2022-02-08 PROCEDURE — 74011250637 HC RX REV CODE- 250/637: Performed by: FAMILY MEDICINE

## 2022-02-08 PROCEDURE — 74011000250 HC RX REV CODE- 250: Performed by: FAMILY MEDICINE

## 2022-02-08 PROCEDURE — 65660000000 HC RM CCU STEPDOWN

## 2022-02-08 PROCEDURE — 74011250637 HC RX REV CODE- 250/637: Performed by: STUDENT IN AN ORGANIZED HEALTH CARE EDUCATION/TRAINING PROGRAM

## 2022-02-08 PROCEDURE — 36415 COLL VENOUS BLD VENIPUNCTURE: CPT

## 2022-02-08 PROCEDURE — 74011250637 HC RX REV CODE- 250/637: Performed by: NURSE PRACTITIONER

## 2022-02-08 PROCEDURE — 77010033711 HC HIGH FLOW OXYGEN

## 2022-02-08 RX ORDER — MIDODRINE HYDROCHLORIDE 5 MG/1
5 TABLET ORAL
Status: DISCONTINUED | OUTPATIENT
Start: 2022-02-08 | End: 2022-02-14

## 2022-02-08 RX ORDER — FUROSEMIDE 10 MG/ML
20 INJECTION INTRAMUSCULAR; INTRAVENOUS ONCE
Status: DISCONTINUED | OUTPATIENT
Start: 2022-02-08 | End: 2022-02-08

## 2022-02-08 RX ADMIN — BARICITINIB 4 MG: 2 TABLET, FILM COATED ORAL at 09:05

## 2022-02-08 RX ADMIN — MIDODRINE HYDROCHLORIDE 5 MG: 5 TABLET ORAL at 16:14

## 2022-02-08 RX ADMIN — SODIUM CHLORIDE, PRESERVATIVE FREE 10 ML: 5 INJECTION INTRAVENOUS at 22:38

## 2022-02-08 RX ADMIN — GABAPENTIN 800 MG: 400 CAPSULE ORAL at 09:05

## 2022-02-08 RX ADMIN — SODIUM CHLORIDE, PRESERVATIVE FREE 10 ML: 5 INJECTION INTRAVENOUS at 05:40

## 2022-02-08 RX ADMIN — SODIUM CHLORIDE, PRESERVATIVE FREE 10 ML: 5 INJECTION INTRAVENOUS at 13:09

## 2022-02-08 RX ADMIN — VITAMIN D, TAB 1000IU (100/BT) 2000 UNITS: 25 TAB at 09:05

## 2022-02-08 RX ADMIN — METOPROLOL TARTRATE 50 MG: 50 TABLET, FILM COATED ORAL at 09:04

## 2022-02-08 RX ADMIN — Medication 5 MG: at 22:27

## 2022-02-08 RX ADMIN — ALPRAZOLAM 0.25 MG: 0.5 TABLET ORAL at 22:26

## 2022-02-08 RX ADMIN — GABAPENTIN 800 MG: 400 CAPSULE ORAL at 17:22

## 2022-02-08 RX ADMIN — APIXABAN 5 MG: 5 TABLET, FILM COATED ORAL at 22:26

## 2022-02-08 RX ADMIN — TEMAZEPAM 15 MG: 15 CAPSULE ORAL at 22:44

## 2022-02-08 RX ADMIN — SODIUM CHLORIDE 250 ML: 900 INJECTION, SOLUTION INTRAVENOUS at 16:03

## 2022-02-08 RX ADMIN — APIXABAN 5 MG: 5 TABLET, FILM COATED ORAL at 09:04

## 2022-02-08 RX ADMIN — ATORVASTATIN CALCIUM 40 MG: 40 TABLET, FILM COATED ORAL at 22:27

## 2022-02-08 RX ADMIN — SODIUM CHLORIDE, PRESERVATIVE FREE 10 ML: 5 INJECTION INTRAVENOUS at 13:08

## 2022-02-08 RX ADMIN — DEXLANSOPRAZOLE 60 MG: 60 CAPSULE, DELAYED RELEASE ORAL at 09:10

## 2022-02-08 RX ADMIN — ESCITALOPRAM OXALATE 5 MG: 10 TABLET ORAL at 17:22

## 2022-02-08 NOTE — PROGRESS NOTES
Report received from Francy Jules, [facility]. Patient in bed resting. Respirations even and unlabored. On airvo 30L/60%. No needs expressed. Safety measures in place. Call light in reach. No signs  Of acute distress at this time. Will continue to monitor.

## 2022-02-08 NOTE — PROGRESS NOTES
Hospitalist Progress Note   Admit Date:  2022 12:32 PM   Name:  Suleiman Kelsey   Age:  80 y.o. Sex:  female  :  1935   MRN:  [id number]   Room:  [facility]/    Presenting Complaint: Positive For Covid-19    Reason(s) for Admission: Acute hypoxemic respiratory failure due to COVID-19 [facility]) [U07.1, J96.01]     Hospital Course & Interval History:   Sharyn Bazzi a 80 y.o. female with medical history of HTN, mixed HLD, GERD, Glaucoma, Hay fever,  HLD, insomnia who presented from home via EMS with hypoxia and AMS with recent diagnosis of COVID-19. EMS reports patient was hypoxic on arrival, O2 saturation in the 60s.  Placed on 6 L and given terbutaline.  EMS also noted family some concern for patient being more altered than normal.     In ED, T102 BP 91/56    spo2 74% RA, 85% 6L NC, 91% 55L/m  LA 3.9 CRP 17.3 D dimer 3.85 NT pro BNP 3481 HS trop 1544>1609   CXR BL infiltrates   rec'd Tylenol 1000 mg and decadron 10 mg IV      EKG shows sinus tachycardia, rate 115, , QRS 92, QTc 491     She is alert and oriented to person, place  but not month or year. Unable to get in touch with either son or . She notes symptom onset 5 days ago. Patient denies CP or palpitations, nausea, vomiting, diarrhea, fevers. +chills, +cough. Says  is sick. Discussed Actemra with patient including risks and benefits.      satting 94% on 55/90% airvo.     Subjective (22):  2/3/2022  Wants to go home,pt on fio2 80%  2022  Complaining of being anxious, decreased sleep manic depressed, no suicidal ideation, on Airvo with FiO2 of 85%, oxygen saturation ranging from 95 to 100%. 22  On Airvo FiO2 55%, says breathing better, did not sleep at bed intermittently last night, spoke to son on phone. Wants to go home.     2022  Wants to go home, complaining of back pain, says did not have good sleep, patient presently on Airvo with FiO2 of 55 this morning, weaned to 50 later on this afternoon, on flow at 35 L/min  Spoke to family on phone in presence of patient    2/7/2022  Wants to go home  Says breathing better  On Airvo with FiO2 of 66%    2/8/2022  FiO2 of 60%, flow 55 L/min    Assessment & Plan:   Acute Hypoxemic Respiratory Failure 2/2 COVID-19 PNA   Patient is saturating well on a low 50 L/MIN  Suspected Cytokine Storm with elevated markers   - COVID + 1/16 ,  Unvaccinated   - Decadron 6 mg x 10 doses   -Continue Bacitricininb  2/3/2022- on airvo at 80% fio2  2/4/2022-on Airvo at 85% FiO2, probably can wean as oxygen saturation ranging from 95 -100%  2/5/22-on Airvo, FiO2 55%  2/6/2022-on Airvo at FiO2 50%, finished course of Decadron on 2/4/2022, presently on baricitinib  2/7/2022-Airvo FiO2 66%  2/8/2022-FiO2 of 60%, flow 55 L/min    uti- e.coli- on rocephin  2/6/2022-finished a course of Rocephin for UTI    Insomnia/anxiety/depression  No suicidal ideation  We will start on Lexapro and melatonin  2/6/22-we will add as needed Xanax 25 mg nightly        New onset Afib  EKG showed A. fib with RVR with HR 170s  Troponins were elevated and down trended  Lilian vasc score of 4 and stroke risk of 4.8% per yr  Continue  metoprolol  Continue Eliquis   echo with normal EF   Cardiology signed off  -    Sepsis 2/2 COVID -   Resolved   admin 30 cc/kg bolus. 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 Pro calcitonin elevated 1.15  Ct chest showed covid and no PE         Troponemia  Cardiac enzymes downtrending. EKG does not demonstrate ST segment elevation. Cardiology consulted Hypercoagulable state associated with COVID-19 -      Hypokalemia // Hypomagnesemia - associated with prolonged QTc   Resolved     Acute Encephalopathy -  Resolved        Chronic insomnia - takes temazepam 30 mg nightly for this. At risk of withdrawal. Reduce dose to 15 mg as needed.      Lumbar spondylosis - f/b pain management.  Takes gabapentin 800 mg BID, tramadol 100 mg BID. Reduce gabapentin 300 mg TID prn, hold tramadol for now. 2/6/2022-complaining of back pain, will increase gabapentin to her home dosage of 800 mg twice daily, added lidocaine patch        HLD - continue  atorvastatin      H/o HTN - Normotensive Monitor      GERD - resume protonix.           Diet:  ADULT DIET Easy to Chew; Low Fat/Low Chol/High Fiber/EVA  DVT PPx: Eliquis  Code status: Full Code                Hospital Problems as of 2/8/2022 Date Reviewed: 4/12/2018          Codes Class Noted - Resolved POA    UTI (urinary tract infection) ICD-10-CM: N39.0  ICD-9-CM: 599.0  2/3/2022 - Present Unknown        * (Principal) Acute hypoxemic respiratory failure due to COVID-19 [facility]) ICD-10-CM: U07.1, J96.01  ICD-9-CM: 518.81, 079.89, 799.02  1/26/2022 - Present Yes        Sepsis due to COVID-19 [facility]) ICD-10-CM: U07.1, A41.89  ICD-9-CM: 079.89, 995.91  1/26/2022 - Present Yes        Hypomagnesemia ICD-10-CM: E83.42  ICD-9-CM: 275.2  1/26/2022 - Present Yes        Hypokalemia ICD-10-CM: E87.6  ICD-9-CM: 276.8  1/26/2022 - Present Yes        Prolonged Q-T interval on ECG ICD-10-CM: R94.31  ICD-9-CM: 794.31  1/26/2022 - Present Yes        Elevated troponin ICD-10-CM: R77.8  ICD-9-CM: 790.6  1/26/2022 - Present Yes        Toxic metabolic encephalopathy CDU-84-MC: G92.8  ICD-9-CM: 349.82  1/26/2022 - Present Yes        Chronic prescription benzodiazepine use ICD-10-CM: Z79.899  ICD-9-CM: V58.69  1/26/2022 - Present Yes        Hypercoagulable state associated with COVID-19 ([facility] [address] ICD-10-CM: U07.1, D68.69  ICD-9-CM: 289.82, 079.89  1/26/2022 - Present Yes        Transaminitis ICD-10-CM: R74.01  ICD-9-CM: 790.4  1/26/2022 - Present Yes        Gastroesophageal reflux disease without esophagitis ICD-10-CM: K21.9  ICD-9-CM: 530.81  9/28/2017 - Present Yes    Overview Signed 1/9/2018 10:12 AM by Nik Woodson DO     Last Assessment & Plan:   Stable.              Essential (primary) hypertension ICD-10-CM: I10  ICD-9-CM: 401.9  10/28/2016 - Present Yes    Overview Signed 1/9/2018 10:12 AM by Marina Potts DO     Last Assessment & Plan:   Stable. Hyperlipidemia ICD-10-CM: E78.5  ICD-9-CM: 272.4  10/28/2016 - Present Yes    Overview Signed 1/9/2018 10:12 AM by Marina Potts DO     Last Assessment & Plan:   Return for fasting labs. Insomnia, persistent ICD-10-CM: G47.00  ICD-9-CM: 307.42  4/27/2016 - Present Yes    Overview Signed 1/9/2018 10:12 AM by Marina Potts DO     Last Assessment & Plan:   Restoril prescription printed & given. Primary osteoarthritis involving multiple joints ICD-10-CM: M89.49  ICD-9-CM: 715.98  7/31/2015 - Present Yes    Overview Signed 1/9/2018 10:12 AM by Marina Potts DO     Last Assessment & Plan:   3 Norco scripts printed with do not fill before dates of 10/10, 11/9, 12/9. Tramadol prescription printed & given (decreased from 150 to 120 tablets per month based on how patient is taking it). Aware of risk of sedation and aware to get prescriptions only in our office. Discussed polypharmacy & fall risk with patient's multiple sedating medications, encouraged to limit use as much as possible. Will have her see Dr. Alton Rodriguez for next follow up to review pain medication regimen. Pt verbalized understanding, agreed with plan.                    Objective:     Patient Vitals for the past 24 hrs:   Temp Pulse Resp BP SpO2   02/08/22 1511 97.5 °F (36.4 °C) (!) 53 16 (!) 87/59 99 %   02/08/22 1059 97.6 °F (36.4 °C) (!) 57 18 103/60 94 %   02/08/22 0827 -- -- -- -- 99 %   02/08/22 0751 97.6 °F (36.4 °C) (!) 49 16 110/63 100 %   02/08/22 0400 -- (!) 44 -- -- --   02/08/22 0336 97.9 °F (36.6 °C) (!) 47 18 91/71 98 %   02/08/22 0017 97.4 °F (36.3 °C) (!) 50 -- (!) 96/58 --   02/07/22 2035 -- (!) 51 -- (!) 94/57 --   02/07/22 2034 -- -- -- -- 98 %   02/07/22 2000 -- (!) 49 -- -- --   02/07/22 1959 97.8 °F (36.6 °C) (!) 50 18 (!) 92/54 99 %   02/07/22 1720 -- -- -- -- 99 %     Oxygen Therapy  O2 Sat (%): 99 % (02/08/22 1511)  Pulse via Oximetry: 61 beats per minute (02/07/22 0938)  O2 Device: Heated; Hi flow nasal cannula (02/08/22 0827)  Skin Assessment: Clean, dry, & intact (02/08/22 0750)  Skin Protection for O2 Device: No (02/08/22 0750)  O2 Flow Rate (L/min): 55 l/min (02/08/22 0827)  O2 Temperature: 87.8 °F (31 °C) (02/08/22 0750)  FIO2 (%): 60 % (02/08/22 0827)    Estimated body mass index is 25.92 kg/m² as calculated from the following:    Height as of this encounter: 5' 1\" (1.549 m). Weight as of this encounter: 62.2 kg (137 lb 3.2 oz). Intake/Output Summary (Last 24 hours) at 2/8/2022 1550  Last data filed at 2/8/2022 1456  Gross per 24 hour   Intake 700 ml   Output 500 ml   Net 200 ml         Physical Exam:     Blood pressure (!) 87/59, pulse (!) 53, temperature 97.5 °F (36.4 °C), resp. rate 16, height 5' 1\" (1.549 m), weight 62.2 kg (137 lb 3.2 oz), SpO2 99 %. General:    Well nourished. Mild resp distress on 60 % fio2,airvo  Head:  Normocephalic, atraumatic  Eyes:  Sclerae appear normal.  Pupils equally round. ENT:  Nares appear normal, no drainage. Moist oral mucosa  Neck:  No restricted ROM. Trachea midline   CV:   RRR. No m/r/g. No jugular venous distension. Lungs:   Bilateral coarse breath sounds  Abdomen: Bowel sounds present. Soft, nontender, nondistended. Extremities: No cyanosis or clubbing. No edema  Skin:     No rashes and normal coloration. Warm and dry. Neuro:  CN II-XII grossly intact. Sensation intact. A&Ox3  Psych:  Normal mood and affect.       I have reviewed ordered lab tests and independently visualized imaging below:    Recent Labs:  Recent Results (from the past 48 hour(s))   METABOLIC PANEL, BASIC    Collection Time: 02/07/22  7:03 AM   Result Value Ref Range    Sodium 140 136 - 145 mmol/L    Potassium 4.6 3.5 - 5.1 mmol/L    Chloride 103 98 - 107 mmol/L    CO2 34 (H) 21 - 32 mmol/L    Anion gap 3 (L) 7 - 16 mmol/L    Glucose 104 (H) 65 - 100 mg/dL    BUN 37 (H) 8 - 23 MG/DL    Creatinine 0.60 0.6 - 1.0 MG/DL    GFR est AA >60 >60 ml/min/1.73m2    GFR est non-AA >60 >60 ml/min/1.73m2    Calcium 8.7 8.3 - 18.9 MG/DL   METABOLIC PANEL, COMPREHENSIVE    Collection Time: 02/08/22  7:33 AM   Result Value Ref Range    Sodium 140 136 - 145 mmol/L    Potassium 4.3 3.5 - 5.1 mmol/L    Chloride 101 98 - 107 mmol/L    CO2 34 (H) 21 - 32 mmol/L    Anion gap 5 (L) 7 - 16 mmol/L    Glucose 95 65 - 100 mg/dL    BUN 28 (H) 8 - 23 MG/DL    Creatinine 0.70 0.6 - 1.0 MG/DL    GFR est AA >60 >60 ml/min/1.73m2    GFR est non-AA >60 >60 ml/min/1.73m2    Calcium 8.7 8.3 - 10.4 MG/DL    Bilirubin, total 0.4 0.2 - 1.1 MG/DL    ALT (SGPT) 24 12 - 65 U/L    AST (SGOT) 27 15 - 37 U/L    Alk. phosphatase 66 50 - 136 U/L    Protein, total 5.4 (L) 6.3 - 8.2 g/dL    Albumin 2.6 (L) 3.2 - 4.6 g/dL    Globulin 2.8 2.3 - 3.5 g/dL    A-G Ratio 0.9 (L) 1.2 - 3.5         All Micro Results     Procedure Component Value Units Date/Time    CULTURE, URINE [[id number]]  (Abnormal)  (Susceptibility) Collected: 01/28/22 1105    Order Status: Completed Specimen: Urine from Clean catch Updated: 01/30/22 0893     Special Requests: NO SPECIAL REQUESTS        Culture result:       >100,000 COLONIES/mL ESCHERICHIA COLI                  <1,000 CFU/ML MIXED SKIN SCOTT ISOLATED          COVID-19 RAPID TEST [[id number]]  (Abnormal) Collected: 01/26/22 1258    Order Status: Completed Specimen: Nasopharyngeal Updated: 01/26/22 1316     Specimen source NASAL        COVID-19 rapid test Detected        Comment:      The specimen is POSITIVE for SARS-CoV-2, the novel coronavirus associated with COVID-19. This test has been authorized by the FDA under an Emergency Use Authorization (EUA) for use by authorized laboratories.         Fact sheet for Healthcare Providers: ConventionUpdate.co.nz  Fact sheet for Patients: [facility] [email or web address].[email or web address][facility]       Methodology: Isothermal Nucleic Acid Amplification               Other Studies:  No results found.     Current Meds:  Current Facility-Administered Medications   Medication Dose Route Frequency    midodrine (PROAMATINE) tablet 5 mg  5 mg Oral TID WITH MEALS    sodium chloride 0.9 % bolus infusion 250 mL  250 mL IntraVENous ONCE    lidocaine 4 % patch 1 Patch  1 Patch TransDERmal Q24H    gabapentin (NEURONTIN) capsule 800 mg  800 mg Oral BID    ALPRAZolam (XANAX) tablet 0.25 mg  0.25 mg Oral QHS    escitalopram oxalate (LEXAPRO) tablet 5 mg  5 mg Oral QPM    melatonin tablet 5 mg  5 mg Oral QHS    baricitinib (OLUMIANT) tablet 4 mg  4 mg Oral DAILY    lip protectant (BLISTEX) ointment 1 Each  1 Each Topical PRN    apixaban (ELIQUIS) tablet 5 mg  5 mg Oral BID    metoprolol tartrate (LOPRESSOR) tablet 50 mg  50 mg Oral Q12H    traMADoL (ULTRAM) tablet 100 mg  100 mg Oral Q8H PRN    dexlansoprazole (DEXILANT) capsule (delayed release) CpDB 60 mg (Patient Supplied)  60 mg Oral DAILY    sodium chloride (NS) flush 5-10 mL  5-10 mL IntraVENous Q8H    sodium chloride (NS) flush 5-10 mL  5-10 mL IntraVENous PRN    sodium chloride (NS) flush 5-40 mL  5-40 mL IntraVENous Q8H    sodium chloride (NS) flush 5-40 mL  5-40 mL IntraVENous PRN    acetaminophen (TYLENOL) tablet 650 mg  650 mg Oral Q6H PRN    Or    acetaminophen (TYLENOL) suppository 650 mg  650 mg Rectal Q6H PRN    polyethylene glycol (MIRALAX) packet 17 g  17 g Oral DAILY PRN    ondansetron (ZOFRAN ODT) tablet 4 mg  4 mg Oral Q8H PRN    Or    ondansetron (ZOFRAN) injection 4 mg  4 mg IntraVENous Q6H PRN    guaiFENesin-dextromethorphan (ROBITUSSIN DM) 100-10 mg/5 mL syrup 5 mL  5 mL Oral Q4H PRN    cholecalciferol (VITAMIN D3) (1000 Units /25 mcg) tablet 2,000 Units  2,000 Units Oral DAILY    alum-mag hydroxide-simeth (MYLANTA) oral suspension 15 mL  15 mL Oral Q6H PRN    albuterol (PROVENTIL HFA, VENTOLIN HFA, PROAIR HFA) inhaler 2 Puff  2 Puff Inhalation Q4H PRN    atorvastatin (LIPITOR) tablet 40 mg  40 mg Oral QHS    temazepam (RESTORIL) capsule 15 mg  15 mg Oral QHS PRN       Signed:  Ed Ware MD

## 2022-02-08 NOTE — PROGRESS NOTES
PT Note:  Treatment attempted this AM but pt was adamantly refusing despite encouragement. Will re-attempt pending schedule and pt status.   Thanks,  Jeromy Pulido, PT, DPT

## 2022-02-08 NOTE — PROGRESS NOTES
OT note:  Pt refused to participate in therapy despite encouragement and education on why therapy is beneficial and necessary. Pt stated that she did not care and she was not doing anything unless she wanted to.    Mateo Reynolds

## 2022-02-09 PROBLEM — I95.9 HYPOTENSION: Status: ACTIVE | Noted: 2022-02-09

## 2022-02-09 LAB
ALBUMIN SERPL-MCNC: 2.5 G/DL (ref 3.2–4.6)
ALBUMIN/GLOB SERPL: 0.9 {RATIO} (ref 1.2–3.5)
ALP SERPL-CCNC: 84 U/L (ref 50–136)
ALT SERPL-CCNC: 28 U/L (ref 12–65)
ANION GAP SERPL CALC-SCNC: 1 MMOL/L (ref 7–16)
AST SERPL-CCNC: 28 U/L (ref 15–37)
BASOPHILS # BLD: 0 K/UL (ref 0–0.2)
BASOPHILS NFR BLD: 0 % (ref 0–2)
BILIRUB SERPL-MCNC: 0.3 MG/DL (ref 0.2–1.1)
BUN SERPL-MCNC: 32 MG/DL (ref 8–23)
CALCIUM SERPL-MCNC: 8.6 MG/DL (ref 8.3–10.4)
CHLORIDE SERPL-SCNC: 101 MMOL/L (ref 98–107)
CO2 SERPL-SCNC: 37 MMOL/L (ref 21–32)
CREAT SERPL-MCNC: 0.8 MG/DL (ref 0.6–1)
DIFFERENTIAL METHOD BLD: ABNORMAL
EOSINOPHIL # BLD: 0.2 K/UL (ref 0–0.8)
EOSINOPHIL NFR BLD: 3 % (ref 0.5–7.8)
ERYTHROCYTE [DISTWIDTH] IN BLOOD BY AUTOMATED COUNT: 13.7 % (ref 11.9–14.6)
GLOBULIN SER CALC-MCNC: 2.8 G/DL (ref 2.3–3.5)
GLUCOSE SERPL-MCNC: 122 MG/DL (ref 65–100)
HCT VFR BLD AUTO: 32.5 % (ref 35.8–46.3)
HGB BLD-MCNC: 10.3 G/DL (ref 11.7–15.4)
IMM GRANULOCYTES # BLD AUTO: 0 K/UL (ref 0–0.5)
IMM GRANULOCYTES NFR BLD AUTO: 1 % (ref 0–5)
LYMPHOCYTES # BLD: 1.7 K/UL (ref 0.5–4.6)
LYMPHOCYTES NFR BLD: 31 % (ref 13–44)
MCH RBC QN AUTO: 29.2 PG (ref 26.1–32.9)
MCHC RBC AUTO-ENTMCNC: 31.7 G/DL (ref 31.4–35)
MCV RBC AUTO: 92.1 FL (ref 79.6–97.8)
MONOCYTES # BLD: 0.7 K/UL (ref 0.1–1.3)
MONOCYTES NFR BLD: 12 % (ref 4–12)
NEUTS SEG # BLD: 2.9 K/UL (ref 1.7–8.2)
NEUTS SEG NFR BLD: 53 % (ref 43–78)
NRBC # BLD: 0 K/UL (ref 0–0.2)
PLATELET # BLD AUTO: 301 K/UL (ref 150–450)
PMV BLD AUTO: 11 FL (ref 9.4–12.3)
POTASSIUM SERPL-SCNC: 4.1 MMOL/L (ref 3.5–5.1)
PROT SERPL-MCNC: 5.3 G/DL (ref 6.3–8.2)
RBC # BLD AUTO: 3.53 M/UL (ref 4.05–5.2)
SODIUM SERPL-SCNC: 139 MMOL/L (ref 136–145)
WBC # BLD AUTO: 5.5 K/UL (ref 4.3–11.1)

## 2022-02-09 PROCEDURE — 74011250637 HC RX REV CODE- 250/637: Performed by: STUDENT IN AN ORGANIZED HEALTH CARE EDUCATION/TRAINING PROGRAM

## 2022-02-09 PROCEDURE — 85025 COMPLETE CBC W/AUTO DIFF WBC: CPT

## 2022-02-09 PROCEDURE — 77010033711 HC HIGH FLOW OXYGEN

## 2022-02-09 PROCEDURE — 65660000000 HC RM CCU STEPDOWN

## 2022-02-09 PROCEDURE — 80053 COMPREHEN METABOLIC PANEL: CPT

## 2022-02-09 PROCEDURE — 74011250637 HC RX REV CODE- 250/637: Performed by: FAMILY MEDICINE

## 2022-02-09 PROCEDURE — 94762 N-INVAS EAR/PLS OXIMTRY CONT: CPT

## 2022-02-09 PROCEDURE — 36415 COLL VENOUS BLD VENIPUNCTURE: CPT

## 2022-02-09 PROCEDURE — 74011000250 HC RX REV CODE- 250: Performed by: FAMILY MEDICINE

## 2022-02-09 PROCEDURE — 74011000250 HC RX REV CODE- 250: Performed by: STUDENT IN AN ORGANIZED HEALTH CARE EDUCATION/TRAINING PROGRAM

## 2022-02-09 PROCEDURE — 97530 THERAPEUTIC ACTIVITIES: CPT

## 2022-02-09 RX ORDER — METOPROLOL TARTRATE 25 MG/1
12.5 TABLET, FILM COATED ORAL EVERY 12 HOURS
Status: DISCONTINUED | OUTPATIENT
Start: 2022-02-09 | End: 2022-02-12

## 2022-02-09 RX ORDER — TEMAZEPAM 15 MG/1
15 CAPSULE ORAL
Status: DISCONTINUED | OUTPATIENT
Start: 2022-02-09 | End: 2022-02-09

## 2022-02-09 RX ORDER — TEMAZEPAM 15 MG/1
30 CAPSULE ORAL
Status: DISCONTINUED | OUTPATIENT
Start: 2022-02-09 | End: 2022-02-09

## 2022-02-09 RX ORDER — TEMAZEPAM 15 MG/1
30 CAPSULE ORAL
Status: DISCONTINUED | OUTPATIENT
Start: 2022-02-09 | End: 2022-02-16 | Stop reason: HOSPADM

## 2022-02-09 RX ADMIN — TEMAZEPAM 30 MG: 15 CAPSULE ORAL at 21:09

## 2022-02-09 RX ADMIN — APIXABAN 5 MG: 5 TABLET, FILM COATED ORAL at 21:09

## 2022-02-09 RX ADMIN — SODIUM CHLORIDE, PRESERVATIVE FREE 10 ML: 5 INJECTION INTRAVENOUS at 06:11

## 2022-02-09 RX ADMIN — MIDODRINE HYDROCHLORIDE 5 MG: 5 TABLET ORAL at 12:59

## 2022-02-09 RX ADMIN — SODIUM CHLORIDE, PRESERVATIVE FREE 10 ML: 5 INJECTION INTRAVENOUS at 13:15

## 2022-02-09 RX ADMIN — Medication 5 MG: at 21:09

## 2022-02-09 RX ADMIN — ESCITALOPRAM OXALATE 5 MG: 10 TABLET ORAL at 17:32

## 2022-02-09 RX ADMIN — MIDODRINE HYDROCHLORIDE 5 MG: 5 TABLET ORAL at 17:32

## 2022-02-09 RX ADMIN — MIDODRINE HYDROCHLORIDE 5 MG: 5 TABLET ORAL at 09:35

## 2022-02-09 RX ADMIN — DEXLANSOPRAZOLE 60 MG: 60 CAPSULE, DELAYED RELEASE ORAL at 09:35

## 2022-02-09 RX ADMIN — ATORVASTATIN CALCIUM 40 MG: 40 TABLET, FILM COATED ORAL at 21:09

## 2022-02-09 RX ADMIN — GABAPENTIN 800 MG: 400 CAPSULE ORAL at 17:32

## 2022-02-09 RX ADMIN — GABAPENTIN 800 MG: 400 CAPSULE ORAL at 09:35

## 2022-02-09 RX ADMIN — SODIUM CHLORIDE, PRESERVATIVE FREE 10 ML: 5 INJECTION INTRAVENOUS at 06:10

## 2022-02-09 RX ADMIN — VITAMIN D, TAB 1000IU (100/BT) 2000 UNITS: 25 TAB at 09:35

## 2022-02-09 RX ADMIN — APIXABAN 5 MG: 5 TABLET, FILM COATED ORAL at 09:36

## 2022-02-09 RX ADMIN — SODIUM CHLORIDE, PRESERVATIVE FREE 10 ML: 5 INJECTION INTRAVENOUS at 21:10

## 2022-02-09 NOTE — PROGRESS NOTES
Hospitalist Progress Note   Admit Date:  2022 12:32 PM   Name:  Alexis Holm   Age:  80 y.o. Sex:  female  :  1935   MRN:  [id number]   Room:  [facility]    Presenting Complaint: Positive For Covid-19    Reason(s) for Admission: Acute hypoxemic respiratory failure due to COVID-19 [facility]) [U07.1, J96.01]     Hospital Course & Interval History:   Radha Carlos a 80 y.o. female with medical history of HTN, mixed HLD, GERD, Glaucoma, Hay fever,  HLD, insomnia who presented from home via EMS with hypoxia and AMS with recent diagnosis of COVID-19. EMS reports patient was hypoxic on arrival, O2 saturation in the 60s.  Placed on 6 L and given terbutaline.  EMS also noted family some concern for patient being more altered than normal.     In ED, T102 BP 91/56    spo2 74% RA, 85% 6L NC, 91% 55L/m  LA 3.9 CRP 17.3 D dimer 3.85 NT pro BNP 3481 HS trop 1544>1609   CXR BL infiltrates   rec'd Tylenol 1000 mg and decadron 10 mg IV      EKG shows sinus tachycardia, rate 115, , QRS 92, QTc 491     She is alert and oriented to person, place  but not month or year. Unable to get in touch with either son or . She notes symptom onset 5 days ago. Patient denies CP or palpitations, nausea, vomiting, diarrhea, fevers. +chills, +cough. Says  is sick. Discussed Actemra with patient including risks and benefits.      satting 94% on 55/90% airvo.     Covid during the stay  Admitted for acute hypoxic respiratory failure secondary to Covid pneumonia, status post Actemra. Hospitalist following the patient started the patient on baricitinib on ? Oleg Palacio. Last dose of baricitinib on 2022. Finish the course of Decadron. Cardiology consulted for new A. fib with RVR, rate controlled, started on Eliquis, cardiology signed off. Mild elevated troponin secondary to demand ischemia from hypoxia secondary to Covid pneumonia.   Patient developed a UTI during the stay, culture positive for E. coli, finished a course of antibiotics with Rocephin. Patient presently on Airvo with FiO2 of 55% and flow of 50 L/min. Feeling of alone, bored, depressed, no suicidal ideation, started on Lexapro during the stay, on temazepam for sleep. Low blood pressure, high 80s 2/8/2022, started on midodrine. We will repeat EKG on 2/10/2022, with elevated QT prolongation at the time of admission. Subjective (02/09/22):  2/3/2022  Wants to go home,pt on fio2 80%  2/4/2022  Complaining of being anxious, decreased sleep manic depressed, no suicidal ideation, on Airvo with FiO2 of 85%, oxygen saturation ranging from 95 to 100%. 2/5/22  On Airvo FiO2 55%, says breathing better, did not sleep at bed intermittently last night, spoke to son on phone. Wants to go home. 2/6/2022  Wants to go home, complaining of back pain, says did not have good sleep, patient presently on Airvo with FiO2 of 55 this morning, weaned to 50 later on this afternoon, on flow at 35 L/min  Spoke to family on phone in presence of patient    2/7/2022  Wants to go home  Says breathing better  On Airvo with FiO2 of 66%    2/8/2022  FiO2 of 60%, flow 55 L/min    2/9/2022  Wants to go home  On Airvo at FiO2 of 55% and flow of 50 L/min  Mild low blood pressure episodes yesterday resolved on midodrine    Assessment & Plan:   Acute Hypoxemic Respiratory Failure 2/2 COVID-19 PNA   Patient is saturating well on a low 50 L/MIN  Suspected Cytokine Storm with elevated markers   - COVID + 1/16 ,  Unvaccinated   - Decadron 6 mg x 10 doses   -Continue Bacitricininb  2/9/2022-s/p Actemra, hospitalist following the patient started the patient also on baricitinib on 1/28/2022? Cytochrome Pia, last dose of baricitinib on 2/8/2022. Finish the course of antibiotics on 2/4/2022. Presently on Airvo with FiO2 of 55% and flow at 50 L/min    Episodes of hypotension yesterday, started on midodrine, stable blood pressure. Normal white blood count, no fever.     uti- e. coli- on rocephin  Finished course of Rocephin    Insomnia/anxiety/depression  No suicidal ideation  We will start on Lexapro, melatonin and Xanax at bedtime  On temazepam 15 mg as needed bedtime. 2/9/2022-discontinued Xanax, increase to her home dose of temazepam 30 mg nightly.     New onset Afib  EKG showed A. fib with RVR with HR 170s  Troponins were elevated and down trended  Lilian vasc score of 4 and stroke risk of 4.8% per yr  Continue  metoprolol  Continue Eliquis   echo with normal EF   Cardiology signed off  -    Sepsis 2/2 COVID -   Resolved      Troponemia  Cardiac enzymes downtrending. EKG does not demonstrate ST segment elevation. Cardiology consulted Hypercoagulable state associated with COVID-19 -      Hypokalemia // Hypomagnesemia - associated with prolonged QTc   Hypokalemia and hypomagnesemia resolved. Will order repeat EKG on 2/10/2022.     Acute Encephalopathy -  Resolved        Chronic insomnia - takes temazepam 30 mg nightly for this. At risk of withdrawal. Reduce dose to 15 mg as needed.      Lumbar spondylosis - f/b pain management. Takes gabapentin 800 mg BID, tramadol 100 mg BID. Reduce gabapentin 300 mg TID prn, hold tramadol for now.     2/6/2022-complaining of back pain, will increase gabapentin to her home dosage of 800 mg twice daily, added lidocaine patch        HLD - continue  atorvastatin      H/o HTN - Normotensive Monitor      GERD - resume protonix.           Diet:  ADULT DIET Easy to Chew; Low Fat/Low Chol/High Fiber/EVA  DVT PPx: Eliquis  Code status: Full Code                Hospital Problems as of 2/9/2022 Date Reviewed: 4/12/2018          Codes Class Noted - Resolved POA    UTI (urinary tract infection) ICD-10-CM: N39.0  ICD-9-CM: 599.0  2/3/2022 - Present Unknown        * (Principal) [facility] [place] [address]) ICD-10-CM: U07.1, J96.01  ICD-9-CM: 518.81, 079.89, 799.02  1/26/2022 - Present Yes        Sepsis [facility] [place] [address]) ICD-10-CM: U07.1, A41.89  ICD-9-CM: 079.89, 995.91  1/26/2022 - Present Yes        Hypomagnesemia ICD-10-CM: E83.42  ICD-9-CM: 275.2  1/26/2022 - Present Yes        Hypokalemia ICD-10-CM: E87.6  ICD-9-CM: 276.8  1/26/2022 - Present Yes        Prolonged Q-T interval on ECG ICD-10-CM: R94.31  ICD-9-CM: 794.31  1/26/2022 - Present Yes        Elevated troponin ICD-10-CM: R77.8  ICD-9-CM: 790.6  1/26/2022 - Present Yes        Toxic metabolic encephalopathy FDC-53-CT: G92.8  ICD-9-CM: 349.82  1/26/2022 - Present Yes        Chronic prescription benzodiazepine use ICD-10-CM: Z79.899  ICD-9-CM: V58.69  1/26/2022 - Present Yes        Hypercoagulable state associated with COVID-19 [facility]) ICD-10-CM: U07.1, D68.69  ICD-9-CM: 289.82, 079.89  1/26/2022 - Present Yes        Transaminitis ICD-10-CM: R74.01  ICD-9-CM: 790.4  1/26/2022 - Present Yes        Gastroesophageal reflux disease without esophagitis ICD-10-CM: K21.9  ICD-9-CM: 530.81  9/28/2017 - Present Yes    Overview Signed 1/9/2018 10:12 AM by Valaria Severe, DO     Last Assessment & Plan:   Stable. Essential (primary) hypertension ICD-10-CM: I10  ICD-9-CM: 401.9  10/28/2016 - Present Yes    Overview Signed 1/9/2018 10:12 AM by Valaria Severe, DO     Last Assessment & Plan:   Stable. Hyperlipidemia ICD-10-CM: E78.5  ICD-9-CM: 272.4  10/28/2016 - Present Yes    Overview Signed 1/9/2018 10:12 AM by Valaria Severe, DO     Last Assessment & Plan:   Return for fasting labs. Insomnia, persistent ICD-10-CM: G47.00  ICD-9-CM: 307.42  4/27/2016 - Present Yes    Overview Signed 1/9/2018 10:12 AM by Valaria Severe, DO     Last Assessment & Plan:   Restoril prescription printed & given.              Primary osteoarthritis involving multiple joints ICD-10-CM: M89.49  ICD-9-CM: 715.98  7/31/2015 - Present Yes    Overview Signed 1/9/2018 10:12 AM by Valaria Severe, DO     Last Assessment & Plan:   3 Norco scripts printed with do not fill before dates of 10/10, 11/9, 12/9. Tramadol prescription printed & given (decreased from 150 to 120 tablets per month based on how patient is taking it). Aware of risk of sedation and aware to get prescriptions only in our office. Discussed polypharmacy & fall risk with patient's multiple sedating medications, encouraged to limit use as much as possible. Will have her see Dr. Ehsan Reilly for next follow up to review pain medication regimen. Pt verbalized understanding, agreed with plan. Objective:     Patient Vitals for the past 24 hrs:   Temp Pulse Resp BP SpO2   02/09/22 1604 97.6 °F (36.4 °C) (!) 55 21 108/62 98 %   02/09/22 1207 97.7 °F (36.5 °C) (!) 58 20 104/60 100 %   02/09/22 0853 -- -- -- -- 98 %   02/09/22 0748 98.1 °F (36.7 °C) (!) 56 20 (!) 95/53 99 %   02/09/22 0327 97.8 °F (36.6 °C) (!) 58 21 (!) 86/69 97 %   02/09/22 0125 -- (!) 57 -- -- --   02/08/22 2318 97.8 °F (36.6 °C) (!) 54 21 (!) 88/62 99 %   02/08/22 2237 -- (!) 58 -- (!) 100/58 --   02/08/22 2100 -- (!) 58 -- (!) 100/55 --   02/08/22 2017 -- -- -- -- 97 %   02/08/22 1929 98 °F (36.7 °C) (!) 56 21 (!) 85/59 99 %     Oxygen Therapy  O2 Sat (%): 98 % (02/09/22 1604)  Pulse via Oximetry: 56 beats per minute (02/09/22 0853)  O2 Device: Heated; Hi flow nasal cannula (02/09/22 0853)  Skin Assessment: Clean, dry, & intact (02/09/22 0740)  Skin Protection for O2 Device: No (02/09/22 0740)  O2 Flow Rate (L/min): 50 l/min (02/09/22 0853)  O2 Temperature: 87.8 °F (31 °C) (02/09/22 0740)  FIO2 (%): 55 % (02/09/22 0853)    Estimated body mass index is 25.92 kg/m² as calculated from the following:    Height as of this encounter: 5' 1\" (1.549 m). Weight as of this encounter: 62.2 kg (137 lb 3.2 oz).     Intake/Output Summary (Last 24 hours) at 2/9/2022 1752  Last data filed at 2/9/2022 1249  Gross per 24 hour   Intake 120 ml   Output --   Net 120 ml         Physical Exam:     Blood pressure 108/62, pulse (!) 55, temperature 97.6 °F (36.4 °C), resp. rate 21, height 5' 1\" (1.549 m), weight 62.2 kg (137 lb 3.2 oz), SpO2 98 %. General:    Well nourished. Mild resp distress on 55% % fio2,airvo  Head:  Normocephalic, atraumatic  Eyes:  Sclerae appear normal.  Pupils equally round. ENT:  Nares appear normal, no drainage. Moist oral mucosa  Neck:  No restricted ROM. Trachea midline   CV:   RRR. No m/r/g. No jugular venous distension. Lungs:   Bilateral coarse breath sounds  Abdomen: Bowel sounds present. Soft, nontender, nondistended. Extremities: No cyanosis or clubbing. No edema  Skin:     No rashes and normal coloration. Warm and dry. Neuro:  CN II-XII grossly intact. Sensation intact. A&Ox3  Psych:  Normal mood and affect. I have reviewed ordered lab tests and independently visualized imaging below:    Recent Labs:  Recent Results (from the past 48 hour(s))   METABOLIC PANEL, COMPREHENSIVE    Collection Time: 02/08/22  7:33 AM   Result Value Ref Range    Sodium 140 136 - 145 mmol/L    Potassium 4.3 3.5 - 5.1 mmol/L    Chloride 101 98 - 107 mmol/L    CO2 34 (H) 21 - 32 mmol/L    Anion gap 5 (L) 7 - 16 mmol/L    Glucose 95 65 - 100 mg/dL    BUN 28 (H) 8 - 23 MG/DL    Creatinine 0.70 0.6 - 1.0 MG/DL    GFR est AA >60 >60 ml/min/1.73m2    GFR est non-AA >60 >60 ml/min/1.73m2    Calcium 8.7 8.3 - 10.4 MG/DL    Bilirubin, total 0.4 0.2 - 1.1 MG/DL    ALT (SGPT) 24 12 - 65 U/L    AST (SGOT) 27 15 - 37 U/L    Alk.  phosphatase 66 50 - 136 U/L    Protein, total 5.4 (L) 6.3 - 8.2 g/dL    Albumin 2.6 (L) 3.2 - 4.6 g/dL    Globulin 2.8 2.3 - 3.5 g/dL    A-G Ratio 0.9 (L) 1.2 - 3.5     METABOLIC PANEL, COMPREHENSIVE    Collection Time: 02/09/22  6:16 AM   Result Value Ref Range    Sodium 139 136 - 145 mmol/L    Potassium 4.1 3.5 - 5.1 mmol/L    Chloride 101 98 - 107 mmol/L    CO2 37 (H) 21 - 32 mmol/L    Anion gap 1 (L) 7 - 16 mmol/L    Glucose 122 (H) 65 - 100 mg/dL    BUN 32 (H) 8 - 23 MG/DL    Creatinine 0.80 0.6 - 1.0 MG/DL    GFR est AA >60 >60 ml/min/1.73m2    GFR est non-AA >60 >60 ml/min/1.73m2    Calcium 8.6 8.3 - 10.4 MG/DL    Bilirubin, total 0.3 0.2 - 1.1 MG/DL    ALT (SGPT) 28 12 - 65 U/L    AST (SGOT) 28 15 - 37 U/L    Alk. phosphatase 84 50 - 136 U/L    Protein, total 5.3 (L) 6.3 - 8.2 g/dL    Albumin 2.5 (L) 3.2 - 4.6 g/dL    Globulin 2.8 2.3 - 3.5 g/dL    A-G Ratio 0.9 (L) 1.2 - 3.5     CBC WITH AUTOMATED DIFF    Collection Time: 02/09/22  6:16 AM   Result Value Ref Range    WBC 5.5 4.3 - 11.1 K/uL    RBC 3.53 (L) 4.05 - 5.2 M/uL    HGB 10.3 (L) 11.7 - 15.4 g/dL    HCT 32.5 (L) 35.8 - 46.3 %    MCV 92.1 79.6 - 97.8 FL    MCH 29.2 26.1 - 32.9 PG    MCHC 31.7 31.4 - 35.0 g/dL    RDW 13.7 11.9 - 14.6 %    PLATELET 157 593 - 645 K/uL    MPV 11.0 9.4 - 12.3 FL    ABSOLUTE NRBC 0.00 0.0 - 0.2 K/uL    DF AUTOMATED      NEUTROPHILS 53 43 - 78 %    LYMPHOCYTES 31 13 - 44 %    MONOCYTES 12 4.0 - 12.0 %    EOSINOPHILS 3 0.5 - 7.8 %    BASOPHILS 0 0.0 - 2.0 %    IMMATURE GRANULOCYTES 1 0.0 - 5.0 %    ABS. NEUTROPHILS 2.9 1.7 - 8.2 K/UL    ABS. LYMPHOCYTES 1.7 0.5 - 4.6 K/UL    ABS. MONOCYTES 0.7 0.1 - 1.3 K/UL    ABS. EOSINOPHILS 0.2 0.0 - 0.8 K/UL    ABS. BASOPHILS 0.0 0.0 - 0.2 K/UL    ABS. IMM. GRANS. 0.0 0.0 - 0.5 K/UL       All Micro Results     Procedure Component Value Units Date/Time    CULTURE, URINE [[id number]]  (Abnormal)  (Susceptibility) Collected: 01/28/22 1105    Order Status: Completed Specimen: Urine from Clean catch Updated: 01/30/22 0890     Special Requests: NO SPECIAL REQUESTS        Culture result:       >100,000 COLONIES/mL ESCHERICHIA COLI                  <1,000 CFU/ML MIXED SKIN SCOTT ISOLATED          COVID-19 RAPID TEST [[id number]]  (Abnormal) Collected: 01/26/22 1258    Order Status: Completed Specimen: Nasopharyngeal Updated: 01/26/22 1316     Specimen source NASAL        COVID-19 rapid test Detected        Comment:      The specimen is POSITIVE for SARS-CoV-2, the novel coronavirus associated with COVID-19.        This test has been authorized by the FDA under an Emergency Use Authorization (EUA) for use by authorized laboratories. Fact sheet for Healthcare Providers: ConventionUpdate.co.nz  Fact sheet for Patients: ConventionUpdate.co.nz       Methodology: Isothermal Nucleic Acid Amplification               Other Studies:  No results found.     Current Meds:  Current Facility-Administered Medications   Medication Dose Route Frequency    metoprolol tartrate (LOPRESSOR) tablet 12.5 mg  12.5 mg Oral Q12H    midodrine (PROAMATINE) tablet 5 mg  5 mg Oral TID WITH MEALS    lidocaine 4 % patch 1 Patch  1 Patch TransDERmal Q24H    gabapentin (NEURONTIN) capsule 800 mg  800 mg Oral BID    ALPRAZolam (XANAX) tablet 0.25 mg  0.25 mg Oral QHS    escitalopram oxalate (LEXAPRO) tablet 5 mg  5 mg Oral QPM    melatonin tablet 5 mg  5 mg Oral QHS    lip protectant (BLISTEX) ointment 1 Each  1 Each Topical PRN    apixaban (ELIQUIS) tablet 5 mg  5 mg Oral BID    traMADoL (ULTRAM) tablet 100 mg  100 mg Oral Q8H PRN    dexlansoprazole (DEXILANT) capsule (delayed release) CpDB 60 mg (Patient Supplied)  60 mg Oral DAILY    sodium chloride (NS) flush 5-10 mL  5-10 mL IntraVENous Q8H    sodium chloride (NS) flush 5-10 mL  5-10 mL IntraVENous PRN    sodium chloride (NS) flush 5-40 mL  5-40 mL IntraVENous Q8H    sodium chloride (NS) flush 5-40 mL  5-40 mL IntraVENous PRN    acetaminophen (TYLENOL) tablet 650 mg  650 mg Oral Q6H PRN    Or    acetaminophen (TYLENOL) suppository 650 mg  650 mg Rectal Q6H PRN    polyethylene glycol (MIRALAX) packet 17 g  17 g Oral DAILY PRN    ondansetron (ZOFRAN ODT) tablet 4 mg  4 mg Oral Q8H PRN    Or    ondansetron (ZOFRAN) injection 4 mg  4 mg IntraVENous Q6H PRN    guaiFENesin-dextromethorphan (ROBITUSSIN DM) 100-10 mg/5 mL syrup 5 mL  5 mL Oral Q4H PRN    cholecalciferol (VITAMIN D3) (1000 Units /25 mcg) tablet 2,000 Units  2,000 Units Oral DAILY    alum-mag hydroxide-simeth (MYLANTA) oral suspension 15 mL  15 mL Oral Q6H PRN    albuterol (PROVENTIL HFA, VENTOLIN HFA, PROAIR HFA) inhaler 2 Puff  2 Puff Inhalation Q4H PRN    atorvastatin (LIPITOR) tablet 40 mg  40 mg Oral QHS    temazepam (RESTORIL) capsule 15 mg  15 mg Oral QHS PRN       Signed:  Alonzo Sanchez MD

## 2022-02-09 NOTE — PROGRESS NOTES
ACUTE PHYSICAL THERAPY GOALS:  (Developed with and agreed upon by patient and/or caregiver. )    LTG:  (1.)Ms. Fitzgerald will move from supine to sit and sit to supine , scoot up and down and roll side to side in flat bed without siderails with  INDEPENDENT within 7 day(s). (2.)Ms. Fitzgerald will perform all functional transfers with  MODIFIED INDEPENDENCE using the least restrictive/no device within 7 day(s). (3.)Ms. Fitzgerald will ambulate with  independently with rolling walker  for 100+ feet with normal vital sign response  within 7 day(s). PHYSICAL THERAPY: Daily Note, Re-evaluation and AM Treatment Day # 1    Erum Johnson is a 80 y.o. female   PRIMARY DIAGNOSIS: Acute hypoxemic respiratory failure due to COVID-19 [facility])  [facility] hypox[facility] [U07.1, J96.01]         ASSESSMENT:     REHAB RECOMMENDATIONS: CURRENT LEVEL OF FUNCTION:  (Most Recently Demonstrated)   Recommendation to date pending progress:  [address] [place] [address] Therapy  Equipment:    To Be Determined Bed Mobility:   Supervision  Sit to Stand:   Supervision  Transfers:   Supervision  Gait/Mobility:   Supervision     ASSESSMENT:  Ms. Pilo Silva has been seen by PT for mobility and gait training. She has had a hard time with being closed up in the room by herself but today she admits she is doing better. She was willing to participate and had a good session. She is still on airvo but her O2 sats were remaining above 90 for majority of the session. It dropped to mid 80s a few times after moving but with rest and breath bounced right back up over 90. She is really progressing towards goals. Now she goes from bed to bsc to chair to bed on her own. She is just limited by airvo.   Ms. Pilo Silva remains appropriate for skilled PT to maximize her rehab potential.  Plan is still for home at time of discharge with HHPT     SUBJECTIVE:   Ms. Pilo Silva states \"IM doing better I am just really having a hard time being in this room with the door closed by myself. SOCIAL HISTORY/ LIVING ENVIRONMENT: Ms. Juliana Sherman lives with her , son, and DIL. She ambulates independently and is primarily homebound. Support Systems: Spouse/Significant Other,Child(sky)  OBJECTIVE:     PAIN: VITAL SIGNS: LINES/DRAINS:   Pre Treatment: Pain Screen  Pain Scale 1: Numeric (0 - 10)  Pain Intensity 1: 00  Post Treatment: 0  Low 90's high 80's throughout Continuous Pulse Oximetry  O2 Device: Heated,Hi flow nasal cannula       MOBILITY: I Mod I S SBA CGA Min Mod Max Total  NT x2 Comments:   Bed Mobility    Rolling [] [] [] [] [] [] [] [] [] [x] []    Supine to Sit [] [] [x] [] [] [] [] [] [] [] []    Scooting [] [] [x] [] [] [] [] [] [] [] []    Sit to Supine [] [] [x] [] [] [] [] [] [] [] []    Transfers    Sit to Stand [] [] [x] [] [] [] [] [] [] [] []    Bed to Chair [] [] [x] [] [] [] [] [] [] [] []    Stand to Sit [] [] [x] [] [] [] [] [] [] [] []    I=Independent, Mod I=Modified Independent, S=Supervision, SBA=Standby Assistance, CGA=Contact Guard Assistance,   Min=Minimal Assistance, Mod=Moderate Assistance, Max=Maximal Assistance, Total=Total Assistance, NT=Not Tested    BALANCE: Good Fair+ Fair Fair- Poor NT Comments   Sitting Static [] [x] [] [] [] []    Sitting Dynamic [] [x] [] [] [] []              Standing Static [] [x] [] [] [] []    Standing Dynamic [] [x] [] [] [] []      GAIT: I Mod I S SBA CGA Min Mod Max Total  NT x2 Comments:   Level of Assistance [] [] [x] [] [] [] [] [] [] [] [] 3 x 25 marching steps with seated rest break. Distance     DME hand held assist.  short distance from bed to chair. like 4 ft.       Gait Quality     Weightbearing  Status N/A     I=Independent, Mod I=Modified Independent, S=Supervision, SBA=Standby Assistance, CGA=Contact Guard Assistance,   Min=Minimal Assistance, Mod=Moderate Assistance, Max=Maximal Assistance, Total=Total Assistance, NT=Not Tested    PLAN:   FREQUENCY/DURATION: PT Plan of Care: 3 times/week for duration of hospital stay or until stated goals are met, whichever comes first.  TREATMENT:     TREATMENT:   ($$ Therapeutic Activity: 23-37 mins    )  Therapeutic Activity (27 Minutes): Therapeutic activity included Supine to Sit, Scooting, Ambulation on level ground, Sitting balance , Standing balance and reviewed LE therex, did incentive spirometer  to improve functional Mobility, Strength and Activity tolerance.     TREATMENT GRID:  N/A    AFTER TREATMENT POSITION/PRECAUTIONS:  Bed, Needs within reach and RN notified    INTERDISCIPLINARY COLLABORATION:  RN/PCT    TOTAL TREATMENT DURATION:  PT Patient Time In/Time Out  Time In: 1100  Time Out: [address], PT

## 2022-02-09 NOTE — PROGRESS NOTES
Pt is resting in bed at this time. Pt is alert and oriented times 4 but hard of hearing. Pt is on Airvo 50L/60%. Pt has no s/sx of acute distress at this time. Pt has no requests at this time. Pt has safety measures in place. Pt has call light within reach and is encouraged to call for assistance if needed. Will continue to monitor.

## 2022-02-10 ENCOUNTER — APPOINTMENT (OUTPATIENT)
Dept: GENERAL RADIOLOGY | Age: 87
DRG: 871 | End: 2022-02-10
Attending: FAMILY MEDICINE
Payer: MEDICARE

## 2022-02-10 LAB
ANION GAP SERPL CALC-SCNC: 5 MMOL/L (ref 7–16)
BUN SERPL-MCNC: 33 MG/DL (ref 8–23)
CALCIUM SERPL-MCNC: 8.6 MG/DL (ref 8.3–10.4)
CHLORIDE SERPL-SCNC: 103 MMOL/L (ref 98–107)
CO2 SERPL-SCNC: 33 MMOL/L (ref 21–32)
CREAT SERPL-MCNC: 0.7 MG/DL (ref 0.6–1)
CRP SERPL-MCNC: <0.3 MG/DL (ref 0–0.9)
GLUCOSE SERPL-MCNC: 120 MG/DL (ref 65–100)
POTASSIUM SERPL-SCNC: 4.1 MMOL/L (ref 3.5–5.1)
SODIUM SERPL-SCNC: 141 MMOL/L (ref 136–145)

## 2022-02-10 PROCEDURE — 86140 C-REACTIVE PROTEIN: CPT

## 2022-02-10 PROCEDURE — 93005 ELECTROCARDIOGRAM TRACING: CPT | Performed by: FAMILY MEDICINE

## 2022-02-10 PROCEDURE — 74011250637 HC RX REV CODE- 250/637: Performed by: FAMILY MEDICINE

## 2022-02-10 PROCEDURE — 65660000000 HC RM CCU STEPDOWN

## 2022-02-10 PROCEDURE — 36415 COLL VENOUS BLD VENIPUNCTURE: CPT

## 2022-02-10 PROCEDURE — 74011000250 HC RX REV CODE- 250: Performed by: FAMILY MEDICINE

## 2022-02-10 PROCEDURE — 97530 THERAPEUTIC ACTIVITIES: CPT

## 2022-02-10 PROCEDURE — 94760 N-INVAS EAR/PLS OXIMETRY 1: CPT

## 2022-02-10 PROCEDURE — 74011000250 HC RX REV CODE- 250: Performed by: STUDENT IN AN ORGANIZED HEALTH CARE EDUCATION/TRAINING PROGRAM

## 2022-02-10 PROCEDURE — 77010033711 HC HIGH FLOW OXYGEN

## 2022-02-10 PROCEDURE — 74011250637 HC RX REV CODE- 250/637: Performed by: STUDENT IN AN ORGANIZED HEALTH CARE EDUCATION/TRAINING PROGRAM

## 2022-02-10 PROCEDURE — 83735 ASSAY OF MAGNESIUM: CPT

## 2022-02-10 PROCEDURE — 80048 BASIC METABOLIC PNL TOTAL CA: CPT

## 2022-02-10 PROCEDURE — 97535 SELF CARE MNGMENT TRAINING: CPT

## 2022-02-10 PROCEDURE — 71045 X-RAY EXAM CHEST 1 VIEW: CPT

## 2022-02-10 RX ADMIN — MIDODRINE HYDROCHLORIDE 5 MG: 5 TABLET ORAL at 16:33

## 2022-02-10 RX ADMIN — GABAPENTIN 800 MG: 400 CAPSULE ORAL at 09:00

## 2022-02-10 RX ADMIN — SODIUM CHLORIDE, PRESERVATIVE FREE 5 ML: 5 INJECTION INTRAVENOUS at 06:18

## 2022-02-10 RX ADMIN — MIDODRINE HYDROCHLORIDE 5 MG: 5 TABLET ORAL at 08:09

## 2022-02-10 RX ADMIN — VITAMIN D, TAB 1000IU (100/BT) 2000 UNITS: 25 TAB at 08:08

## 2022-02-10 RX ADMIN — DEXLANSOPRAZOLE 60 MG: 60 CAPSULE, DELAYED RELEASE ORAL at 09:00

## 2022-02-10 RX ADMIN — ESCITALOPRAM OXALATE 5 MG: 10 TABLET ORAL at 17:18

## 2022-02-10 RX ADMIN — SODIUM CHLORIDE, PRESERVATIVE FREE 5 ML: 5 INJECTION INTRAVENOUS at 14:00

## 2022-02-10 RX ADMIN — MIDODRINE HYDROCHLORIDE 5 MG: 5 TABLET ORAL at 11:53

## 2022-02-10 RX ADMIN — APIXABAN 5 MG: 5 TABLET, FILM COATED ORAL at 22:01

## 2022-02-10 RX ADMIN — TRAMADOL HYDROCHLORIDE 100 MG: 50 TABLET, COATED ORAL at 16:33

## 2022-02-10 RX ADMIN — APIXABAN 5 MG: 5 TABLET, FILM COATED ORAL at 08:09

## 2022-02-10 RX ADMIN — ATORVASTATIN CALCIUM 40 MG: 40 TABLET, FILM COATED ORAL at 22:01

## 2022-02-10 RX ADMIN — SODIUM CHLORIDE, PRESERVATIVE FREE 5 ML: 5 INJECTION INTRAVENOUS at 22:01

## 2022-02-10 RX ADMIN — TEMAZEPAM 30 MG: 15 CAPSULE ORAL at 22:01

## 2022-02-10 RX ADMIN — Medication 5 MG: at 22:01

## 2022-02-10 RX ADMIN — GABAPENTIN 800 MG: 400 CAPSULE ORAL at 17:18

## 2022-02-10 RX ADMIN — GUAIFENESIN SYRUP AND DEXTROMETHORPHAN 5 ML: 100; 10 SYRUP ORAL at 08:08

## 2022-02-10 NOTE — PROGRESS NOTES
ACUTE OT GOALS:  (Developed with and agreed upon by patient and/or caregiver.)  1) Patient will complete upper body bathing and dressing with SET UP and adaptive equipment as needed. 2) Patient will complete lower body bathing and dressing with SBA and adaptive equipment as needed. 2) Patient will complete toileting with SBA. 3) Patient will complete functional transfers with SUPERVISION and adaptive equipment as needed. 4) Patient will tolerate at least 25 minutes of OT activity with 1-2 rest breaks while maintaining O2 sats >90%. 5) Patient will verbalize at least 3 energy conservation technique to utilize during ADL/IADL. Timeframe: 7 visits       OCCUPATIONAL THERAPY: Daily Note OT Treatment Day # 3    Sebastian Mora is a 80 y.o. female   PRIMARY DIAGNOSIS: Acute hypoxemic respiratory failure due to COVID-19 [facility])  Acute hypoxemic respiratory failure [facility] COVID-19 [facility] [U07.1, J96.01]       Payor: [employer] MEDICARE / Plan: [address] / Product Type: [employer] Medicare /   ASSESSMENT:     REHAB RECOMMENDATIONS: CURRENT LEVEL OF FUNCTION:  (Most Recently Demonstrated)   Recommendation to date pending progress:  [address] [place] [address] Therapy  Equipment:    To Be Determined Bathing:   Not tested  Dressing:   Not tested  Feeding/Grooming:   Moderate Assistance to wash hair with shampoo cap  Toileting:   Not tested  Functional Mobility:   SBA/CGA     ASSESSMENT:  Ms. Jerald Truong presents in supine today and completed supine to sit transfer with SBA. Pt very willing to participate today. She did well with mobility, self care, and exercises. She only required SBA with mobility but was limited somewhat d/t lines. Pt's continuous pulse ox was reading pt's O2 dropping into the 70's. Therapist brought in a HH pulse ox and pt remained between 94-96% with activity. RN notified and changed out pt's finger probe.  She did not want to sit up in the chair because it is uncomfortable. Pt encouraged to sit up edge of bed often throughout the day. Good progress. Continue POC.      SUBJECTIVE:   Ms. Juliana Sherman states, \" I hope you will come back tomorrow\"    SOCIAL HISTORY/LIVING ENVIRONMENT:   Support Systems: Spouse/Significant Other,Child(sky)    OBJECTIVE:     PAIN: VITAL SIGNS: LINES/DRAINS:   Pre Treatment: Pain Screen  Pain Scale 1: Numeric (0 - 10)  Pain Intensity 1: 0  Post Treatment: 0   Continuous Pulse Oximetry  O2 Device: Heated,Hi flow nasal cannula     ACTIVITIES OF DAILY LIVING: I Mod I S SBA CGA Min Mod Max Total NT Comments   BASIC ADLs:              Bathing/ Showering [] [] [] [] [] [] [] [] [] [x]    Toileting [] [] [] [x] [] [] [] [] [] []    Dressing [] [] [] [] [] [] [] [] [] [x]    Feeding [] [] [] [] [] [] [] [] [] [x]    Grooming [] [] [] [] [] [] [] [] [] [x]    Personal Device Care [] [] [] [] [] [] [] [] [] [x]    Functional Mobility [] [] [] [] [x] [x] [] [] [] []    I=Independent, Mod I=Modified Independent, S=Supervision, SBA=Standby Assistance, CGA=Contact Guard Assistance,   Min=Minimal Assistance, Mod=Moderate Assistance, Max=Maximal Assistance, Total=Total Assistance, NT=Not Tested    MOBILITY: I Mod I S SBA CGA Min Mod Max Total  NT x2 Comments:   Supine to sit [] [] [] [x] [] [] [] [] [] [] []    Sit to supine [] [] [] [x] [] [] [] [] [] [] []    Sit to stand [] [] [] [x] [x] [] [] [] [] [] []    Bed to chair [] [] [] [] [] [] [] [] [] [x] []    I=Independent, Mod I=Modified Independent, S=Supervision, SBA=Standby Assistance, CGA=Contact Guard Assistance,   Min=Minimal Assistance, Mod=Moderate Assistance, Max=Maximal Assistance, Total=Total Assistance, NT=Not Tested    BALANCE: Good Fair+ Fair Fair- Poor NT Comments   Sitting Static [] [x] [] [] [] []    Sitting Dynamic [] [x] [] [] [] []              Standing Static [] [] [x] [] [] []    Standing Dynamic [] [] [x] [] [] []      PLAN:   FREQUENCY/DURATION: OT Plan of Care: 3 times/week for duration of hospital stay or until stated goals are met, whichever comes first.    TREATMENT:   TREATMENT:   ($$ Self Care/Home Management: 8-22 mins$$ Therapeutic Activity: 23-37 mins   )  Therapeutic Activity (30 Minutes): Therapeutic activity included Supine to Sit, Sit to Supine, Transfer Training, Ambulation on level ground, Sitting balance , Standing balance and exercises to improve functional Mobility, Strength, ROM and Activity tolerance. Self Care (8 Minutes): Self care including Toileting, Grooming and Energy Conservation Training to increase independence and decrease level of assistance required.     TREATMENT GRID:  N/A    AFTER TREATMENT POSITION/PRECAUTIONS:  Bed, Needs within reach and RN notified    INTERDISCIPLINARY COLLABORATION:  RN/PCT and OT/ROMERO    TOTAL TREATMENT DURATION:  OT Patient Time In/Time Out  Time In: 0763  Time Out: Megan Han 28 Fatou Posada

## 2022-02-10 NOTE — PROGRESS NOTES
Pt is resting in bed at this time. Pt is on Airvo 50L/55%. Pt has no s/sx of acute distress at this time. Pt has no requests at this time. Pt is alert and oriented times 4. Pt has safety measures in place. Pt has call light within reach and is encouraged to call for assistance if needed. Will continue to monitor.

## 2022-02-10 NOTE — PROGRESS NOTES
Hospitalist Progress Note   Admit Date:  2022 12:32 PM   Name:  Anai Bazzi   Age:  80 y.o. Sex:  female  :  1935   MRN:  [id number]   Room:  [facility]/    Presenting Complaint: Positive For Covid-19    Reason(s) for Admission: Acute hypoxemic respiratory failure due to COVID-19 [facility]) [U07.1, J96.01]     Hospital Course & Interval History:     Fei Gauthier a 80 y.o. female with medical history of HTN, mixed HLD, GERD, Glaucoma, Hay fever,  HLD, insomnia who presented from home via EMS with hypoxia and AMS with recent diagnosis of COVID-19. EMS reports patient was hypoxic on arrival, O2 saturation in the 60s.  Placed on 6 L and given terbutaline.  EMS also noted family some concern for patient being more altered than normal.     In ED, T102 BP 91/56    spo2 74% RA, 85% 6L NC, 91% 55L/m  LA 3.9 CRP 17.3 D dimer 3.85 NT pro BNP 3481 HS trop 1544>1609   CXR BL infiltrates   rec'd Tylenol 1000 mg and decadron 10 mg IV    EKG shows sinus tachycardia, rate 115, , QRS 92, QTc 491  S/p actemra on     Seen by cardiology for elevated troponins and new onset afib. Improved with BB and DOAC. Developed E coli UTI s/p course rocephin     Subjective/24hr Events (02/10/22): No acute change. Feels better. Sleeps well. Feels depressed and bored. Says shes usually very socal. Eager for DC     ROS:  Neg CP, SI, fever, chills   Assessment & Plan:   Acute Hypoxemic Respiratory Failure 2/2 COVID-19 PNA   - COVID +   - Unvaccinated   - Decadron 6 mg x 10 doses   - s/p Actemra on admission then was started on Baricitinib x 8 doses, last dose /  - Charted O2 Flow Rate (L/min): 50 l/min. Wean as able  - Awake proning as tolerated, anti-tussive PRN, Bronchodilators   Repeat XR     Hypotension - associated with COVID-19. Suspected dysautonomica vs corticosteroid withdrawal. BB was reduced and now held due to lesly and hypotension. Bradycardia - holding BB. See above. Check mag.      QT prolongation - repeat EKG ordered. Check mag     S/p AFIB RVR // Demand ischemia - stable on BB and DOAC. Evaluated by cardiology. 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  Increases risk of dementia, pna and falls     Chronic pain - cont gabapentin       Dispo/Discharge Planning:      Not stable     Diet:  ADULT DIET Easy to Chew; Low Fat/Low Chol/High Fiber/EVA  ADULT ORAL NUTRITION SUPPLEMENT Breakfast, Dinner, Lunch; Standard High Calorie/High Protein  DVT PPx: doac  Code status: Full Code    Hospital Problems as of 2/10/2022 Date Reviewed: 4/12/2018          Codes Class Noted - Resolved POA    * (Principal) Acute hypoxemic respiratory failure due to [address]) ICD-10-CM: U07.1, J96.01  ICD-9-CM: 518.81, 079.89, 799.02  1/26/2022 - Present Yes        Sepsis due to [address]) ICD-10-CM: U07.1, A41.89  ICD-9-CM: 079.89, 995.91  1/26/2022 - Present Yes        Hypotension ICD-10-CM: I95.9  ICD-9-CM: 458.9  2/9/2022 - Present Unknown        UTI (urinary tract infection) ICD-10-CM: N39.0  ICD-9-CM: 599.0  2/3/2022 - Present Unknown        Hypomagnesemia ICD-10-CM: E83.42  ICD-9-CM: 275.2  1/26/2022 - Present Yes        Hypokalemia ICD-10-CM: E87.6  ICD-9-CM: 276.8  1/26/2022 - Present Yes        Prolonged Q-T interval on ECG ICD-10-CM: R94.31  ICD-9-CM: 794.31  1/26/2022 - Present Yes        Elevated troponin ICD-10-CM: R77.8  ICD-9-CM: 790.6  1/26/2022 - Present Yes        Toxic metabolic encephalopathy FUB-59-NK: G92.8  ICD-9-CM: 349.82  1/26/2022 - Present Yes        Chronic prescription benzodiazepine use ICD-10-CM: Z79.899  ICD-9-CM: V58.69  1/26/2022 - Present Yes        Hypercoagulable state associated with COVID-19 [facility]) ICD-10-CM: U07.1, D68.69  ICD-9-CM: 289.82, 079.89  1/26/2022 - Present Yes        Transaminitis ICD-10-CM: R74.01  ICD-9-CM: 790.4  1/26/2022 - Present Yes Gastroesophageal reflux disease without esophagitis ICD-10-CM: K21.9  ICD-9-CM: 530.81  9/28/2017 - Present Yes    Overview Signed 1/9/2018 10:12 AM by Mayur Tripp DO     Last Assessment & Plan:   Stable. Essential (primary) hypertension ICD-10-CM: I10  ICD-9-CM: 401.9  10/28/2016 - Present Yes    Overview Signed 1/9/2018 10:12 AM by Mayur Tripp DO     Last Assessment & Plan:   Stable. Hyperlipidemia ICD-10-CM: E78.5  ICD-9-CM: 272.4  10/28/2016 - Present Yes    Overview Signed 1/9/2018 10:12 AM by Mayur Tripp DO     Last Assessment & Plan:   Return for fasting labs. Insomnia, persistent ICD-10-CM: G47.00  ICD-9-CM: 307.42  4/27/2016 - Present Yes    Overview Signed 1/9/2018 10:12 AM by Mayur Tripp DO     Last Assessment & Plan:   Restoril prescription printed & given. Primary osteoarthritis involving multiple joints ICD-10-CM: M89.49  ICD-9-CM: 715.98  7/31/2015 - Present Yes    Overview Signed 1/9/2018 10:12 AM by Mayur Tripp DO     Last Assessment & Plan:   3 Norco scripts printed with do not fill before dates of 10/10, 11/9, 12/9. Tramadol prescription printed & given (decreased from 150 to 120 tablets per month based on how patient is taking it). Aware of risk of sedation and aware to get prescriptions only in our office. Discussed polypharmacy & fall risk with patient's multiple sedating medications, encouraged to limit use as much as possible. Will have her see Dr. Aurelia Anne for next follow up to review pain medication regimen. Pt verbalized understanding, agreed with plan.                    Objective:     Patient Vitals for the past 24 hrs:   Temp Pulse Resp BP SpO2   02/10/22 0719 97.4 °F (36.3 °C) (!) 51 18 102/65 99 %   02/10/22 0313 98.4 °F (36.9 °C) (!) 54 21 (!) 115/52 99 %   02/09/22 2331 98.3 °F (36.8 °C) (!) 55 22 (!) 101/57 97 %   02/09/22 2214 -- (!) 54 -- -- --   02/09/22 2118 -- (!) 56 -- 110/60 -- 02/09/22 1932 98.6 °F (37 °C) (!) 59 21 (!) 79/54 98 %   02/09/22 1604 97.6 °F (36.4 °C) (!) 55 21 108/62 98 %   02/09/22 1207 97.7 °F (36.5 °C) (!) 58 20 104/60 100 %     Oxygen Therapy  O2 Sat (%): 99 % (02/10/22 0719)  Pulse via Oximetry: 56 beats per minute (02/09/22 0853)  O2 Device: Hi flow nasal cannula; Heated (02/10/22 1913)  Skin Assessment: Clean, dry, & intact (02/09/22 0740)  Skin Protection for O2 Device: No (02/09/22 0740)  O2 Flow Rate (L/min): 50 l/min (02/10/22 0333)  O2 Temperature: 87.8 °F (31 °C) (02/09/22 0740)  FIO2 (%): 55 % (02/10/22 0333)    Estimated body mass index is 25.92 kg/m² as calculated from the following:    Height as of this encounter: 5' 1\" (1.549 m). Weight as of this encounter: 62.2 kg (137 lb 3.2 oz). Intake/Output Summary (Last 24 hours) at 2/10/2022 0858  Last data filed at 2/9/2022 1804  Gross per 24 hour   Intake 500 ml   Output --   Net 500 ml         Physical Exam:     Blood pressure 102/65, pulse (!) 51, temperature 97.4 °F (36.3 °C), resp. rate 18, height 5' 1\" (1.549 m), weight 62.2 kg (137 lb 3.2 oz), SpO2 99 %. General:    Well nourished. No overt distress  Head:  Normocephalic, atraumatic  Eyes:  Sclerae appear normal.  Pupils equally round. ENT:  Nares appear normal, no drainage. Moist oral mucosa  Neck:  No restricted ROM. Trachea midline   CV:   RRR. .  Lungs:   Mild increased WOB. Speaking in full sentences   Abdomen: Bowel sounds present. Soft, nontender, nondistended. Extremities: No cyanosis or clubbing. No edema  Skin:     No rashes and normal coloration. Warm and dry. Neuro:  CN II-XII grossly intact. Sensation intact. A&Ox3  Psych:  Normal mood and affect.       I have reviewed ordered lab tests and independently visualized imaging below:    Recent Labs:  Recent Results (from the past 48 hour(s))   METABOLIC PANEL, COMPREHENSIVE    Collection Time: 02/09/22  6:16 AM   Result Value Ref Range    Sodium 139 136 - 145 mmol/L Potassium 4.1 3.5 - 5.1 mmol/L    Chloride 101 98 - 107 mmol/L    CO2 37 (H) 21 - 32 mmol/L    Anion gap 1 (L) 7 - 16 mmol/L    Glucose 122 (H) 65 - 100 mg/dL    BUN 32 (H) 8 - 23 MG/DL    Creatinine 0.80 0.6 - 1.0 MG/DL    GFR est AA >60 >60 ml/min/1.73m2    GFR est non-AA >60 >60 ml/min/1.73m2    Calcium 8.6 8.3 - 10.4 MG/DL    Bilirubin, total 0.3 0.2 - 1.1 MG/DL    ALT (SGPT) 28 12 - 65 U/L    AST (SGOT) 28 15 - 37 U/L    Alk. phosphatase 84 50 - 136 U/L    Protein, total 5.3 (L) 6.3 - 8.2 g/dL    Albumin 2.5 (L) 3.2 - 4.6 g/dL    Globulin 2.8 2.3 - 3.5 g/dL    A-G Ratio 0.9 (L) 1.2 - 3.5     CBC WITH AUTOMATED DIFF    Collection Time: 02/09/22  6:16 AM   Result Value Ref Range    WBC 5.5 4.3 - 11.1 K/uL    RBC 3.53 (L) 4.05 - 5.2 M/uL    HGB 10.3 (L) 11.7 - 15.4 g/dL    HCT 32.5 (L) 35.8 - 46.3 %    MCV 92.1 79.6 - 97.8 FL    MCH 29.2 26.1 - 32.9 PG    MCHC 31.7 31.4 - 35.0 g/dL    RDW 13.7 11.9 - 14.6 %    PLATELET 850 169 - 218 K/uL    MPV 11.0 9.4 - 12.3 FL    ABSOLUTE NRBC 0.00 0.0 - 0.2 K/uL    DF AUTOMATED      NEUTROPHILS 53 43 - 78 %    LYMPHOCYTES 31 13 - 44 %    MONOCYTES 12 4.0 - 12.0 %    EOSINOPHILS 3 0.5 - 7.8 %    BASOPHILS 0 0.0 - 2.0 %    IMMATURE GRANULOCYTES 1 0.0 - 5.0 %    ABS. NEUTROPHILS 2.9 1.7 - 8.2 K/UL    ABS. LYMPHOCYTES 1.7 0.5 - 4.6 K/UL    ABS. MONOCYTES 0.7 0.1 - 1.3 K/UL    ABS. EOSINOPHILS 0.2 0.0 - 0.8 K/UL    ABS. BASOPHILS 0.0 0.0 - 0.2 K/UL    ABS. IMM.  GRANS. 0.0 0.0 - 0.5 K/UL   METABOLIC PANEL, BASIC    Collection Time: 02/10/22  5:35 AM   Result Value Ref Range    Sodium 141 136 - 145 mmol/L    Potassium 4.1 3.5 - 5.1 mmol/L    Chloride 103 98 - 107 mmol/L    CO2 33 (H) 21 - 32 mmol/L    Anion gap 5 (L) 7 - 16 mmol/L    Glucose 120 (H) 65 - 100 mg/dL    BUN 33 (H) 8 - 23 MG/DL    Creatinine 0.70 0.6 - 1.0 MG/DL    GFR est AA >60 >60 ml/min/1.73m2    GFR est non-AA >60 >60 ml/min/1.73m2    Calcium 8.6 8.3 - 10.4 MG/DL   C REACTIVE PROTEIN, QT    Collection Time: 02/10/22 5:35 AM   Result Value Ref Range    C-Reactive protein <0.3 0.0 - 0.9 mg/dL       All Micro Results     Procedure Component Value Units Date/Time    CULTURE, URINE [[id number]]  (Abnormal)  (Susceptibility) Collected: 01/28/22 1105    Order Status: Completed Specimen: Urine from Clean catch Updated: 01/30/22 0813     Special Requests: NO SPECIAL REQUESTS        Culture result:       >100,000 COLONIES/mL ESCHERICHIA COLI                  <1,000 CFU/ML MIXED SKIN SCOTT ISOLATED          COVID-19 RAPID TEST [[id number]]  (Abnormal) Collected: 01/26/22 1258    Order Status: Completed Specimen: Nasopharyngeal Updated: 01/26/22 1316     Specimen source NASAL        COVID-19 rapid test Detected        Comment:      The specimen is POSITIVE for SARS-CoV-2, the novel coronavirus associated with COVID-19. This test has been authorized by the FDA under an Emergency Use Authorization (EUA) for use by authorized laboratories. Fact sheet for Healthcare Providers: CapLinkeddate.co.nz  Fact sheet for Patients: CommuniClique.co.nz       Methodology: Isothermal Nucleic Acid Amplification               Other Studies:  No results found.     Current Meds:  Current Facility-Administered Medications   Medication Dose Route Frequency    metoprolol tartrate (LOPRESSOR) tablet 12.5 mg  12.5 mg Oral Q12H    temazepam (RESTORIL) capsule 30 mg  30 mg Oral QHS    midodrine (PROAMATINE) tablet 5 mg  5 mg Oral TID WITH MEALS    lidocaine 4 % patch 1 Patch  1 Patch TransDERmal Q24H    gabapentin (NEURONTIN) capsule 800 mg  800 mg Oral BID    escitalopram oxalate (LEXAPRO) tablet 5 mg  5 mg Oral QPM    melatonin tablet 5 mg  5 mg Oral QHS    lip protectant (BLISTEX) ointment 1 Each  1 Each Topical PRN    apixaban (ELIQUIS) tablet 5 mg  5 mg Oral BID    traMADoL (ULTRAM) tablet 100 mg  100 mg Oral Q8H PRN    dexlansoprazole (DEXILANT) capsule (delayed release) CpDB 60 mg (Patient Supplied)  60 mg Oral DAILY    sodium chloride (NS) flush 5-10 mL  5-10 mL IntraVENous Q8H    sodium chloride (NS) flush 5-10 mL  5-10 mL IntraVENous PRN    sodium chloride (NS) flush 5-40 mL  5-40 mL IntraVENous Q8H    sodium chloride (NS) flush 5-40 mL  5-40 mL IntraVENous PRN    acetaminophen (TYLENOL) tablet 650 mg  650 mg Oral Q6H PRN    Or    acetaminophen (TYLENOL) suppository 650 mg  650 mg Rectal Q6H PRN    polyethylene glycol (MIRALAX) packet 17 g  17 g Oral DAILY PRN    ondansetron (ZOFRAN ODT) tablet 4 mg  4 mg Oral Q8H PRN    Or    ondansetron (ZOFRAN) injection 4 mg  4 mg IntraVENous Q6H PRN    guaiFENesin-dextromethorphan (ROBITUSSIN DM) 100-10 mg/5 mL syrup 5 mL  5 mL Oral Q4H PRN    cholecalciferol (VITAMIN D3) (1000 Units /25 mcg) tablet 2,000 Units  2,000 Units Oral DAILY    alum-mag hydroxide-simeth (MYLANTA) oral suspension 15 mL  15 mL Oral Q6H PRN    albuterol (PROVENTIL HFA, VENTOLIN HFA, PROAIR HFA) inhaler 2 Puff  2 Puff Inhalation Q4H PRN    atorvastatin (LIPITOR) tablet 40 mg  40 mg Oral QHS       Signed:  Sarah Uribe DO    Part of this note may have been written by using a voice dictation software. The note has been proof read but may still contain some grammatical/other typographical errors.

## 2022-02-10 NOTE — PROGRESS NOTES
Pt is resting in bed at this time. Pt is on Airvo 50L/55%. Pt has no s/sx of acute distress at this time. Pt has call light within reach.  Report given to [employer]

## 2022-02-11 LAB
ATRIAL RATE: 56 BPM
BASOPHILS # BLD: 0 K/UL (ref 0–0.2)
BASOPHILS NFR BLD: 1 % (ref 0–2)
BNP SERPL-MCNC: 254 PG/ML
CALCULATED P AXIS, ECG09: 41 DEGREES
CALCULATED R AXIS, ECG10: -10 DEGREES
CALCULATED T AXIS, ECG11: 10 DEGREES
D DIMER PPP FEU-MCNC: 1.2 UG/ML(FEU)
DIAGNOSIS, 93000: NORMAL
DIFFERENTIAL METHOD BLD: ABNORMAL
EOSINOPHIL # BLD: 0.1 K/UL (ref 0–0.8)
EOSINOPHIL NFR BLD: 2 % (ref 0.5–7.8)
ERYTHROCYTE [DISTWIDTH] IN BLOOD BY AUTOMATED COUNT: 14 % (ref 11.9–14.6)
HCT VFR BLD AUTO: 33.1 % (ref 35.8–46.3)
HGB BLD-MCNC: 10.3 G/DL (ref 11.7–15.4)
IMM GRANULOCYTES # BLD AUTO: 0 K/UL (ref 0–0.5)
IMM GRANULOCYTES NFR BLD AUTO: 0 % (ref 0–5)
LYMPHOCYTES # BLD: 1.4 K/UL (ref 0.5–4.6)
LYMPHOCYTES NFR BLD: 28 % (ref 13–44)
MCH RBC QN AUTO: 28.9 PG (ref 26.1–32.9)
MCHC RBC AUTO-ENTMCNC: 31.1 G/DL (ref 31.4–35)
MCV RBC AUTO: 92.7 FL (ref 79.6–97.8)
MONOCYTES # BLD: 0.7 K/UL (ref 0.1–1.3)
MONOCYTES NFR BLD: 14 % (ref 4–12)
NEUTS SEG # BLD: 2.9 K/UL (ref 1.7–8.2)
NEUTS SEG NFR BLD: 56 % (ref 43–78)
NRBC # BLD: 0 K/UL (ref 0–0.2)
P-R INTERVAL, ECG05: 144 MS
PLATELET # BLD AUTO: 234 K/UL (ref 150–450)
PMV BLD AUTO: 11.1 FL (ref 9.4–12.3)
PROCALCITONIN SERPL-MCNC: <0.05 NG/ML (ref 0–0.49)
Q-T INTERVAL, ECG07: 408 MS
QRS DURATION, ECG06: 92 MS
QTC CALCULATION (BEZET), ECG08: 393 MS
RBC # BLD AUTO: 3.57 M/UL (ref 4.05–5.2)
VENTRICULAR RATE, ECG03: 56 BPM
WBC # BLD AUTO: 5.1 K/UL (ref 4.3–11.1)

## 2022-02-11 PROCEDURE — 74011250637 HC RX REV CODE- 250/637: Performed by: FAMILY MEDICINE

## 2022-02-11 PROCEDURE — 74011000250 HC RX REV CODE- 250: Performed by: FAMILY MEDICINE

## 2022-02-11 PROCEDURE — 77010033711 HC HIGH FLOW OXYGEN

## 2022-02-11 PROCEDURE — 97535 SELF CARE MNGMENT TRAINING: CPT

## 2022-02-11 PROCEDURE — 74011250637 HC RX REV CODE- 250/637: Performed by: STUDENT IN AN ORGANIZED HEALTH CARE EDUCATION/TRAINING PROGRAM

## 2022-02-11 PROCEDURE — 74011000250 HC RX REV CODE- 250: Performed by: STUDENT IN AN ORGANIZED HEALTH CARE EDUCATION/TRAINING PROGRAM

## 2022-02-11 PROCEDURE — 83880 ASSAY OF NATRIURETIC PEPTIDE: CPT

## 2022-02-11 PROCEDURE — 94762 N-INVAS EAR/PLS OXIMTRY CONT: CPT

## 2022-02-11 PROCEDURE — 85025 COMPLETE CBC W/AUTO DIFF WBC: CPT

## 2022-02-11 PROCEDURE — 97530 THERAPEUTIC ACTIVITIES: CPT

## 2022-02-11 PROCEDURE — 84145 PROCALCITONIN (PCT): CPT

## 2022-02-11 PROCEDURE — 85379 FIBRIN DEGRADATION QUANT: CPT

## 2022-02-11 PROCEDURE — 65660000000 HC RM CCU STEPDOWN

## 2022-02-11 RX ORDER — ESCITALOPRAM OXALATE 10 MG/1
10 TABLET ORAL EVERY EVENING
Status: DISCONTINUED | OUTPATIENT
Start: 2022-02-11 | End: 2022-02-16 | Stop reason: HOSPADM

## 2022-02-11 RX ADMIN — MIDODRINE HYDROCHLORIDE 5 MG: 5 TABLET ORAL at 08:37

## 2022-02-11 RX ADMIN — SODIUM CHLORIDE, PRESERVATIVE FREE 5 ML: 5 INJECTION INTRAVENOUS at 14:00

## 2022-02-11 RX ADMIN — APIXABAN 5 MG: 5 TABLET, FILM COATED ORAL at 21:43

## 2022-02-11 RX ADMIN — GUAIFENESIN SYRUP AND DEXTROMETHORPHAN 5 ML: 100; 10 SYRUP ORAL at 08:37

## 2022-02-11 RX ADMIN — GABAPENTIN 800 MG: 400 CAPSULE ORAL at 17:18

## 2022-02-11 RX ADMIN — SODIUM CHLORIDE, PRESERVATIVE FREE 10 ML: 5 INJECTION INTRAVENOUS at 21:18

## 2022-02-11 RX ADMIN — Medication 5 MG: at 21:43

## 2022-02-11 RX ADMIN — APIXABAN 5 MG: 5 TABLET, FILM COATED ORAL at 08:37

## 2022-02-11 RX ADMIN — DEXLANSOPRAZOLE 60 MG: 60 CAPSULE, DELAYED RELEASE ORAL at 09:00

## 2022-02-11 RX ADMIN — TEMAZEPAM 30 MG: 15 CAPSULE ORAL at 21:43

## 2022-02-11 RX ADMIN — TRAMADOL HYDROCHLORIDE 100 MG: 50 TABLET, COATED ORAL at 08:36

## 2022-02-11 RX ADMIN — SODIUM CHLORIDE, PRESERVATIVE FREE 10 ML: 5 INJECTION INTRAVENOUS at 05:01

## 2022-02-11 RX ADMIN — ATORVASTATIN CALCIUM 40 MG: 40 TABLET, FILM COATED ORAL at 21:43

## 2022-02-11 RX ADMIN — MIDODRINE HYDROCHLORIDE 5 MG: 5 TABLET ORAL at 11:32

## 2022-02-11 RX ADMIN — MIDODRINE HYDROCHLORIDE 5 MG: 5 TABLET ORAL at 16:49

## 2022-02-11 RX ADMIN — VITAMIN D, TAB 1000IU (100/BT) 2000 UNITS: 25 TAB at 08:36

## 2022-02-11 RX ADMIN — TRAMADOL HYDROCHLORIDE 100 MG: 50 TABLET, COATED ORAL at 16:49

## 2022-02-11 RX ADMIN — ESCITALOPRAM OXALATE 10 MG: 10 TABLET ORAL at 18:00

## 2022-02-11 RX ADMIN — GABAPENTIN 800 MG: 400 CAPSULE ORAL at 08:36

## 2022-02-11 NOTE — PROGRESS NOTES
Hospitalist Progress Note   Admit Date:  2022 12:32 PM   Name:  Luli Kennedy   Age:  80 y.o. Sex:  female  :  1935   MRN:  [id number]   Room:  [facility]/    Presenting Complaint: Positive For Covid-19    Reason(s) for Admission: Acute hypoxemic respiratory failure due to COVID-19 [facility]) [U07.1, J96.01]     Hospital Course & Interval History:     Naya Crowder a 80 y.o. female with medical history of HTN, mixed HLD, GERD, Glaucoma, Hay fever,  HLD, insomnia who presented from home via EMS with hypoxia and AMS with recent diagnosis of COVID-19. EMS reports patient was hypoxic on arrival, O2 saturation in the 60s.  Placed on 6 L and given terbutaline.  EMS also noted family some concern for patient being more altered than normal.     In ED, T102 BP 91/56    spo2 74% RA, 85% 6L NC, 91% 55L/m  LA 3.9 CRP 17.3 D dimer 3.85 NT pro BNP 3481 HS trop 1544>1609   CXR BL infiltrates   rec'd Tylenol 1000 mg and decadron 10 mg IV    EKG shows sinus tachycardia, rate 115, , QRS 92, QTc 491  S/p actemra on     Seen by cardiology for elevated troponins and new onset afib. Improved with BB and DOAC. Developed E coli UTI s/p course rocephin     Subjective/24hr Events (22): No acute events ON. satting 98% during my eval. SOB and cough stable. ROS:  Neg CP, SI, fever, chills   Assessment & Plan:   Acute Hypoxemic Respiratory Failure 2/2 COVID-19 PNA   Improving   - COVID +   - Unvaccinated   - s/p Decadron 6 mg x 10 doses   - s/p Actemra on admission then was started on Baricitinib x 8 doses, last dose   - Charted O2 Flow Rate (L/min): 50 l/min. Wean as able  - Awake proning as tolerated, anti-tussive PRN, Bronchodilators   Repeat XR yesterday with improved infiltrates, possible L pleural effusion. Unable to diuresis due to BP. Cont. Monitor     Hypotension - associated with COVID-19.  Suspected dysautonomica vs corticosteroid withdrawal. BB was reduced and now held due to lesly and hypotension. Bradycardia - holding BB. See above. Check mag. QT prolongation - repeat EKG on 2/10 . Responded to medical therapy. S/p AFIB RVR // Demand ischemia - stable on BB and DOAC. Evaluated by cardiology. 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 Repeat EKG with normal QTc on 2/10. Increase to theraputic dose 10 mg.     insomonia - chronically on restoril at bedtime.  Increases risk of dementia, pna and falls     Chronic pain - cont gabapentin       Dispo/Discharge Planning:    Consider [facility] if insurance allows versus [facility] allows     Diet:  ADULT DIET Easy to Chew; Low Fat/Low Chol/High Fiber/EVA  ADULT ORAL NUTRITION SUPPLEMENT Breakfast, Dinner, Lunch; Standard High Calorie/High Protein  DVT PPx: doac  Code status: Full Code    Hospital Problems as of 2/11/2022 Date Reviewed: 4/12/2018          Codes Class Noted - Resolved POA    * (Principal) Acute hypoxemic respiratory failure due to [address]) ICD-10-CM: U07.1, J96.01  ICD-9-CM: 518.81, 079.89, 799.02  1/26/2022 - Present Yes        Sepsis due to [address]) ICD-10-CM: U07.1, A41.89  ICD-9-CM: 079.89, 995.91  1/26/2022 - Present Yes        Hypotension ICD-10-CM: I95.9  ICD-9-CM: 458.9  2/9/2022 - Present Unknown        UTI (urinary tract infection) ICD-10-CM: N39.0  ICD-9-CM: 599.0  2/3/2022 - Present Unknown        Hypomagnesemia ICD-10-CM: E83.42  ICD-9-CM: 275.2  1/26/2022 - Present Yes        Hypokalemia ICD-10-CM: E87.6  ICD-9-CM: 276.8  1/26/2022 - Present Yes        Prolonged Q-T interval on ECG ICD-10-CM: R94.31  ICD-9-CM: 794.31  1/26/2022 - Present Yes        Elevated troponin ICD-10-CM: R77.8  ICD-9-CM: 790.6  1/26/2022 - Present Yes        Toxic metabolic encephalopathy LHS-49-VM: G92.8  ICD-9-CM: 349.82  1/26/2022 - Present Yes        Chronic prescription benzodiazepine use ICD-10-CM: Z79.899  ICD-9-CM: V58.69  1/26/2022 - Present Yes        Hypercoagulable state associated with COVID-19 [facility]) ICD-10-CM: U07.1, D68.69  ICD-9-CM: 289.82, 079.89  1/26/2022 - Present Yes        Transaminitis ICD-10-CM: R74.01  ICD-9-CM: 790.4  1/26/2022 - Present Yes        Gastroesophageal reflux disease without esophagitis ICD-10-CM: K21.9  ICD-9-CM: 530.81  9/28/2017 - Present Yes    Overview Signed 1/9/2018 10:12 AM by Sushma Resendiz DO     Last Assessment & Plan:   Stable. Essential (primary) hypertension ICD-10-CM: I10  ICD-9-CM: 401.9  10/28/2016 - Present Yes    Overview Signed 1/9/2018 10:12 AM by Sushma Resendiz DO     Last Assessment & Plan:   Stable. Hyperlipidemia ICD-10-CM: E78.5  ICD-9-CM: 272.4  10/28/2016 - Present Yes    Overview Signed 1/9/2018 10:12 AM by Sushma Resendiz DO     Last Assessment & Plan:   Return for fasting labs. Insomnia, persistent ICD-10-CM: G47.00  ICD-9-CM: 307.42  4/27/2016 - Present Yes    Overview Signed 1/9/2018 10:12 AM by Sushma Resendiz DO     Last Assessment & Plan:   Restoril prescription printed & given. Primary osteoarthritis involving multiple joints ICD-10-CM: M89.49  ICD-9-CM: 715.98  7/31/2015 - Present Yes    Overview Signed 1/9/2018 10:12 AM by Sushma Resendiz DO     Last Assessment & Plan:   3 Norco scripts printed with do not fill before dates of 10/10, 11/9, 12/9. Tramadol prescription printed & given (decreased from 150 to 120 tablets per month based on how patient is taking it). Aware of risk of sedation and aware to get prescriptions only in our office. Discussed polypharmacy & fall risk with patient's multiple sedating medications, encouraged to limit use as much as possible. Will have her see Dr. Kye Castillo for next follow up to review pain medication regimen. Pt verbalized understanding, agreed with plan.                    Objective:     Patient Vitals for the past 24 hrs:   Temp Pulse Resp BP SpO2   02/11/22 0650 -- (!) 55 -- 94/64 --   02/11/22 0516 -- 60 -- -- --   02/11/22 0312 97.6 °F (36.4 °C) (!) 57 21 (!) 96/50 97 %   02/10/22 2313 98.5 °F (36.9 °C) (!) 48 21 101/87 92 %   02/10/22 1947 -- (!) 56 -- -- --   02/10/22 1927 98.2 °F (36.8 °C) (!) 58 21 96/75 98 %   02/10/22 1510 98.1 °F (36.7 °C) 69 18 93/65 98 %   02/10/22 1121 97.5 °F (36.4 °C) (!) 54 18 104/67 97 %   02/10/22 0930 -- -- -- -- 100 %   02/10/22 0800 -- (!) 54 -- -- --   02/10/22 0719 97.4 °F (36.3 °C) (!) 51 18 102/65 99 %     Oxygen Therapy  O2 Sat (%): 97 % (02/11/22 0312)  Pulse via Oximetry: 57 beats per minute (02/10/22 0930)  O2 Device: Heated; Hi flow nasal cannula (02/11/22 8700)  Skin Assessment: Clean, dry, & intact (02/10/22 0930)  Skin Protection for O2 Device: No (02/10/22 0930)  O2 Flow Rate (L/min): 50 l/min (02/11/22 0312)  O2 Temperature: 87.8 °F (31 °C) (02/10/22 0930)  FIO2 (%): 50 % (02/11/22 0312)    Estimated body mass index is 25.92 kg/m² as calculated from the following:    Height as of this encounter: 5' 1\" (1.549 m). Weight as of this encounter: 62.2 kg (137 lb 3.2 oz). Intake/Output Summary (Last 24 hours) at 2/11/2022 0659  Last data filed at 2/10/2022 1831  Gross per 24 hour   Intake 360 ml   Output --   Net 360 ml         Physical Exam:     Blood pressure 94/64, pulse (!) 55, temperature 97.6 °F (36.4 °C), resp. rate 21, height 5' 1\" (1.549 m), weight 62.2 kg (137 lb 3.2 oz), SpO2 97 %. Physical Exam  Vitals and nursing note reviewed. Constitutional:       General: She is not in acute distress. HENT:      Mouth/Throat:      Mouth: Mucous membranes are moist.   Cardiovascular:      Rate and Rhythm: Regular rhythm. Bradycardia present. Pulmonary:      Effort: Pulmonary effort is normal.   Neurological:      Mental Status: She is alert and oriented to person, place, and time.    Psychiatric:         Mood and Affect: Mood normal.         I have reviewed ordered lab tests and independently visualized imaging below:    Recent Labs:  Recent Results (from the past 48 hour(s))   METABOLIC PANEL, BASIC    Collection Time: 02/10/22  5:35 AM   Result Value Ref Range    Sodium 141 136 - 145 mmol/L    Potassium 4.1 3.5 - 5.1 mmol/L    Chloride 103 98 - 107 mmol/L    CO2 33 (H) 21 - 32 mmol/L    Anion gap 5 (L) 7 - 16 mmol/L    Glucose 120 (H) 65 - 100 mg/dL    BUN 33 (H) 8 - 23 MG/DL    Creatinine 0.70 0.6 - 1.0 MG/DL    GFR est AA >60 >60 ml/min/1.73m2    GFR est non-AA >60 >60 ml/min/1.73m2    Calcium 8.6 8.3 - 10.4 MG/DL   C REACTIVE PROTEIN, QT    Collection Time: 02/10/22  5:35 AM   Result Value Ref Range    C-Reactive protein <0.3 0.0 - 0.9 mg/dL   EKG, 12 LEAD, SUBSEQUENT    Collection Time: 02/10/22  8:29 PM   Result Value Ref Range    Ventricular Rate 56 BPM    Atrial Rate 56 BPM    P-R Interval 144 ms    QRS Duration 92 ms    Q-T Interval 408 ms    QTC Calculation (Bezet) 393 ms    Calculated P Axis 41 degrees    Calculated R Axis -10 degrees    Calculated T Axis 10 degrees    Diagnosis       Sinus bradycardia  Minimal voltage criteria for LVH, may be normal variant  Nonspecific T wave abnormality  Abnormal ECG  When compared with ECG of 29-JAN-2022 13:38,  Sinus rhythm has replaced Atrial fibrillation  Vent.  rate has decreased  BPM  ST no longer depressed in Anterolateral leads  T wave inversion less evident in Inferior leads  T wave inversion no longer evident in Lateral leads  Confirmed by [facility] [place]  MD (), Feli LOVE ([id number]) on 2/11/2022 6:23:13 AM     PROCALCITONIN    Collection Time: 02/11/22  4:56 AM   Result Value Ref Range    Procalcitonin <0.05 0.00 - 0.49 ng/mL   D DIMER    Collection Time: 02/11/22  4:56 AM   Result Value Ref Range    D DIMER 1.20 (H) <0.56 ug/ml(FEU)   CBC WITH AUTOMATED DIFF    Collection Time: 02/11/22  4:56 AM   Result Value Ref Range    WBC 5.1 4.3 - 11.1 K/uL    RBC 3.57 (L) 4.05 - 5.2 M/uL    HGB 10.3 (L) 11.7 - 15.4 g/dL    HCT 33.1 (L) 35.8 - 46.3 %    MCV 92.7 79.6 - 97.8 FL    MCH 28.9 26.1 - 32.9 PG    MCHC 31.1 (L) 31.4 - 35.0 g/dL    RDW 14.0 11.9 - 14.6 %    PLATELET 456 787 - 027 K/uL    MPV 11.1 9.4 - 12.3 FL    ABSOLUTE NRBC 0.00 0.0 - 0.2 K/uL    DF AUTOMATED      NEUTROPHILS 56 43 - 78 %    LYMPHOCYTES 28 13 - 44 %    MONOCYTES 14 (H) 4.0 - 12.0 %    EOSINOPHILS 2 0.5 - 7.8 %    BASOPHILS 1 0.0 - 2.0 %    IMMATURE GRANULOCYTES 0 0.0 - 5.0 %    ABS. NEUTROPHILS 2.9 1.7 - 8.2 K/UL    ABS. LYMPHOCYTES 1.4 0.5 - 4.6 K/UL    ABS. MONOCYTES 0.7 0.1 - 1.3 K/UL    ABS. EOSINOPHILS 0.1 0.0 - 0.8 K/UL    ABS. BASOPHILS 0.0 0.0 - 0.2 K/UL    ABS. IMM. GRANS. 0.0 0.0 - 0.5 K/UL   NT-PRO BNP    Collection Time: 02/11/22  4:56 AM   Result Value Ref Range    NT pro- <450 PG/ML       All Micro Results     Procedure Component Value Units Date/Time    CULTURE, URINE [[id number]]  (Abnormal)  (Susceptibility) Collected: 01/28/22 1105    Order Status: Completed Specimen: Urine from Clean catch Updated: 01/30/22 0843     Special Requests: NO SPECIAL REQUESTS        Culture result:       >100,000 COLONIES/mL ESCHERICHIA COLI                  <1,000 CFU/ML MIXED SKIN SCOTT ISOLATED          COVID-19 RAPID TEST [[id number]]  (Abnormal) Collected: 01/26/22 1258    Order Status: Completed Specimen: Nasopharyngeal Updated: 01/26/22 1316     Specimen source NASAL        COVID-19 rapid test Detected        Comment:      The specimen is POSITIVE for SARS-CoV-2, the novel coronavirus associated with COVID-19. This test has been authorized by the FDA under an Emergency Use Authorization (EUA) for use by authorized laboratories. Fact sheet for Healthcare Providers: ConventionUpdate.co.nz  Fact sheet for Patients: ConventionUpdate.co.nz       Methodology: Isothermal Nucleic Acid Amplification               Other Studies:  XR CHEST SNGL V    Result Date: 2/10/2022  EXAM: CHEST X-RAY, 1 VIEW INDICATION: check infilatrates.  COMPARISON: Chest x-ray 1/30/2022 TECHNIQUE: Single AP view of the chest was obtained. FINDINGS: Mildly improved multifocal bilateral lung infiltrates. Some mixed interstitial and alveolar opacities remain throughout both lungs. No pneumothorax. Blunting of the left costophrenic angle. The cardiac silhouette is enlarged. Left apex scoliosis centered at L1.     1.  Mildly improved multifocal infiltrates. 2.  Possible left pleural effusion.        Current Meds:  Current Facility-Administered Medications   Medication Dose Route Frequency    metoprolol tartrate (LOPRESSOR) tablet 12.5 mg  12.5 mg Oral Q12H    temazepam (RESTORIL) capsule 30 mg  30 mg Oral QHS    midodrine (PROAMATINE) tablet 5 mg  5 mg Oral TID WITH MEALS    lidocaine 4 % patch 1 Patch  1 Patch TransDERmal Q24H    gabapentin (NEURONTIN) capsule 800 mg  800 mg Oral BID    escitalopram oxalate (LEXAPRO) tablet 5 mg  5 mg Oral QPM    melatonin tablet 5 mg  5 mg Oral QHS    lip protectant (BLISTEX) ointment 1 Each  1 Each Topical PRN    apixaban (ELIQUIS) tablet 5 mg  5 mg Oral BID    traMADoL (ULTRAM) tablet 100 mg  100 mg Oral Q8H PRN    dexlansoprazole (DEXILANT) capsule (delayed release) CpDB 60 mg (Patient Supplied)  60 mg Oral DAILY    sodium chloride (NS) flush 5-10 mL  5-10 mL IntraVENous Q8H    sodium chloride (NS) flush 5-10 mL  5-10 mL IntraVENous PRN    sodium chloride (NS) flush 5-40 mL  5-40 mL IntraVENous Q8H    sodium chloride (NS) flush 5-40 mL  5-40 mL IntraVENous PRN    acetaminophen (TYLENOL) tablet 650 mg  650 mg Oral Q6H PRN    Or    acetaminophen (TYLENOL) suppository 650 mg  650 mg Rectal Q6H PRN    polyethylene glycol (MIRALAX) packet 17 g  17 g Oral DAILY PRN    ondansetron (ZOFRAN ODT) tablet 4 mg  4 mg Oral Q8H PRN    Or    ondansetron (ZOFRAN) injection 4 mg  4 mg IntraVENous Q6H PRN    guaiFENesin-dextromethorphan (ROBITUSSIN DM) 100-10 mg/5 mL syrup 5 mL  5 mL Oral Q4H PRN    cholecalciferol (VITAMIN D3) (1000 Units /25 mcg) tablet 2,000 Units  2,000 Units Oral DAILY    alum-mag hydroxide-simeth (MYLANTA) oral suspension 15 mL  15 mL Oral Q6H PRN    albuterol (PROVENTIL HFA, VENTOLIN HFA, PROAIR HFA) inhaler 2 Puff  2 Puff Inhalation Q4H PRN    atorvastatin (LIPITOR) tablet 40 mg  40 mg Oral QHS       Signed:  Nini Wilkins DO    Part of this note may have been written by using a voice dictation software. The note has been proof read but may still contain some grammatical/other typographical errors.

## 2022-02-11 NOTE — PROGRESS NOTES
ACUTE OT GOALS:  (Developed with and agreed upon by patient and/or caregiver.)  1) Patient will complete upper body bathing and dressing with SET UP and adaptive equipment as needed. 2) Patient will complete lower body bathing and dressing with SBA and adaptive equipment as needed. 2) Patient will complete toileting with SBA. 3) Patient will complete functional transfers with SUPERVISION and adaptive equipment as needed. 4) Patient will tolerate at least 25 minutes of OT activity with 1-2 rest breaks while maintaining O2 sats >90%. 5) Patient will verbalize at least 3 energy conservation technique to utilize during ADL/IADL. Timeframe: 7 visits       OCCUPATIONAL THERAPY: Daily Note OT Treatment Day # 4    Homer Trinidad is a 80 y.o. female   PRIMARY DIAGNOSIS: Acute hypoxemic respiratory failure due to COVID-19 [facility]  Acute hypoxemic respiratory failure [facility] [U07.1, J96.01]       Payor: [employer] MEDICARE / Plan: [address] / Product Type: [employer] /   ASSESSMENT:     REHAB RECOMMENDATIONS: CURRENT LEVEL OF FUNCTION:  (Most Recently Demonstrated)   Recommendation to date pending progress:  [address] [place] [address] Therapy  Equipment:    To Be Determined Bathing:   Not tested  Dressing:   Not tested  Feeding/Grooming:   Moderate Assistance to wash hair with shampoo cap  Toileting:   Supervision  Functional Mobility:   SBA/CGA     ASSESSMENT:  Ms. Stephy Ramírez presents up on the [facility] upon arrival. Pt able to complete toilet hygiene independently and required max a to don a brief. Pt participated in transfers and functional mobility with SBA/CGA and a rolling walker. Pt sat edge of bed for several minutes while completing grooming and hygiene activities. Returned to supine post treatment. Good progress. Continue POC. SUBJECTIVE:   Ms. Stephy Ramírez states, \"I miss my family. I like to be with people. \"    SOCIAL HISTORY/LIVING ENVIRONMENT: Support Systems: Spouse/Significant Other,Child(sky)    OBJECTIVE:     PAIN: VITAL SIGNS: LINES/DRAINS:   Pre Treatment: Pain Screen  Pain Scale 1: Numeric (0 - 10)  Pain Intensity 1: 0  Post Treatment: 0   Continuous Pulse Oximetry  O2 Device: Heated,Hi flow nasal cannula,Humidifier     ACTIVITIES OF DAILY LIVING: I Mod I S SBA CGA Min Mod Max Total NT Comments   BASIC ADLs:              Bathing/ Showering [] [] [] [] [] [] [] [] [] [x]    Toileting [] [] [] [x] [] [] [] [] [] []    Dressing [] [] [] [] [] [] [] [x] [] [] To don brief   Feeding [] [] [] [] [] [] [] [] [] [x]    Grooming [] [] [] [] [] [] [] [] [] [x]    Personal Device Care [] [] [] [] [] [] [] [] [] [x]    Functional Mobility [] [] [] [] [x] [x] [] [] [] []    I=Independent, Mod I=Modified Independent, S=Supervision, SBA=Standby Assistance, CGA=Contact Guard Assistance,   Min=Minimal Assistance, Mod=Moderate Assistance, Max=Maximal Assistance, Total=Total Assistance, NT=Not Tested    MOBILITY: I Mod I S SBA CGA Min Mod Max Total  NT x2 Comments:   Supine to sit [] [] [] [] [] [] [] [] [] [x] []    Sit to supine [] [] [] [x] [] [] [] [] [] [] []    Sit to stand [] [] [] [x] [x] [] [] [] [] [] []    Bed to chair [] [] [] [] [] [] [] [] [] [x] []    I=Independent, Mod I=Modified Independent, S=Supervision, SBA=Standby Assistance, CGA=Contact Guard Assistance,   Min=Minimal Assistance, Mod=Moderate Assistance, Max=Maximal Assistance, Total=Total Assistance, NT=Not Tested    BALANCE: Good Fair+ Fair Fair- Poor NT Comments   Sitting Static [] [x] [] [] [] []    Sitting Dynamic [] [x] [] [] [] []              Standing Static [] [] [x] [] [] []    Standing Dynamic [] [] [x] [] [] []      PLAN:   FREQUENCY/DURATION: OT Plan of Care: 3 times/week for duration of hospital stay or until stated goals are met, whichever comes first.    TREATMENT:   TREATMENT:   ($$ Self Care/Home Management: 8-22 mins$$ Therapeutic Activity: 23-37 mins   )  Co-Treatment PT/OT necessary due to patient's decreased overall endurance/tolerance levels, as well as need for high level skilled assistance to complete functional transfers/mobility and functional tasks  Therapeutic Activity (25 Minutes): Therapeutic activity included Supine to Sit, Sit to Supine, Transfer Training, Ambulation on level ground, Sitting balance , Standing balance and exercises to improve functional Mobility, Strength, ROM and Activity tolerance. Self Care (18 Minutes): Self care including Toileting, Lower Body Dressing, Grooming and Energy Conservation Training to increase independence and decrease level of assistance required.     TREATMENT GRID:  N/A    AFTER TREATMENT POSITION/PRECAUTIONS:  Bed, Needs within reach and RN notified    INTERDISCIPLINARY COLLABORATION:  RN/PCT and OT/ROMERO    TOTAL TREATMENT DURATION:  OT Patient Time In/Time Out  Time In: 9105  Time Out: Ciara Salinas [address]

## 2022-02-11 NOTE — PROGRESS NOTES
ACUTE PHYSICAL THERAPY GOALS:  (Developed with and agreed upon by patient and/or caregiver. )  LTG:  (1.)Ms. Fitzgerald will move from supine to sit and sit to supine , scoot up and down and roll side to side in flat bed without siderails with  INDEPENDENT within 7 day(s).    (2.)Ms. Fitzgerald will perform all functional transfers with  MODIFIED INDEPENDENCE using the least restrictive/no device within 7 day(s).    (3.)Ms. Fitzgerald will ambulate with  independently with rolling walker  for 100+ feet with normal vital sign response  within 7 day(s). PHYSICAL THERAPY: Daily Note and AM Treatment Day # 3    Suleiman Kelsey is a 80 y.o. female   PRIMARY DIAGNOSIS: Acute hypoxemic respiratory failure due to COVID-19 [facility])  [facility] hypox[facility] [U07.1, J96.01]         ASSESSMENT:     REHAB RECOMMENDATIONS: CURRENT LEVEL OF FUNCTION:  (Most Recently Demonstrated)   Recommendation to date pending progress:  [address] [place] [address]  Equipment:    To Be Determined Bed Mobility:   Supervision  Sit to Stand:   Standby Assistance  Transfers:   Standby Assistance  Gait/Mobility:   Standby Assistance     ASSESSMENT:  Ms. Harpal Melchor is making good progress toward goals. She understandably has trouble being closed into her room and wants to go home badly but is in a better mood today. She was on [facility]. After having a BM she performed her own hygiene. She stood and was able to take a few steps to the bed. She sat EOB working on breathing and LAQ's and AP's. She returned supine in bed. SUBJECTIVE:   Ms. Harpal Melchor is talkative today and feels better. SOCIAL HISTORY/ LIVING ENVIRONMENT: Ms. Fitzgerald lives with her , son, and DIL. She ambulates independently and is primarily homebound.   Support Systems: Spouse/Significant Other,Child(sky)  OBJECTIVE:     PAIN: VITAL SIGNS: LINES/DRAINS:   Pre Treatment:  0  Post Treatment: No complaints   Continuous Pulse Oximetry  O2 Device: Heated,Hi flow nasal cannula,Humidifier     MOBILITY: I Mod I S SBA CGA Min Mod Max Total  NT x2 Comments:   Bed Mobility    Rolling [] [] [] [] [] [] [] [] [] [x] []    Supine to Sit [] [] [] [] [] [] [] [] [] [x] []    Scooting [] [] [] [] [] [] [] [] [] [x] []    Sit to Supine [] [] [] [x] [] [] [] [] [] [] []    Transfers    Sit to Stand [] [] [] [x] [] [] [] [] [] [] []    Bed to Chair [] [] [] [x] [] [] [] [] [] [] []    Stand to Sit [] [] [] [x] [] [] [] [] [] [] []    I=Independent, Mod I=Modified Independent, S=Supervision, SBA=Standby Assistance, CGA=Contact Guard Assistance,   Min=Minimal Assistance, Mod=Moderate Assistance, Max=Maximal Assistance, Total=Total Assistance, NT=Not Tested    BALANCE: Good Fair+ Fair Fair- Poor NT Comments   Sitting Static [] [x] [] [] [] []    Sitting Dynamic [] [x] [] [] [] []              Standing Static [] [x] [] [] [] []    Standing Dynamic [] [x] [] [] [] []      GAIT: I Mod I S SBA CGA Min Mod Max Total  NT x2 Comments:   Level of Assistance [] [] [] [] [] [] [] [] [] [] []    Distance 4'    DME None HHA    Gait Quality Slow short steps    Weightbearing  Status N/A     I=Independent, Mod I=Modified Independent, S=Supervision, SBA=Standby Assistance, CGA=Contact Guard Assistance,   Min=Minimal Assistance, Mod=Moderate Assistance, Max=Maximal Assistance, Total=Total Assistance, NT=Not Tested    PLAN:   FREQUENCY/DURATION: PT Plan of Care: 3 times/week for duration of hospital stay or until stated goals are met, whichever comes first.  TREATMENT:     TREATMENT:   ($$ Therapeutic Activity: 38-52 mins    )  Therapeutic Activity (43 Minutes): Therapeutic activity included Supine to Sit, Sit to Supine, Sitting balance , Standing balance and transfers to improve functional Mobility, Strength and Activity tolerance.     TREATMENT GRID:  N/A    AFTER TREATMENT POSITION/PRECAUTIONS:  Bed and Needs within reach    INTERDISCIPLINARY COLLABORATION:  PT/PTA    TOTAL TREATMENT DURATION:  PT Patient Time In/Time Out  Time In: 0924  Time Out: Robina Garsia, [place]

## 2022-02-11 NOTE — PROGRESS NOTES
Comprehensive Nutrition Assessment    Type and Reason for Visit: Reassess    Nutrition Recommendations/Plan:   Meals and Snacks:  Continue current diet. Nutrition Supplement Therapy:   Medical food supplement therapy:  Change Ensure Enlive once per day (this provides 350 kcal and 20 grams protein per bottle)     Malnutrition Assessment:  Malnutrition Status: At risk for malnutrition (specify) (predicted inadequate PO intake since admission.)    Nutrition Assessment:   Nutrition History: Nutrition history unable to be obtained d/t isolation precautions. Pt did not answer room phone x 2. No answer from pt's son. Nutrition Background: Pt admitted with covid 19, diarrhea, SOB, increased confusion. PMH notable for HTN, HLD, GERD. Nutrition Interval:  Attempted to call pt x2 (once on room phone and once on cell phone). Pt did not answer. Discussed with RN who reports pt is [place]. Pt is eating at least 75% of meals on average but does receive some meals she doesn't like. Noted preferences in diet order. RN reports pt is occasionally drinking Ensure. Reducing Ensure to 1/day due to improving po intake. Nutrition Related Findings:   NFPE deferred d/t isolation precautions. Current Nutrition Therapies:  ADULT ORAL NUTRITION SUPPLEMENT Breakfast, Dinner, Lunch; Standard High Calorie/High Protein  ADULT DIET Regular; Low Fat/Low Chol/High Fiber/EVA; no mash potatoes,eggs, garcia, carrots and green beans. does like noodles sweet potatoes    Current Intake:   Average Meal Intake: % Average Supplement Intake: Unable to assess      Anthropometric Measures:  Height: 5' 1\" (154.9 cm)  Current Body Wt: 62.2 kg (137 lb 2 oz) (2/8), Weight source: Bed scale  BMI: 25.9, Overweight (BMI 25.0-29. 9)  Admission Body Weight: 146 lb 13.2 oz (1/26, wt source not specified)  Ideal Body Weight (lbs) (Calculated): 105 lbs (48 kg), 139.2 %  Usual Body Wt:  , Percent weight change:            Edema: No data recorded   Estimated Daily Nutrient Needs:  Energy (kcal/day): 0542-9921 (Kcal/kg (20-25), Weight Used: Current (66.3 kg))  Protein (g/day): 66-80 (1-1.2) Weight Used: (Current (66.3 kg))  Fluid (ml/day): 6818-4178 (1 ml/kcal (or per MD))    Nutrition Diagnosis:   · Predicted inadequate energy intake related to  (food choices) as evidenced by  (improving intake; RN reports barrier)    Nutrition Interventions:   Food and/or Nutrient Delivery: Continue current diet,Modify oral nutrition supplement     Coordination of Nutrition Care: Continue to monitor while inpatient  Plan of Care discussed with Hernán Ritchie RN; IDT rounds    Goals:   Previous Goal Met: Progressing toward goal(s)  Active Goal: Meet >75% of estimated needs at time of nutrition follow up. Nutrition Monitoring and Evaluation:      Food/Nutrient Intake Outcomes: Food and nutrient intake,Supplement intake  Physical Signs/Symptoms Outcomes: Biochemical data,Meal time behavior,Weight    Discharge Planning:     Too soon to determine    Electronically signed by Katerine Heaton MS, RDN, LD 2/11/2022 at 2:48 PM  Contact: [phone]      Disaster Mode Active

## 2022-02-12 LAB — MAGNESIUM SERPL-MCNC: 1.5 MG/DL (ref 1.6–2.3)

## 2022-02-12 PROCEDURE — 65660000000 HC RM CCU STEPDOWN

## 2022-02-12 PROCEDURE — 74011000250 HC RX REV CODE- 250: Performed by: STUDENT IN AN ORGANIZED HEALTH CARE EDUCATION/TRAINING PROGRAM

## 2022-02-12 PROCEDURE — 74011250636 HC RX REV CODE- 250/636: Performed by: FAMILY MEDICINE

## 2022-02-12 PROCEDURE — 74011000250 HC RX REV CODE- 250: Performed by: FAMILY MEDICINE

## 2022-02-12 PROCEDURE — 74011250637 HC RX REV CODE- 250/637: Performed by: FAMILY MEDICINE

## 2022-02-12 PROCEDURE — 74011250637 HC RX REV CODE- 250/637: Performed by: STUDENT IN AN ORGANIZED HEALTH CARE EDUCATION/TRAINING PROGRAM

## 2022-02-12 RX ORDER — MAGNESIUM SULFATE HEPTAHYDRATE 40 MG/ML
2 INJECTION, SOLUTION INTRAVENOUS ONCE
Status: COMPLETED | OUTPATIENT
Start: 2022-02-12 | End: 2022-02-13

## 2022-02-12 RX ADMIN — ATORVASTATIN CALCIUM 40 MG: 40 TABLET, FILM COATED ORAL at 21:02

## 2022-02-12 RX ADMIN — TRAMADOL HYDROCHLORIDE 100 MG: 50 TABLET, COATED ORAL at 17:42

## 2022-02-12 RX ADMIN — SODIUM CHLORIDE, PRESERVATIVE FREE 10 ML: 5 INJECTION INTRAVENOUS at 05:45

## 2022-02-12 RX ADMIN — SODIUM CHLORIDE, PRESERVATIVE FREE 10 ML: 5 INJECTION INTRAVENOUS at 21:02

## 2022-02-12 RX ADMIN — SODIUM CHLORIDE, PRESERVATIVE FREE 5 ML: 5 INJECTION INTRAVENOUS at 14:00

## 2022-02-12 RX ADMIN — ESCITALOPRAM OXALATE 10 MG: 10 TABLET ORAL at 17:43

## 2022-02-12 RX ADMIN — TEMAZEPAM 30 MG: 15 CAPSULE ORAL at 21:02

## 2022-02-12 RX ADMIN — GABAPENTIN 800 MG: 400 CAPSULE ORAL at 08:05

## 2022-02-12 RX ADMIN — DEXLANSOPRAZOLE 60 MG: 60 CAPSULE, DELAYED RELEASE ORAL at 09:00

## 2022-02-12 RX ADMIN — APIXABAN 5 MG: 5 TABLET, FILM COATED ORAL at 08:05

## 2022-02-12 RX ADMIN — APIXABAN 5 MG: 5 TABLET, FILM COATED ORAL at 21:02

## 2022-02-12 RX ADMIN — TRAMADOL HYDROCHLORIDE 100 MG: 50 TABLET, COATED ORAL at 08:05

## 2022-02-12 RX ADMIN — GABAPENTIN 800 MG: 400 CAPSULE ORAL at 17:42

## 2022-02-12 RX ADMIN — MIDODRINE HYDROCHLORIDE 5 MG: 5 TABLET ORAL at 17:42

## 2022-02-12 RX ADMIN — MIDODRINE HYDROCHLORIDE 5 MG: 5 TABLET ORAL at 08:05

## 2022-02-12 RX ADMIN — VITAMIN D, TAB 1000IU (100/BT) 2000 UNITS: 25 TAB at 08:05

## 2022-02-12 RX ADMIN — MIDODRINE HYDROCHLORIDE 5 MG: 5 TABLET ORAL at 11:42

## 2022-02-12 RX ADMIN — SODIUM CHLORIDE, PRESERVATIVE FREE 10 ML: 5 INJECTION INTRAVENOUS at 21:05

## 2022-02-12 RX ADMIN — MAGNESIUM SULFATE HEPTAHYDRATE 2 G: 40 INJECTION, SOLUTION INTRAVENOUS at 13:27

## 2022-02-12 NOTE — PROGRESS NOTES
Sitting on side of bed dangling feet watching tv denies any needs at this time monitoring as ordered

## 2022-02-12 NOTE — PROGRESS NOTES
Hospitalist Progress Note   Admit Date:  2022 12:32 PM   Name:  Homer Joseph   Age:  80 y.o. Sex:  female  :  1935   MRN:  [id number]   Room:  [facility]/    Presenting Complaint: Positive For Covid-19    Reason(s) for Admission: Acute hypoxemic respiratory failure due to COVID-19 [facility]) [U07.1, J96.01]     Hospital Course & Interval History:     Art Solorzano a 80 y.o. female with medical history of HTN, mixed HLD, GERD, Glaucoma, Hay fever,  HLD, insomnia who presented from home via EMS with hypoxia and AMS with recent diagnosis of COVID-19. EMS reports patient was hypoxic on arrival, O2 saturation in the 60s.  Placed on 6 L and given terbutaline.  EMS also noted family some concern for patient being more altered than normal.     In ED, T102 BP 91/56    spo2 74% RA, 85% 6L NC, 91% 55L/m  LA 3.9 CRP 17.3 D dimer 3.85 NT pro BNP 3481 HS trop 1544>1609   CXR BL infiltrates   rec'd Tylenol 1000 mg and decadron 10 mg IV    EKG shows sinus tachycardia, rate 115, , QRS 92, QTc 491  S/p actemra on     Seen by cardiology for elevated troponins and new onset afib. Improved with BB and DOAC. Developed E coli UTI s/p course rocephin     Subjective/24hr Events (22): No acute events ON. Weaned off airvo to 5 L HFNC yesterday. maintaining oxygenation on 5L HFNC on my eval this morning with no complaints. Evalee Slight to get home. Now down to 3 L     ROS:  Neg CP, SI, fever, chills   Assessment & Plan:   Acute Hypoxemic Respiratory Failure 2/2 COVID-19 PNA   Improving   - COVID +   - Unvaccinated   - s/p Decadron 6 mg x 10 doses   - s/p Actemra on admission then was started on Baricitinib x 8 doses, last dose /  - Charted O2 Flow Rate (L/min): 3 l/min (Decreased to 3L). Wean as able  Oxygen qualifer in AM     Hypotension - associated with COVID-19. Suspected dysautonomica from covid. Cont midodrine. DC BB. Bradycardia - holding BB. See above. Check mag.      QT prolongation - repeat EKG on 2/10 . Responded to medical therapy. S/p AFIB RVR // Demand ischemia - now lesly. DC BB.  and DOAC. Evaluated by cardiology. 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 Repeat EKG with normal QTc on 2/10. Increased to theraputic dose 10 mg. Improving. insomonia - chronically on restoril at bedtime. Increases risk of dementia, pna and falls     Chronic pain - cont gabapentin       Dispo/Discharge Planning:    Home tomorrow if stable   Diet:  ADULT DIET Regular; Low Fat/Low Chol/High Fiber/EVA; no mash potatoes,eggs, garcia, carrots and green beans.  does like noodles sweet potatoes  ADULT ORAL NUTRITION SUPPLEMENT Breakfast; Standard High Calorie/High Protein  DVT PPx: doac  Code status: Full Code    Hospital Problems as of 2/12/2022 Date Reviewed: 4/12/2018          Codes Class Noted - Resolved POA    * (Principal) [facility] hypoxemic [facility] [place] [address]) ICD-10-CM: U07.1, J96.01  ICD-9-CM: 518.81, 079.89, 799.02  1/26/2022 - Present Yes        [facility]eps[facility]) ICD-10-CM: U07.1, A41.89  ICD-9-CM: 079.89, 995.91  1/26/2022 - Present Yes        Hypotension ICD-10-CM: I95.9  ICD-9-CM: 458.9  2/9/2022 - Present Unknown        UTI (urinary tract infection) ICD-10-CM: N39.0  ICD-9-CM: 599.0  2/3/2022 - Present Unknown        Hypomagnesemia ICD-10-CM: E83.42  ICD-9-CM: 275.2  1/26/2022 - Present Yes        Hypokalemia ICD-10-CM: E87.6  ICD-9-CM: 276.8  1/26/2022 - Present Yes        Prolonged Q-T interval on ECG ICD-10-CM: R94.31  ICD-9-CM: 794.31  1/26/2022 - Present Yes        Elevated troponin ICD-10-CM: R77.8  ICD-9-CM: 790.6  1/26/2022 - Present Yes        Toxic metabolic encephalopathy [facility]-73-LK: G92.8  ICD-9-CM: 349.82  1/26/2022 - Present Yes        Chronic prescription benzodiazepine use ICD-10-CM: Z79.899  ICD-9-CM: V58.69  1/26/2022 - Present Yes        Hypercoagulable state associated with COVID-19 [facility]) ICD-10-CM: U5.3, D68.69  ICD-9-CM: 289.82, 079.89  1/26/2022 - Present Yes        Transaminitis ICD-10-CM: R74.01  ICD-9-CM: 790.4  1/26/2022 - Present Yes        Gastroesophageal reflux disease without esophagitis ICD-10-CM: K21.9  ICD-9-CM: 530.81  9/28/2017 - Present Yes    Overview Signed 1/9/2018 10:12 AM by Sohan Doan DO     Last Assessment & Plan:   Stable. Essential (primary) hypertension ICD-10-CM: I10  ICD-9-CM: 401.9  10/28/2016 - Present Yes    Overview Signed 1/9/2018 10:12 AM by Sohan Doan DO     Last Assessment & Plan:   Stable. Hyperlipidemia ICD-10-CM: E78.5  ICD-9-CM: 272.4  10/28/2016 - Present Yes    Overview Signed 1/9/2018 10:12 AM by Sohan Doan DO     Last Assessment & Plan:   Return for fasting labs. Insomnia, persistent ICD-10-CM: G47.00  ICD-9-CM: 307.42  4/27/2016 - Present Yes    Overview Signed 1/9/2018 10:12 AM by Sohan Doan DO     Last Assessment & Plan:   Restoril prescription printed & given. Primary osteoarthritis involving multiple joints ICD-10-CM: M89.49  ICD-9-CM: 715.98  7/31/2015 - Present Yes    Overview Signed 1/9/2018 10:12 AM by Sohan Doan DO     Last Assessment & Plan:   3 Norco scripts printed with do not fill before dates of 10/10, 11/9, 12/9. Tramadol prescription printed & given (decreased from 150 to 120 tablets per month based on how patient is taking it). Aware of risk of sedation and aware to get prescriptions only in our office. Discussed polypharmacy & fall risk with patient's multiple sedating medications, encouraged to limit use as much as possible. Will have her see Dr. Genevieve Layne for next follow up to review pain medication regimen. Pt verbalized understanding, agreed with plan.                    Objective:     Patient Vitals for the past 24 hrs:   Temp Pulse Resp BP SpO2   02/12/22 1140 97.9 °F (36.6 °C) 64 18 (!) 98/59 96 %   02/12/22 1117 -- -- -- -- 98 %   02/12/22 0754 97.9 °F (36.6 °C) (!) 54 18 103/64 98 %   02/12/22 0400 98.3 °F (36.8 °C) (!) 56 18 112/69 98 %   02/11/22 2312 97.1 °F (36.2 °C) 61 18 (!) 95/59 97 %   02/11/22 1933 98.1 °F (36.7 °C) 60 18 121/88 96 %   02/11/22 1720 -- -- -- -- 97 %     Oxygen Therapy  O2 Sat (%): 96 % (02/12/22 1140)  Pulse via Oximetry: 62 beats per minute (02/12/22 1117)  O2 Device: Hi flow nasal cannula (02/12/22 1117)  Skin Assessment: Clean, dry, & intact (02/10/22 0930)  Skin Protection for O2 Device: No (02/10/22 0930)  O2 Flow Rate (L/min): 3 l/min (Decreased to 3L) (02/12/22 1117)  O2 Temperature: 87.8 °F (31 °C) (02/10/22 0930)  FIO2 (%): 45 % (02/11/22 1137)    Estimated body mass index is 25.92 kg/m² as calculated from the following:    Height as of this encounter: 5' 1\" (1.549 m). Weight as of this encounter: 62.2 kg (137 lb 3.2 oz). Intake/Output Summary (Last 24 hours) at 2/12/2022 1631  Last data filed at 2/12/2022 1400  Gross per 24 hour   Intake 680 ml   Output --   Net 680 ml         Physical Exam:     Blood pressure (!) 98/59, pulse 64, temperature 97.9 °F (36.6 °C), resp. rate 18, height 5' 1\" (1.549 m), weight 62.2 kg (137 lb 3.2 oz), SpO2 96 %. Physical Exam  Vitals and nursing note reviewed. Constitutional:       General: She is not in acute distress. HENT:      Mouth/Throat:      Mouth: Mucous membranes are moist.   Cardiovascular:      Rate and Rhythm: Regular rhythm. Bradycardia present. Pulmonary:      Effort: Pulmonary effort is normal.   Neurological:      Mental Status: She is alert and oriented to person, place, and time.    Psychiatric:         Mood and Affect: Mood normal.         I have reviewed ordered lab tests and independently visualized imaging below:    Recent Labs:  Recent Results (from the past 48 hour(s))   EKG, 12 LEAD, SUBSEQUENT    Collection Time: 02/10/22  8:29 PM   Result Value Ref Range    Ventricular Rate 56 BPM    Atrial Rate 56 BPM    P-R Interval 144 ms    QRS Duration 92 ms    Q-T Interval 408 ms    QTC Calculation (Bezet) 393 ms    Calculated P Axis 41 degrees    Calculated R Axis -10 degrees    Calculated T Axis 10 degrees    Diagnosis       Sinus bradycardia  Minimal voltage criteria for LVH, may be normal variant  Nonspecific T wave abnormality  Abnormal ECG  When compared with ECG of 29-JAN-2022 13:38,  Sinus rhythm has replaced Atrial fibrillation  Vent. rate has decreased  BPM  ST no longer depressed in Anterolateral leads  T wave inversion less evident in Inferior leads  T wave inversion no longer evident in Lateral leads  Confirmed by [facility]  MD (), Vinicius LOVE ([id number]) on 2/11/2022 6:23:13 AM     MAGNESIUM    Collection Time: 02/10/22  8:36 PM   Result Value Ref Range    Magnesium 1.5 (L) 1.6 - 2.3 mg/dL   PROCALCITONIN    Collection Time: 02/11/22  4:56 AM   Result Value Ref Range    Procalcitonin <0.05 0.00 - 0.49 ng/mL   D DIMER    Collection Time: 02/11/22  4:56 AM   Result Value Ref Range    D DIMER 1.20 (H) <0.56 ug/ml(FEU)   CBC WITH AUTOMATED DIFF    Collection Time: 02/11/22  4:56 AM   Result Value Ref Range    WBC 5.1 4.3 - 11.1 K/uL    RBC 3.57 (L) 4.05 - 5.2 M/uL    HGB 10.3 (L) 11.7 - 15.4 g/dL    HCT 33.1 (L) 35.8 - 46.3 %    MCV 92.7 79.6 - 97.8 FL    MCH 28.9 26.1 - 32.9 PG    MCHC 31.1 (L) 31.4 - 35.0 g/dL    RDW 14.0 11.9 - 14.6 %    PLATELET 552 379 - 449 K/uL    MPV 11.1 9.4 - 12.3 FL    ABSOLUTE NRBC 0.00 0.0 - 0.2 K/uL    DF AUTOMATED      NEUTROPHILS 56 43 - 78 %    LYMPHOCYTES 28 13 - 44 %    MONOCYTES 14 (H) 4.0 - 12.0 %    EOSINOPHILS 2 0.5 - 7.8 %    BASOPHILS 1 0.0 - 2.0 %    IMMATURE GRANULOCYTES 0 0.0 - 5.0 %    ABS. NEUTROPHILS 2.9 1.7 - 8.2 K/UL    ABS. LYMPHOCYTES 1.4 0.5 - 4.6 K/UL    ABS. MONOCYTES 0.7 0.1 - 1.3 K/UL    ABS. EOSINOPHILS 0.1 0.0 - 0.8 K/UL    ABS. BASOPHILS 0.0 0.0 - 0.2 K/UL    ABS. IMM.  GRANS. 0.0 0.0 - 0.5 K/UL   NT-PRO BNP    Collection Time: 02/11/22  4:56 AM   Result Value Ref Range    NT pro- <450 PG/ML       All Micro Results     Procedure Component Value Units Date/Time    CULTURE, URINE [[id number]]  (Abnormal)  (Susceptibility) Collected: 01/28/22 1105    Order Status: Completed Specimen: Urine from Clean catch Updated: 01/30/22 0829     Special Requests: NO SPECIAL REQUESTS        Culture result:       >100,000 COLONIES/mL ESCHERICHIA COLI                  <1,000 CFU/ML MIXED SKIN SCOTT ISOLATED          COVID-19 RAPID TEST [[id number]]  (Abnormal) Collected: 01/26/22 1258    Order Status: Completed Specimen: Nasopharyngeal Updated: 01/26/22 1316     Specimen source NASAL        COVID-19 rapid test Detected        Comment:      The specimen is POSITIVE for SARS-CoV-2, the novel coronavirus associated with COVID-19. This test has been authorized by the FDA under an Emergency Use Authorization (EUA) for use by authorized laboratories. Fact sheet for Healthcare Providers: [employer]co[employer]nz  Fact sheet for Patients: [employer].nz       Methodology: Isothermal Nucleic Acid Amplification               Other Studies:  No results found.     Current Meds:  Current Facility-Administered Medications   Medication Dose Route Frequency    escitalopram oxalate (LEXAPRO) tablet 10 mg  10 mg Oral QPM    metoprolol tartrate (LOPRESSOR) tablet 12.5 mg  12.5 mg Oral Q12H    temazepam (RESTORIL) capsule 30 mg  30 mg Oral QHS    midodrine (PROAMATINE) tablet 5 mg  5 mg Oral TID WITH MEALS    lidocaine 4 % patch 1 Patch  1 Patch TransDERmal Q24H    gabapentin (NEURONTIN) capsule 800 mg  800 mg Oral BID    lip protectant (BLISTEX) ointment 1 Each  1 Each Topical PRN    apixaban (ELIQUIS) tablet 5 mg  5 mg Oral BID    traMADoL (ULTRAM) tablet 100 mg  100 mg Oral Q8H PRN    dexlansoprazole (DEXILANT) capsule (delayed release) CpDB 60 mg (Patient Supplied)  60 mg Oral DAILY    sodium chloride (NS) flush 5-10 mL  5-10 mL IntraVENous Q8H    sodium chloride (NS) flush 5-10 mL  5-10 mL IntraVENous PRN    sodium chloride (NS) flush 5-40 mL  5-40 mL IntraVENous Q8H    sodium chloride (NS) flush 5-40 mL  5-40 mL IntraVENous PRN    acetaminophen (TYLENOL) tablet 650 mg  650 mg Oral Q6H PRN    Or    acetaminophen (TYLENOL) suppository 650 mg  650 mg Rectal Q6H PRN    polyethylene glycol (MIRALAX) packet 17 g  17 g Oral DAILY PRN    ondansetron (ZOFRAN ODT) tablet 4 mg  4 mg Oral Q8H PRN    Or    ondansetron (ZOFRAN) injection 4 mg  4 mg IntraVENous Q6H PRN    guaiFENesin-dextromethorphan (ROBITUSSIN DM) 100-10 mg/5 mL syrup 5 mL  5 mL Oral Q4H PRN    cholecalciferol (VITAMIN D3) (1000 Units /25 mcg) tablet 2,000 Units  2,000 Units Oral DAILY    alum-mag hydroxide-simeth (MYLANTA) oral suspension 15 mL  15 mL Oral Q6H PRN    albuterol (PROVENTIL HFA, VENTOLIN HFA, PROAIR HFA) inhaler 2 Puff  2 Puff Inhalation Q4H PRN    atorvastatin (LIPITOR) tablet 40 mg  40 mg Oral QHS       Signed:  Avis Schwab, DO    Part of this note may have been written by using a voice dictation software. The note has been proof read but may still contain some grammatical/other typographical errors.

## 2022-02-13 PROCEDURE — 74011250637 HC RX REV CODE- 250/637: Performed by: FAMILY MEDICINE

## 2022-02-13 PROCEDURE — 74011250637 HC RX REV CODE- 250/637: Performed by: STUDENT IN AN ORGANIZED HEALTH CARE EDUCATION/TRAINING PROGRAM

## 2022-02-13 PROCEDURE — 65660000000 HC RM CCU STEPDOWN

## 2022-02-13 PROCEDURE — 74011000250 HC RX REV CODE- 250: Performed by: FAMILY MEDICINE

## 2022-02-13 PROCEDURE — 74011000250 HC RX REV CODE- 250: Performed by: STUDENT IN AN ORGANIZED HEALTH CARE EDUCATION/TRAINING PROGRAM

## 2022-02-13 PROCEDURE — 74011250636 HC RX REV CODE- 250/636: Performed by: FAMILY MEDICINE

## 2022-02-13 RX ORDER — MAGNESIUM SULFATE HEPTAHYDRATE 40 MG/ML
2 INJECTION, SOLUTION INTRAVENOUS ONCE
Status: COMPLETED | OUTPATIENT
Start: 2022-02-13 | End: 2022-02-13

## 2022-02-13 RX ADMIN — GABAPENTIN 800 MG: 400 CAPSULE ORAL at 17:02

## 2022-02-13 RX ADMIN — SODIUM CHLORIDE, PRESERVATIVE FREE 10 ML: 5 INJECTION INTRAVENOUS at 05:32

## 2022-02-13 RX ADMIN — SODIUM CHLORIDE, PRESERVATIVE FREE 10 ML: 5 INJECTION INTRAVENOUS at 22:00

## 2022-02-13 RX ADMIN — TRAMADOL HYDROCHLORIDE 100 MG: 50 TABLET, COATED ORAL at 08:04

## 2022-02-13 RX ADMIN — SODIUM CHLORIDE, PRESERVATIVE FREE 10 ML: 5 INJECTION INTRAVENOUS at 21:32

## 2022-02-13 RX ADMIN — ATORVASTATIN CALCIUM 40 MG: 40 TABLET, FILM COATED ORAL at 21:32

## 2022-02-13 RX ADMIN — MAGNESIUM SULFATE HEPTAHYDRATE 2 G: 40 INJECTION, SOLUTION INTRAVENOUS at 09:40

## 2022-02-13 RX ADMIN — MIDODRINE HYDROCHLORIDE 5 MG: 5 TABLET ORAL at 11:54

## 2022-02-13 RX ADMIN — MIDODRINE HYDROCHLORIDE 5 MG: 5 TABLET ORAL at 17:02

## 2022-02-13 RX ADMIN — SODIUM CHLORIDE, PRESERVATIVE FREE 5 ML: 5 INJECTION INTRAVENOUS at 14:00

## 2022-02-13 RX ADMIN — VITAMIN D, TAB 1000IU (100/BT) 2000 UNITS: 25 TAB at 08:04

## 2022-02-13 RX ADMIN — TEMAZEPAM 30 MG: 15 CAPSULE ORAL at 21:32

## 2022-02-13 RX ADMIN — ESCITALOPRAM OXALATE 10 MG: 10 TABLET ORAL at 17:02

## 2022-02-13 RX ADMIN — GABAPENTIN 800 MG: 400 CAPSULE ORAL at 08:04

## 2022-02-13 RX ADMIN — APIXABAN 5 MG: 5 TABLET, FILM COATED ORAL at 08:04

## 2022-02-13 RX ADMIN — APIXABAN 5 MG: 5 TABLET, FILM COATED ORAL at 21:32

## 2022-02-13 RX ADMIN — DEXLANSOPRAZOLE 60 MG: 60 CAPSULE, DELAYED RELEASE ORAL at 09:00

## 2022-02-13 RX ADMIN — MIDODRINE HYDROCHLORIDE 5 MG: 5 TABLET ORAL at 08:04

## 2022-02-13 NOTE — PROGRESS NOTES
Hospitalist Progress Note   Admit Date:  2022 12:32 PM   Name:  Sebastian Burdickr   Age:  80 y.o. Sex:  female  :  1935   MRN:  [id number]   Room:  5/    Presenting Complaint: Positive For Covid-19    Reason(s) for Admission: Acute hypoxemic respiratory failure due to COVID-19 [facility]) [U07.1, J96.01]     Hospital Course & Interval History:     Dali Ching a 80 y.o. female with medical history of HTN, mixed HLD, GERD, Glaucoma, Hay fever,  HLD, insomnia who presented from home via EMS with hypoxia and AMS with recent diagnosis of COVID-19. EMS reports patient was hypoxic on arrival, O2 saturation in the 60s.  Placed on 6 L and given terbutaline.  EMS also noted family some concern for patient being more altered than normal.     In ED, T102 BP 91/56    spo2 74% RA, 85% 6L NC, 91% 55L/m  LA 3.9 CRP 17.3 D dimer 3.85 NT pro BNP 3481 HS trop 1544>1609   CXR BL infiltrates   rec'd Tylenol 1000 mg and decadron 10 mg IV    EKG shows sinus tachycardia, rate 115, , QRS 92, QTc 491  S/p actemra on     Seen by cardiology for elevated troponins and new onset afib. Improved with BB and DOAC. Developed E coli UTI s/p course rocephin     Subjective/24hr Events (22): Overnight she fell transferring from bed to bedside commode and landed on her buttocks with no LOC or head trauma per patient report, unwitnessed fall, was too weak to lift self off the floor and remained on floor for ~2 hours prior to being found down. She feels fine other than her pride is her bc she couldn't get herself up and her buttocks is sore. She is earger to go home. Required 5 L o2 to ambulate with walker with RT.   ROS:  Neg CP, SI, fever, chills, LOC, head trauma, new focal deficits,    Assessment & Plan:   Acute Hypoxemic Respiratory Failure 2/2 COVID-19 PNA   Improving ; not stable for discharge home. Cont current plan of care and repeat walking test tomorrow.  Repeat CBC, BMP in AM   - COVID +   - Unvaccinated   - s/p Decadron 6 mg x 10 doses   - s/p Actemra on admission then was started on Baricitinib x 8 doses, last dose 2/8  - Charted O2 Flow Rate (L/min): 3 l/min. Wean as able    S/p unwitnessed fall // physical debility - deconditioned 2/2 prolonged hospitalization and hypoxic respiratory failure. Plans for [facility] at OR. No LOC or head trauma. hypomagnsemia - s/p 2 g IV yesterday, repeat today. Hypotension - associated with COVID-19. Suspected dysautonomica from covid. Stable however remains on midodrine. Discontinued BB. Check cortisol, TSH in AM. Obtain orthostatic vitals daily     Bradycardia - holding BB. See above. QT prolongation - repeat EKG on 2/10 . Responded to medical therapy. S/p AFIB RVR // Demand ischemia - now lesly. [address] BB. and DOAC. Evaluated by cardiology who signed off. 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 Repeat EKG with normal QTc on 2/10. Increased to theraputic dose 10 mg. Improving. insomonia - chronically on restoril at bedtime. Increases risk of dementia, pna and falls     Chronic pain - cont gabapentin       Dispo/Discharge Planning:    Home tomorrow if stable   Diet:  ADULT DIET Regular; Low Fat/Low Chol/High Fiber/EVA; no mash potatoes,eggs, garcia, carrots and green beans.  does like noodles sweet potatoes  ADULT ORAL NUTRITION SUPPLEMENT Breakfast; Standard High Calorie/High Protein  DVT PPx: doac  Code status: Full Code    Hospital Problems as of 2/13/2022 Date Reviewed: 4/12/2018          Codes Class Noted - Resolved POA    * (Principal) Acute hypoxemic respiratory failure due to COVID-19 [facility]) ICD-10-CM: U07.1, J96.01  ICD-9-CM: 518.81, 079.89, 799.02  1/26/2022 - Present Yes        Sepsis due to COVID-19 [facility]) ICD-10-CM: U07.1, A41.89  ICD-9-CM: 079.89, 995.91  1/26/2022 - Present Yes        Hypotension ICD-10-CM: I95.9  ICD-9-CM: 458.9  2/9/2022 - Present Unknown        UTI (urinary tract infection) ICD-10-CM: N39.0  ICD-9-CM: 599.0  2/3/2022 - Present Unknown        Hypomagnesemia ICD-10-CM: E83.42  ICD-9-CM: 275.2  1/26/2022 - Present Yes        Hypokalemia ICD-10-CM: E87.6  ICD-9-CM: 276.8  1/26/2022 - Present Yes        Prolonged Q-T interval on ECG ICD-10-CM: R94.31  ICD-9-CM: 794.31  1/26/2022 - Present Yes        Elevated troponin ICD-10-CM: R77.8  ICD-9-CM: 790.6  1/26/2022 - Present Yes        Toxic metabolic encephalopathy RQK-15-OK: G92.8  ICD-9-CM: 349.82  1/26/2022 - Present Yes        Chronic prescription benzodiazepine use ICD-10-CM: Z79.899  ICD-9-CM: V58.69  1/26/2022 - Present Yes        Hypercoagulable state associated with COVID-19 [facility]) ICD-10-CM: U07.1, D68.69  ICD-9-CM: 289.82, 079.89  1/26/2022 - Present Yes        Transaminitis ICD-10-CM: R74.01  ICD-9-CM: 790.4  1/26/2022 - Present Yes        Gastroesophageal reflux disease without esophagitis ICD-10-CM: K21.9  ICD-9-CM: 530.81  9/28/2017 - Present Yes    Overview Signed 1/9/2018 10:12 AM by Aurora Rivas DO     Last Assessment & Plan:   Stable. Essential (primary) hypertension ICD-10-CM: I10  ICD-9-CM: 401.9  10/28/2016 - Present Yes    Overview Signed 1/9/2018 10:12 AM by Aurora Rivas DO     Last Assessment & Plan:   Stable. Hyperlipidemia ICD-10-CM: E78.5  ICD-9-CM: 272.4  10/28/2016 - Present Yes    Overview Signed 1/9/2018 10:12 AM by Aurora Rivas DO     Last Assessment & Plan:   Return for fasting labs. Insomnia, persistent ICD-10-CM: G47.00  ICD-9-CM: 307.42  4/27/2016 - Present Yes    Overview Signed 1/9/2018 10:12 AM by Aurora Rivas DO     Last Assessment & Plan:   Restoril prescription printed & given.              Primary osteoarthritis involving multiple joints ICD-10-CM: M89.49  ICD-9-CM: 715.98  7/31/2015 - Present Yes    Overview Signed 1/9/2018 10:12 AM by Aurora Rivas DO     Last Assessment & Plan:   3 Norco scripts printed with do not fill before dates of 10/10, 11/9, 12/9. Tramadol prescription printed & given (decreased from 150 to 120 tablets per month based on how patient is taking it). Aware of risk of sedation and aware to get prescriptions only in our office. Discussed polypharmacy & fall risk with patient's multiple sedating medications, encouraged to limit use as much as possible. Will have her see Dr. Devora Moran for next follow up to review pain medication regimen. Pt verbalized understanding, agreed with plan. Objective:     Patient Vitals for the past 24 hrs:   Temp Pulse Resp BP SpO2   02/13/22 0809 98.2 °F (36.8 °C) 65 18 110/67 97 %   02/13/22 0304 97.2 °F (36.2 °C) 65 22 (!) 110/59 99 %   02/13/22 0133 97.5 °F (36.4 °C) (!) 58 20 118/61 98 %   02/12/22 2251 97.8 °F (36.6 °C) 60 20 (!) 109/56 97 %   02/12/22 1940 98.6 °F (37 °C) 68 20 118/67 99 %   02/12/22 1739 98 °F (36.7 °C) (!) 56 18 106/60 98 %     Oxygen Therapy  O2 Sat (%): 97 % (02/13/22 0809)  Pulse via Oximetry: 58 beats per minute (02/13/22 0304)  O2 Device: Heated; Hi flow nasal cannula (02/13/22 0304)  Skin Assessment: Clean, dry, & intact (02/10/22 0930)  Skin Protection for O2 Device: No (02/10/22 0930)  O2 Flow Rate (L/min): 3 l/min (02/13/22 0304)  O2 Temperature: 87.8 °F (31 °C) (02/10/22 0930)  FIO2 (%): 45 % (02/11/22 1137)    Estimated body mass index is 25.92 kg/m² as calculated from the following:    Height as of this encounter: 5' 1\" (1.549 m). Weight as of this encounter: 62.2 kg (137 lb 3.2 oz). Intake/Output Summary (Last 24 hours) at 2/13/2022 1244  Last data filed at 2/13/2022 1014  Gross per 24 hour   Intake 640 ml   Output 400 ml   Net 240 ml         Physical Exam:     Blood pressure 110/67, pulse 65, temperature 98.2 °F (36.8 °C), resp. rate 18, height 5' 1\" (1.549 m), weight 62.2 kg (137 lb 3.2 oz), SpO2 97 %. Physical Exam  Vitals and nursing note reviewed.    Constitutional:       General: She is not in acute distress. HENT:      Mouth/Throat:      Mouth: Mucous membranes are moist.   Cardiovascular:      Rate and Rhythm: Regular rhythm. Bradycardia present. Pulmonary:      Effort: Pulmonary effort is normal.   Neurological:      Mental Status: She is alert and oriented to person, place, and time. Psychiatric:         Mood and Affect: Mood normal.         I have reviewed ordered lab tests and independently visualized imaging below:    Recent Labs:  No results found for this or any previous visit (from the past 48 hour(s)). All Micro Results     Procedure Component Value Units Date/Time    CULTURE, URINE [[id number]]  (Abnormal)  (Susceptibility) Collected: 01/28/22 1105    Order Status: Completed Specimen: Urine from Clean catch Updated: 01/30/22 0806     Special Requests: NO SPECIAL REQUESTS        Culture result:       >100,000 COLONIES/mL ESCHERICHIA COLI                  <1,000 CFU/ML MIXED SKIN SCOTT ISOLATED          COVID-19 RAPID TEST [[id number]]  (Abnormal) Collected: 01/26/22 1258    Order Status: Completed Specimen: Nasopharyngeal Updated: 01/26/22 1316     Specimen source NASAL        COVID-19 rapid test Detected        Comment:      The specimen is POSITIVE for SARS-CoV-2, the novel coronavirus associated with COVID-19. This test has been authorized by the FDA under an Emergency Use Authorization (EUA) for use by authorized laboratories. Fact sheet for Healthcare Providers: ConventionUpdate.co.nz  Fact sheet for Patients: ConventionUpdate.co.nz       Methodology: Isothermal Nucleic Acid Amplification               Other Studies:  No results found.     Current Meds:  Current Facility-Administered Medications   Medication Dose Route Frequency    escitalopram oxalate (LEXAPRO) tablet 10 mg  10 mg Oral QPM    temazepam (RESTORIL) capsule 30 mg  30 mg Oral QHS    midodrine (PROAMATINE) tablet 5 mg  5 mg Oral TID WITH MEALS    lidocaine 4 % patch 1 Patch 1 Patch TransDERmal Q24H    gabapentin (NEURONTIN) capsule 800 mg  800 mg Oral BID    lip protectant (BLISTEX) ointment 1 Each  1 Each Topical PRN    apixaban (ELIQUIS) tablet 5 mg  5 mg Oral BID    traMADoL (ULTRAM) tablet 100 mg  100 mg Oral Q8H PRN    dexlansoprazole (DEXILANT) capsule (delayed release) CpDB 60 mg (Patient Supplied)  60 mg Oral DAILY    sodium chloride (NS) flush 5-10 mL  5-10 mL IntraVENous Q8H    sodium chloride (NS) flush 5-10 mL  5-10 mL IntraVENous PRN    sodium chloride (NS) flush 5-40 mL  5-40 mL IntraVENous Q8H    sodium chloride (NS) flush 5-40 mL  5-40 mL IntraVENous PRN    acetaminophen (TYLENOL) tablet 650 mg  650 mg Oral Q6H PRN    Or    acetaminophen (TYLENOL) suppository 650 mg  650 mg Rectal Q6H PRN    polyethylene glycol (MIRALAX) packet 17 g  17 g Oral DAILY PRN    ondansetron (ZOFRAN ODT) tablet 4 mg  4 mg Oral Q8H PRN    Or    ondansetron (ZOFRAN) injection 4 mg  4 mg IntraVENous Q6H PRN    guaiFENesin-dextromethorphan (ROBITUSSIN DM) 100-10 mg/5 mL syrup 5 mL  5 mL Oral Q4H PRN    cholecalciferol (VITAMIN D3) (1000 Units /25 mcg) tablet 2,000 Units  2,000 Units Oral DAILY    alum-mag hydroxide-simeth (MYLANTA) oral suspension 15 mL  15 mL Oral Q6H PRN    albuterol (PROVENTIL HFA, VENTOLIN HFA, PROAIR HFA) inhaler 2 Puff  2 Puff Inhalation Q4H PRN    atorvastatin (LIPITOR) tablet 40 mg  40 mg Oral QHS       Signed:  Addy Jensen DO    Part of this note may have been written by using a voice dictation software. The note has been proof read but may still contain some grammatical/other typographical errors.

## 2022-02-13 NOTE — PROGRESS NOTES
Patient's ability to ambulate safely on 5L/m O2 with walker is questionable given her immediate response to the need to go to the bathroom made her appear somewhat unstable.

## 2022-02-13 NOTE — PROGRESS NOTES
Post Fall Documentation    Dg Hahn witnessed/unwitnessed fall occurred on 2/13/2022 at 0125. The answers to the following questions summarize the fall: In the patient's own words:  · What were you attempting to do when you fell? \"going back to bed from bedside commode\"  · Do you know why you fell? \"I just didn't have any strength\"  · Do you have any pain/discomfort or any other complaints? \"no\"  · Which part of your body made contact with the floor or other object? \"my bottom\"  Nurse:  Nayana Kumar Was this an assisted fall? no   Was fall witnessed? No   If witnessed, what part of the body made contact with the floor or other object? N/A   Patients mental status after the fall/when found: Alert and oriented   Any apparent injury:  No apparent injury   Immediate interventions for injury/suspected injury? No interventions needed   Patient assisted back to bed? Assist X1   Name of provider notified and time, any comments? Dr. Joe Reynolds notified via [employer] at [address] Name of family member notified and time: Patient's son, Kaia Conway ([phone]) was called at [phone] but did not answer. Voicemail for return call was left at [phone]. Document Immediate VS and physical assessment in flowsheets. Document Neuro assessment every hour x 4 (for potential head injury or unwitnessed fall) in flowsheets.         Maddy Tompkins RN

## 2022-02-13 NOTE — PROGRESS NOTES
02/13/22 1150   Resting (Room Air)   SpO2 83   HR 73   Resting (On O2)   SpO2 99   HR 66   O2 Device Nasal cannula   O2 Flow Rate (l/min) 3 l/min   During Walk (Room Air)   SpO2 83   HR 73   Walk/Assistance Device Ambulation   Rate of Dyspnea 0   During Walk (On O2)   SpO2 85   HR 76   O2 Device Nasal cannula   O2 Flow Rate (l/min) 1 l/min   Need Additional O2 Flow Rate Rows Yes   O2 Flow Rate (l/min) 2 l/min   O2 Saturation 88   O2 Flow Rate (l/min) 3 l/min   O2 Saturation 86   O2 Flow Rate (l/min) 4 l/min   O2 Saturation 88   O2 Flow Rate (l/min) 5 l/min   O2 Saturation 91   Walk/Assistance Device Ambulation   Rate of Dyspnea 0   After Walk   SpO2 92   HR 84   O2 Device Nasal cannula   O2 Flow Rate (l/min) 5 l/min   Rate of Dyspnea 0   Does the Patient Qualify for Home O2 Yes   Liter Flow at Rest 3   Liter Flow on Exertion 5

## 2022-02-13 NOTE — PROGRESS NOTES
Patient has been observed during hourly rounding and is currently asleep in her bed. She will awaken easily and denies needs to this writer. She continues on heated high flow oxygen and has to be reminded at times to take deep breaths. She follows commands well. She will continue to be assisted as needed and will prepare to give report to oncoming nurse.

## 2022-02-14 PROBLEM — E27.49 SECONDARY ADRENAL INSUFFICIENCY (HCC): Status: ACTIVE | Noted: 2022-02-14

## 2022-02-14 PROBLEM — B96.20 E-COLI UTI: Status: ACTIVE | Noted: 2022-02-03

## 2022-02-14 PROBLEM — I48.91 ATRIAL FIBRILLATION WITH RVR (HCC): Status: ACTIVE | Noted: 2022-02-14

## 2022-02-14 PROBLEM — G93.41 ACUTE METABOLIC ENCEPHALOPATHY: Status: ACTIVE | Noted: 2022-01-26

## 2022-02-14 LAB
ANION GAP SERPL CALC-SCNC: 0 MMOL/L (ref 7–16)
BUN SERPL-MCNC: 19 MG/DL (ref 8–23)
CALCIUM SERPL-MCNC: 8.9 MG/DL (ref 8.3–10.4)
CHLORIDE SERPL-SCNC: 105 MMOL/L (ref 98–107)
CO2 SERPL-SCNC: 36 MMOL/L (ref 21–32)
CORTIS AM PEAK SERPL-MCNC: 2 UG/DL (ref 7–25)
CREAT SERPL-MCNC: 0.7 MG/DL (ref 0.6–1)
ERYTHROCYTE [DISTWIDTH] IN BLOOD BY AUTOMATED COUNT: 14.4 % (ref 11.9–14.6)
GLUCOSE SERPL-MCNC: 104 MG/DL (ref 65–100)
HCT VFR BLD AUTO: 31.9 % (ref 35.8–46.3)
HGB BLD-MCNC: 10 G/DL (ref 11.7–15.4)
MCH RBC QN AUTO: 29.3 PG (ref 26.1–32.9)
MCHC RBC AUTO-ENTMCNC: 31.3 G/DL (ref 31.4–35)
MCV RBC AUTO: 93.5 FL (ref 79.6–97.8)
NRBC # BLD: 0 K/UL (ref 0–0.2)
PLATELET # BLD AUTO: 196 K/UL (ref 150–450)
PMV BLD AUTO: 11.1 FL (ref 9.4–12.3)
POTASSIUM SERPL-SCNC: 4.4 MMOL/L (ref 3.5–5.1)
RBC # BLD AUTO: 3.41 M/UL (ref 4.05–5.2)
SODIUM SERPL-SCNC: 141 MMOL/L (ref 136–145)
TSH SERPL DL<=0.005 MIU/L-ACNC: 1.48 UIU/ML (ref 0.36–3.74)
WBC # BLD AUTO: 3.6 K/UL (ref 4.3–11.1)

## 2022-02-14 PROCEDURE — 36415 COLL VENOUS BLD VENIPUNCTURE: CPT

## 2022-02-14 PROCEDURE — 84443 ASSAY THYROID STIM HORMONE: CPT

## 2022-02-14 PROCEDURE — 74011000250 HC RX REV CODE- 250: Performed by: FAMILY MEDICINE

## 2022-02-14 PROCEDURE — 74011250637 HC RX REV CODE- 250/637: Performed by: STUDENT IN AN ORGANIZED HEALTH CARE EDUCATION/TRAINING PROGRAM

## 2022-02-14 PROCEDURE — 82533 TOTAL CORTISOL: CPT

## 2022-02-14 PROCEDURE — 80048 BASIC METABOLIC PNL TOTAL CA: CPT

## 2022-02-14 PROCEDURE — 97535 SELF CARE MNGMENT TRAINING: CPT

## 2022-02-14 PROCEDURE — 97530 THERAPEUTIC ACTIVITIES: CPT

## 2022-02-14 PROCEDURE — 74011000250 HC RX REV CODE- 250: Performed by: STUDENT IN AN ORGANIZED HEALTH CARE EDUCATION/TRAINING PROGRAM

## 2022-02-14 PROCEDURE — 85027 COMPLETE CBC AUTOMATED: CPT

## 2022-02-14 PROCEDURE — 74011250637 HC RX REV CODE- 250/637: Performed by: FAMILY MEDICINE

## 2022-02-14 PROCEDURE — 83735 ASSAY OF MAGNESIUM: CPT

## 2022-02-14 PROCEDURE — 65660000000 HC RM CCU STEPDOWN

## 2022-02-14 RX ORDER — MIDODRINE HYDROCHLORIDE 5 MG/1
2.5 TABLET ORAL
Status: DISCONTINUED | OUTPATIENT
Start: 2022-02-15 | End: 2022-02-16 | Stop reason: HOSPADM

## 2022-02-14 RX ORDER — HYDROCORTISONE 5 MG/1
5 TABLET ORAL
Status: DISCONTINUED | OUTPATIENT
Start: 2022-02-14 | End: 2022-02-16 | Stop reason: HOSPADM

## 2022-02-14 RX ORDER — HYDROCORTISONE 5 MG/1
10 TABLET ORAL
Status: DISCONTINUED | OUTPATIENT
Start: 2022-02-14 | End: 2022-02-16 | Stop reason: HOSPADM

## 2022-02-14 RX ADMIN — SODIUM CHLORIDE, PRESERVATIVE FREE 10 ML: 5 INJECTION INTRAVENOUS at 20:45

## 2022-02-14 RX ADMIN — TEMAZEPAM 30 MG: 15 CAPSULE ORAL at 20:46

## 2022-02-14 RX ADMIN — MIDODRINE HYDROCHLORIDE 5 MG: 5 TABLET ORAL at 08:16

## 2022-02-14 RX ADMIN — ATORVASTATIN CALCIUM 40 MG: 40 TABLET, FILM COATED ORAL at 20:46

## 2022-02-14 RX ADMIN — GABAPENTIN 800 MG: 400 CAPSULE ORAL at 18:00

## 2022-02-14 RX ADMIN — SODIUM CHLORIDE, PRESERVATIVE FREE 5 ML: 5 INJECTION INTRAVENOUS at 14:00

## 2022-02-14 RX ADMIN — DEXLANSOPRAZOLE 60 MG: 60 CAPSULE, DELAYED RELEASE ORAL at 09:00

## 2022-02-14 RX ADMIN — SODIUM CHLORIDE, PRESERVATIVE FREE 10 ML: 5 INJECTION INTRAVENOUS at 06:15

## 2022-02-14 RX ADMIN — ESCITALOPRAM OXALATE 10 MG: 10 TABLET ORAL at 18:00

## 2022-02-14 RX ADMIN — APIXABAN 5 MG: 5 TABLET, FILM COATED ORAL at 08:16

## 2022-02-14 RX ADMIN — MIDODRINE HYDROCHLORIDE 5 MG: 5 TABLET ORAL at 14:14

## 2022-02-14 RX ADMIN — HYDROCORTISONE 10 MG: 5 TABLET ORAL at 10:51

## 2022-02-14 RX ADMIN — GABAPENTIN 800 MG: 400 CAPSULE ORAL at 08:16

## 2022-02-14 RX ADMIN — MIDODRINE HYDROCHLORIDE 5 MG: 5 TABLET ORAL at 16:33

## 2022-02-14 RX ADMIN — VITAMIN D, TAB 1000IU (100/BT) 2000 UNITS: 25 TAB at 08:16

## 2022-02-14 RX ADMIN — APIXABAN 5 MG: 5 TABLET, FILM COATED ORAL at 21:04

## 2022-02-14 RX ADMIN — TRAMADOL HYDROCHLORIDE 100 MG: 50 TABLET, COATED ORAL at 08:16

## 2022-02-14 RX ADMIN — HYDROCORTISONE 5 MG: 5 TABLET ORAL at 16:33

## 2022-02-14 NOTE — PROGRESS NOTES
Review notes prior to visit     patient was calm and alert     receptive to support of     Assured her of our prayers    Thankful

## 2022-02-14 NOTE — PROGRESS NOTES
Patient's son called to discuss discharge plans. He thought patient was going to discharge today. CM explained that patient may discharge tomorrow since her sats dropped when she had her 02 off for a few minutes. MD felt it would be a safer discharge to hold off discharge until tomorrow. CM following.

## 2022-02-14 NOTE — PROGRESS NOTES
ACUTE PHYSICAL THERAPY GOALS:  (Developed with and agreed upon by patient and/or caregiver. )  LTG:  (1.)Ms. Fitzegrald will move from supine to sit and sit to supine , scoot up and down and roll side to side in flat bed without siderails with  INDEPENDENT within 7 day(s).    (2.)Ms. Fitzgerald will perform all functional transfers with  MODIFIED INDEPENDENCE using the least restrictive/no device within 7 day(s).    (3.)Ms. Fitzgerald will ambulate with  independently with rolling walker  for 100+ feet with normal vital sign response  within 7 day(s). PHYSICAL THERAPY: Daily Note and AM Treatment Day # [id number] Vinicio Fitzgerald is a 80 y.o. female   PRIMARY DIAGNOSIS: Acute hypoxemic respiratory failure due to COVID-19 [facility])  Acute hypoxemic respiratory failure due to COVID-19 ([facility] [U07.1, J96.01]         ASSESSMENT:     REHAB RECOMMENDATIONS: CURRENT LEVEL OF FUNCTION:  (Most Recently Demonstrated)   Recommendation to date pending progress:  [address] [place] [address] Therapy  Equipment:    To Be Determined Bed Mobility:   Supervision  Sit to Stand:   Supervision  Transfers:   Supervision  Gait/Mobility:   Supervision     ASSESSMENT:  Ms. Valentina Rush was working with OT on arrival and cont to demonstrate good progress toward goals as she was able to perform all activity today c (S) only. Furthermore, pt was on 5L HFNC throughout duration of session and her SpO2 was only noted to drop briefly into 89-90% range before quickly returning to WNL. Pt cont to ambulate c dec renetta and shuffling nature however she is able to ambulate safely within RW and did not require physical (A) from staff at any point during session. Pt reporting she feels that she is improving and she has no concerns regarding home DC with family (A). Overall, pt continuing to improve. Will continue to follow. SUBJECTIVE:   Ms. Valentina Rush \"I'm not much of a jigger\"     SOCIAL HISTORY/ LIVING ENVIRONMENT: Ms. Fitzgerald lives with her , son, and DIL.   She ambulates independently and is primarily homebound.   Support Systems: Spouse/Significant Other,Child(sky)  OBJECTIVE:     PAIN: VITAL SIGNS: LINES/DRAINS:   Pre Treatment: Pain Screen  Pain Scale 1: Numeric (0 - 10)  Pain Intensity 1: 00  Post Treatment: No complaints Vital Signs  O2 Sat (%): 98 %  O2 Device: Hi flow nasal cannula  O2 Flow Rate (L/min): 5 l/min Continuous Pulse Oximetry  O2 Device: Hi flow nasal cannula     MOBILITY: I Mod I S SBA CGA Min Mod Max Total  NT x2 Comments:   Bed Mobility    Rolling [] [] [] [] [] [] [] [] [] [] []    Supine to Sit [] [] [x] [] [] [] [] [] [] [] []    Scooting [] [] [x] [] [] [] [] [] [] [] []    Sit to Supine [] [] [x] [] [] [] [] [] [] [] []    Transfers    Sit to Stand [] [] [x] [] [] [] [] [] [] [] []    Bed to Chair [] [] [] [] [] [] [] [] [] [] []    Stand to Sit [] [] [x] [] [] [] [] [] [] [] []    I=Independent, Mod I=Modified Independent, S=Supervision, SBA=Standby Assistance, CGA=Contact Guard Assistance,   Min=Minimal Assistance, Mod=Moderate Assistance, Max=Maximal Assistance, Total=Total Assistance, NT=Not Tested    BALANCE: Good Fair+ Fair Fair- Poor NT Comments   Sitting Static [x] [] [] [] [] []    Sitting Dynamic [] [x] [] [] [] []              Standing Static [x] [] [] [] [] [] Within RW   Standing Dynamic [] [x] [] [] [] []      GAIT: I Mod I S SBA CGA Min Mod Max Total  NT x2 Comments:   Level of Assistance [] [] [x] [] [] [] [] [] [] [] []    Distance 40'x3    DME Rolling Walker     Gait Quality Slow short steps    Weightbearing  Status N/A     I=Independent, Mod I=Modified Independent, S=Supervision, SBA=Standby Assistance, CGA=Contact Guard Assistance,   Min=Minimal Assistance, Mod=Moderate Assistance, Max=Maximal Assistance, Total=Total Assistance, NT=Not Tested    PLAN:   FREQUENCY/DURATION: PT Plan of Care: 3 times/week for duration of hospital stay or until stated goals are met, whichever comes first.  TREATMENT:     TREATMENT:   ($$ Therapeutic Activity: 23-37 mins    )  Therapeutic Activity (23 Minutes): Therapeutic activity included Supine to Sit, Sit to Supine, Scooting, Ambulation on level ground, Sitting balance  and Standing balance to improve functional Mobility, Strength and Activity tolerance.     TREATMENT GRID:  N/A    AFTER TREATMENT POSITION/PRECAUTIONS:  Bed, Needs within reach and RN notified    INTERDISCIPLINARY COLLABORATION:  RN/PCT, PT/PTA and OT/ROMERO    TOTAL TREATMENT DURATION:  PT Patient Time In/Time Out  Time In: 1123  Time Out: 201 [facility],

## 2022-02-14 NOTE — PROGRESS NOTES
ACUTE OT GOALS:  (Developed with and agreed upon by patient and/or caregiver.)  1) Patient will complete upper body bathing and dressing with SET UP and adaptive equipment as needed. 2) Patient will complete lower body bathing and dressing with SBA and adaptive equipment as needed. 2) Patient will complete toileting with SBA. 3) Patient will complete functional transfers with SUPERVISION and adaptive equipment as needed. 4) Patient will tolerate at least 25 minutes of OT activity with 1-2 rest breaks while maintaining O2 sats >90%. 5) Patient will verbalize at least 3 energy conservation technique to utilize during ADL/IADL. Timeframe: 7 visits       OCCUPATIONAL THERAPY: Daily Note OT Treatment Day # 5    Pablo Dancer is a 80 y.o. female   PRIMARY DIAGNOSIS: Acute hypoxemic respiratory failure due to COVID-19 [facility])  [facility] [U07.1, J96.01]       Payor: [employer] MEDICARE / Plan: [employer] [address] / Product Type: Managed Care Medicare /   ASSESSMENT:     REHAB RECOMMENDATIONS: CURRENT LEVEL OF FUNCTION:  (Most Recently Demonstrated)   Recommendation to date pending progress:  [address] [place] [address] Therapy  Equipment:    To Be Determined Bathing:   Not tested  Dressing:   Not tested  Feeding/Grooming:   Moderate Assistance to wash hair with shampoo cap  Toileting:   Supervision  Functional Mobility:   SBA/CGA     ASSESSMENT:  Ms. Ese Jimenez presents in supine and transfers to sitting with supervision. Pt only on 5 liters of O2 today. With mobility pt desats to 89-90% but recovers quickly. Pt re-educated on energy conservation techniques during self care and mobility. SBA/CGA with mobility and sink side ADL's. Good effort. Continue POC. SUBJECTIVE:   Ms. Ese Jimenez states, \"I guess I'm staying one more day now. \"    SOCIAL HISTORY/LIVING ENVIRONMENT:   Support Systems: Spouse/Significant Other,Child(sky)    OBJECTIVE: PAIN: VITAL SIGNS: LINES/DRAINS:   Pre Treatment: Pain Screen  Pain Scale 1: Numeric (0 - 10)  Pain Intensity 1: 0  Post Treatment: 0   Continuous Pulse Oximetry  O2 Device: Hi flow nasal cannula     ACTIVITIES OF DAILY LIVING: I Mod I S SBA CGA Min Mod Max Total NT Comments   BASIC ADLs:              Bathing/ Showering [] [] [] [] [] [] [] [] [] [x]    Toileting [] [] [] [x] [] [] [] [] [] [x]    Dressing [] [] [] [] [] [] [] [] [] [x]    Feeding [] [] [] [] [] [] [] [] [] [x]    Grooming [] [] [] [x] [] [] [] [] [] []    Personal Device Care [] [] [] [] [] [] [] [] [] [x]    Functional Mobility [] [] [] [x] [x] [] [] [] [] []    I=Independent, Mod I=Modified Independent, S=Supervision, SBA=Standby Assistance, CGA=Contact Guard Assistance,   Min=Minimal Assistance, Mod=Moderate Assistance, Max=Maximal Assistance, Total=Total Assistance, NT=Not Tested    MOBILITY: I Mod I S SBA CGA Min Mod Max Total  NT x2 Comments:   Supine to sit [] [] [x] [] [] [] [] [] [] [] []    Sit to supine [] [] [] [] [] [] [] [] [] [x] []    Sit to stand [] [] [] [x] [x] [] [] [] [] [] []    Bed to chair [] [] [] [] [] [] [] [] [] [x] []    I=Independent, Mod I=Modified Independent, S=Supervision, SBA=Standby Assistance, CGA=Contact Guard Assistance,   Min=Minimal Assistance, Mod=Moderate Assistance, Max=Maximal Assistance, Total=Total Assistance, NT=Not Tested    BALANCE: Good Fair+ Fair Fair- Poor NT Comments   Sitting Static [x] [] [] [] [] []    Sitting Dynamic [] [x] [] [] [] []              Standing Static [] [x] [x] [] [] []    Standing Dynamic [] [x] [x] [] [] []      PLAN:   FREQUENCY/DURATION: OT Plan of Care: 3 times/week for duration of hospital stay or until stated goals are met, whichever comes first.    TREATMENT:   TREATMENT:   ($$ Self Care/Home Management: 8-22 mins$$ Therapeutic Activity: 23-37 mins   )  Co-Treatment PT/OT necessary due to patient's decreased overall endurance/tolerance levels, as well as need for high level skilled assistance to complete functional transfers/mobility and functional tasks  Therapeutic Activity (25 Minutes): Therapeutic activity included Supine to Sit, Transfer Training, Ambulation on level ground, Sitting balance , Standing balance and exercises to improve functional Mobility, Strength, ROM and Activity tolerance. Self Care (13 Minutes): Self care including Grooming and Energy Conservation Training to increase independence and decrease level of assistance required.     TREATMENT GRID:  N/A    AFTER TREATMENT POSITION/PRECAUTIONS:  Needs within reach, RN notified and edge of bed working with PT    INTERDISCIPLINARY COLLABORATION:  RN/PCT and OT/ROMERO    TOTAL TREATMENT DURATION:  OT Patient Time In/Time Out  Time In: 1102  Time Out: 70 Abby Higgins

## 2022-02-14 NOTE — PROGRESS NOTES
Patient has had good night, she has called to be assisted to [facility] and has slept at intervals. Patient had BM this am in [facility] and pulled off her wet brief, and was replacing brief herself. She will continue to be monitored and assisted as needed, will prepare to give report to oncoming nurse.

## 2022-02-14 NOTE — PROGRESS NOTES
Hospitalist Progress Note   Admit Date:  2022 12:32 PM   Name:  Alexis Holm   Age:  80 y.o. Sex:  female  :  1935   MRN:  [id number]   Room:  [facility]/    Presenting Complaint: Positive For Covid-19    Reason(s) for Admission: Acute hypoxemic respiratory failure due to COVID-19 [facility]) [U07.1, J96.01]     Hospital Course & Interval History:     Radha Carlos a 80 y.o. female with medical history of HTN, mixed HLD, GERD, Glaucoma, Hay fever,  HLD, insomnia who presented from home via EMS with hypoxia and AMS with recent diagnosis of COVID-19. EMS reports patient was hypoxic on arrival, O2 saturation in the 60s.  Placed on 6 L and given terbutaline.  EMS also noted family some concern for patient being more altered than normal.     In ED, T102 BP 91/56    spo2 74% RA, 85% 6L NC, 91% 55L/m  LA 3.9 CRP 17.3 D dimer 3.85 NT pro BNP 3481 HS trop 1544>1609   CXR BL infiltrates   rec'd Tylenol 1000 mg and decadron 10 mg IV    EKG shows sinus tachycardia, rate 115, , QRS 92, QTc 491  S/p actemra on     Seen by cardiology for elevated troponins and new onset afib. Improved with BB and DOAC. Developed E coli UTI s/p course rocephin     Subjective/24hr Events (22):  desatted this am and took long time to recover needing 7 L HFNC. She c/o weakness, fatigue, depression. Assessment & Plan:   Acute Hypoxemic Respiratory Failure 2/2 COVID-19 PNA   Worsening. Not stable for DC home today. - COVID +   - Unvaccinated   - s/p Decadron 6 mg x 10 doses   - s/p Actemra on admission then was started on Baricitinib x 8 doses, last dose   - Charted O2 Flow Rate (L/min): 5 l/min. Wean as able    Hypotension - associated with COVID-19. Suspected dysautonomica from covid. Stable however remains on midodrine. Discontinued BB. AM cortisol 2, diagnostic of adrenal insuffiency in this setting given severely low, start hydrocortisone 10/5, wean midodrine.  Anticipate bradycardia will also improve. Needs OP FU endocrine      S/p unwitnessed fall on 2/12 // physical debility - deconditioned 2/2 prolonged hospitalization and hypoxic respiratory failure. Plans for [facility] [place]. No LOC or head trauma. hypomagnsemia - results pending       Bradycardia - holding BB. See above. QT prolongation - repeat EKG on 2/10 . Responded to medical therapy. S/p AFIB RVR // Demand ischemia - now lesly. [address] BB. and DOAC. Evaluated by cardiology who signed off. 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 Repeat EKG with normal QTc on 2/10. Increased to theraputic dose 10 mg. Improving. insomonia - chronically on restoril at bedtime. Increases risk of dementia, pna and falls     Chronic pain - cont gabapentin       Dispo/Discharge Planning:    Home tomorrow if stable   Diet:  ADULT DIET Regular; Low Fat/Low Chol/High Fiber/EVA; no mash potatoes,eggs, garcia, carrots and green beans.  does like noodles sweet potatoes  ADULT ORAL NUTRITION SUPPLEMENT Breakfast; Standard High Calorie/High Protein  DVT PPx: doac  Code status: Full Code    Hospital Problems as of 2/14/2022 Date Reviewed: 2/14/2022          Codes Class Noted - Resolved POA    * (Principal) Acute hypoxemic respiratory failure due to COVID-19 [facility]) ICD-10-CM: U07.1, J96.01  ICD-9-CM: 518.81, 079.89, 799.02  1/26/2022 - Present Yes        Sepsis due to COVID-19 [facility]) ICD-10-CM: U07.1, A41.89  ICD-9-CM: 079.89, 995.91  1/26/2022 - Present Yes        Secondary adrenal insufficiency ([facility] ICD-10-CM: E27.49  ICD-9-CM: 255.41  2/14/2022 - Present No        Hypercoagulable state associated with COVID-19 ([facility] ICD-10-CM: U07.1, D68.69  ICD-9-CM: 289.82, 079.89  1/26/2022 - Present Yes        Atrial fibrillation with RVR ([facility]) ICD-10-CM: I48.91  ICD-9-CM: 427.31  2/14/2022 - Present No        Hypotension ICD-10-CM: I95.9  ICD-9-CM: 458.9  2/9/2022 - Present Unknown        E-coli UTI ICD-10-CM: N39.0, B96.20  ICD-9-CM: 599.0, 041.49  2/3/2022 - Present Yes        Hypomagnesemia ICD-10-CM: E83.42  ICD-9-CM: 275.2  1/26/2022 - Present Yes        Hypokalemia ICD-10-CM: E87.6  ICD-9-CM: 276.8  1/26/2022 - Present Yes        Prolonged Q-T interval on ECG ICD-10-CM: R94.31  ICD-9-CM: 794.31  1/26/2022 - Present Yes        Elevated troponin ICD-10-CM: R77.8  ICD-9-CM: 790.6  1/26/2022 - Present Yes        Acute metabolic encephalopathy [address]: G93.41  ICD-9-CM: 348.31  1/26/2022 - Present Yes        Chronic prescription benzodiazepine use ICD-10-CM: Z79.899  ICD-9-CM: V58.69  1/26/2022 - Present Yes        Transaminitis ICD-10-CM: R74.01  ICD-9-CM: 790.4  1/26/2022 - Present Yes        Gastroesophageal reflux disease without esophagitis ICD-10-CM: K21.9  ICD-9-CM: 530.81  9/28/2017 - Present Yes    Overview Signed 1/9/2018 10:12 AM by Marina Potts DO     Last Assessment & Plan:   Stable. Essential (primary) hypertension ICD-10-CM: I10  ICD-9-CM: 401.9  10/28/2016 - Present Yes    Overview Signed 1/9/2018 10:12 AM by Marina Potts DO     Last Assessment & Plan:   Stable. Hyperlipidemia ICD-10-CM: E78.5  ICD-9-CM: 272.4  10/28/2016 - Present Yes    Overview Signed 1/9/2018 10:12 AM by Marina Potts DO     Last Assessment & Plan:   Return for fasting labs. Insomnia, persistent ICD-10-CM: G47.00  ICD-9-CM: 307.42  4/27/2016 - Present Yes    Overview Signed 1/9/2018 10:12 AM by Marina Potts DO     Last Assessment & Plan:   Restoril prescription printed & given. Primary osteoarthritis involving multiple joints ICD-10-CM: M89.49  ICD-9-CM: 715.98  7/31/2015 - Present Yes    Overview Signed 1/9/2018 10:12 AM by Marina Potts DO     Last Assessment & Plan:   3 Norco scripts printed with do not fill before dates of 10/10, 11/9, 12/9.  Tramadol prescription printed & given (decreased from 150 to 120 tablets per month based on how patient is taking it). Aware of risk of sedation and aware to get prescriptions only in our office. Discussed polypharmacy & fall risk with patient's multiple sedating medications, encouraged to limit use as much as possible. Will have her see Dr. Inés Moseley for next follow up to review pain medication regimen. Pt verbalized understanding, agreed with plan. Objective:     Patient Vitals for the past 24 hrs:   Temp Pulse Resp BP SpO2   02/14/22 1529 98.1 °F (36.7 °C) 70 20 113/66 95 %   02/14/22 1145 98.3 °F (36.8 °C) 68 20 107/65 98 %   02/14/22 1123 -- -- -- -- 98 %   02/14/22 0802 98.1 °F (36.7 °C) 64 20 106/64 98 %   02/14/22 0249 98.9 °F (37.2 °C) 67 22 103/62 97 %   02/13/22 2246 98.6 °F (37 °C) (!) 58 20 122/63 97 %   02/13/22 2122 -- -- -- -- 97 %   02/1935 98.6 °F (37 °C) 61 20 117/60 97 %   02/13/22 1638 98.5 °F (36.9 °C) (!) 59 20 119/65 99 %     Oxygen Therapy  O2 Sat (%): 95 % (02/14/22 1529)  Pulse via Oximetry: 62 beats per minute (02/13/22 2122)  O2 Device: Hi flow nasal cannula (02/14/22 1123)  Skin Assessment: Clean, dry, & intact (02/10/22 0930)  Skin Protection for O2 Device: No (02/10/22 0930)  O2 Flow Rate (L/min): 5 l/min (02/14/22 1123)  O2 Temperature: 87.8 °F (31 °C) (02/10/22 0930)  FIO2 (%): 45 % (02/11/22 1137)    Estimated body mass index is 25.92 kg/m² as calculated from the following:    Height as of this encounter: 5' 1\" (1.549 m). Weight as of this encounter: 62.2 kg (137 lb 3.2 oz). Intake/Output Summary (Last 24 hours) at 2/14/2022 1633  Last data filed at 2/14/2022 1410  Gross per 24 hour   Intake 740 ml   Output --   Net 740 ml         Physical Exam:     Blood pressure 113/66, pulse 70, temperature 98.1 °F (36.7 °C), resp. rate 20, height 5' 1\" (1.549 m), weight 62.2 kg (137 lb 3.2 oz), SpO2 95 %. Physical Exam  Vitals and nursing note reviewed. Constitutional:       General: She is not in acute distress.   HENT:      Mouth/Throat:      Mouth: Mucous membranes are moist.   Cardiovascular:      Rate and Rhythm: Regular rhythm. Bradycardia present. Pulmonary:      Effort: Pulmonary effort is normal.   Neurological:      Mental Status: She is alert and oriented to person, place, and time.    Psychiatric:         Mood and Affect: Mood normal.         I have reviewed ordered lab tests and independently visualized imaging below:    Recent Labs:  Recent Results (from the past 48 hour(s))   CBC W/O DIFF    Collection Time: 02/14/22  3:48 AM   Result Value Ref Range    WBC 3.6 (L) 4.3 - 11.1 K/uL    RBC 3.41 (L) 4.05 - 5.2 M/uL    HGB 10.0 (L) 11.7 - 15.4 g/dL    HCT 31.9 (L) 35.8 - 46.3 %    MCV 93.5 79.6 - 97.8 FL    MCH 29.3 26.1 - 32.9 PG    MCHC 31.3 (L) 31.4 - 35.0 g/dL    RDW 14.4 11.9 - 14.6 %    PLATELET 242 130 - 434 K/uL    MPV 11.1 9.4 - 12.3 FL    ABSOLUTE NRBC 0.00 0.0 - 0.2 K/uL   METABOLIC PANEL, BASIC    Collection Time: 02/14/22  3:48 AM   Result Value Ref Range    Sodium 141 136 - 145 mmol/L    Potassium 4.4 3.5 - 5.1 mmol/L    Chloride 105 98 - 107 mmol/L    CO2 36 (H) 21 - 32 mmol/L    Anion gap 0 (L) 7 - 16 mmol/L    Glucose 104 (H) 65 - 100 mg/dL    BUN 19 8 - 23 MG/DL    Creatinine 0.70 0.6 - 1.0 MG/DL    GFR est AA >60 >60 ml/min/1.73m2    GFR est non-AA >60 >60 ml/min/1.73m2    Calcium 8.9 8.3 - 10.4 MG/DL   CORTISOL, AM    Collection Time: 02/14/22  3:48 AM   Result Value Ref Range    Cortisol, a.m. 2.0 (L) 7 - 25 ug/dL   TSH 3RD GENERATION    Collection Time: 02/14/22  3:48 AM   Result Value Ref Range    TSH 1.480 0.358 - 3.740 uIU/mL       All Micro Results     Procedure Component Value Units Date/Time    CULTURE, URINE [[id number]]  (Abnormal)  (Susceptibility) Collected: 01/28/22 1105    Order Status: Completed Specimen: Urine from Clean catch Updated: 01/30/22 1402     Special Requests: NO SPECIAL REQUESTS        Culture result:       >100,000 COLONIES/mL ESCHERICHIA COLI                  <1,000 CFU/ML MIXED SKIN SCOTT ISOLATED COVID-19 RAPID TEST [[id number]]  (Abnormal) Collected: 01/26/22 9976    Order Status: Completed Specimen: Nasopharyngeal Updated: 01/26/22 1316     Specimen source NASAL        COVID-19 rapid test Detected        Comment:      The specimen is POSITIVE for SARS-CoV-2, the novel coronavirus associated with COVID-19. This test has been authorized by the FDA under an Emergency Use Authorization (EUA) for use by authorized laboratories. Fact sheet for Healthcare Providers: ConventionTweetUpdate.co.nz  Fact sheet for Patients: Entefydate.co.nz       Methodology: Isothermal Nucleic Acid Amplification               Other Studies:  No results found.     Current Meds:  Current Facility-Administered Medications   Medication Dose Route Frequency    hydrocortisone (CORTEF) tablet 10 mg  10 mg Oral ACB    And    hydrocortisone (CORTEF) tablet 5 mg  5 mg Oral ACD    escitalopram oxalate (LEXAPRO) tablet 10 mg  10 mg Oral QPM    temazepam (RESTORIL) capsule 30 mg  30 mg Oral QHS    midodrine (PROAMATINE) tablet 5 mg  5 mg Oral TID WITH MEALS    lidocaine 4 % patch 1 Patch  1 Patch TransDERmal Q24H    gabapentin (NEURONTIN) capsule 800 mg  800 mg Oral BID    lip protectant (BLISTEX) ointment 1 Each  1 Each Topical PRN    apixaban (ELIQUIS) tablet 5 mg  5 mg Oral BID    traMADoL (ULTRAM) tablet 100 mg  100 mg Oral Q8H PRN    dexlansoprazole (DEXILANT) capsule (delayed release) CpDB 60 mg (Patient Supplied)  60 mg Oral DAILY    sodium chloride (NS) flush 5-10 mL  5-10 mL IntraVENous Q8H    sodium chloride (NS) flush 5-10 mL  5-10 mL IntraVENous PRN    sodium chloride (NS) flush 5-40 mL  5-40 mL IntraVENous Q8H    sodium chloride (NS) flush 5-40 mL  5-40 mL IntraVENous PRN    acetaminophen (TYLENOL) tablet 650 mg  650 mg Oral Q6H PRN    Or    acetaminophen (TYLENOL) suppository 650 mg  650 mg Rectal Q6H PRN    polyethylene glycol (MIRALAX) packet 17 g  17 g Oral DAILY PRN    ondansetron (ZOFRAN ODT) tablet 4 mg  4 mg Oral Q8H PRN    Or    ondansetron (ZOFRAN) injection 4 mg  4 mg IntraVENous Q6H PRN    guaiFENesin-dextromethorphan (ROBITUSSIN DM) 100-10 mg/5 mL syrup 5 mL  5 mL Oral Q4H PRN    cholecalciferol (VITAMIN D3) (1000 Units /25 mcg) tablet 2,000 Units  2,000 Units Oral DAILY    alum-mag hydroxide-simeth (MYLANTA) oral suspension 15 mL  15 mL Oral Q6H PRN    albuterol (PROVENTIL HFA, VENTOLIN HFA, PROAIR HFA) inhaler 2 Puff  2 Puff Inhalation Q4H PRN    atorvastatin (LIPITOR) tablet 40 mg  40 mg Oral QHS       Signed:  Gudelia Martinez DO    Part of this note may have been written by using a voice dictation software. The note has been proof read but may still contain some grammatical/other typographical errors.

## 2022-02-15 LAB
ANION GAP SERPL CALC-SCNC: 2 MMOL/L (ref 7–16)
BUN SERPL-MCNC: 15 MG/DL (ref 8–23)
CALCIUM SERPL-MCNC: 8.7 MG/DL (ref 8.3–10.4)
CHLORIDE SERPL-SCNC: 107 MMOL/L (ref 98–107)
CO2 SERPL-SCNC: 34 MMOL/L (ref 21–32)
CREAT SERPL-MCNC: 0.6 MG/DL (ref 0.6–1)
ERYTHROCYTE [DISTWIDTH] IN BLOOD BY AUTOMATED COUNT: 14.6 % (ref 11.9–14.6)
GLUCOSE SERPL-MCNC: 87 MG/DL (ref 65–100)
HCT VFR BLD AUTO: 30.3 % (ref 35.8–46.3)
HGB BLD-MCNC: 9.5 G/DL (ref 11.7–15.4)
MAGNESIUM SERPL-MCNC: 2 MG/DL (ref 1.6–2.3)
MCH RBC QN AUTO: 29.3 PG (ref 26.1–32.9)
MCHC RBC AUTO-ENTMCNC: 31.4 G/DL (ref 31.4–35)
MCV RBC AUTO: 93.5 FL (ref 79.6–97.8)
NRBC # BLD: 0 K/UL (ref 0–0.2)
PLATELET # BLD AUTO: 167 K/UL (ref 150–450)
PMV BLD AUTO: 10.8 FL (ref 9.4–12.3)
POTASSIUM SERPL-SCNC: 3.6 MMOL/L (ref 3.5–5.1)
RBC # BLD AUTO: 3.24 M/UL (ref 4.05–5.2)
SODIUM SERPL-SCNC: 143 MMOL/L (ref 136–145)
WBC # BLD AUTO: 3.8 K/UL (ref 4.3–11.1)

## 2022-02-15 PROCEDURE — 74011000250 HC RX REV CODE- 250: Performed by: FAMILY MEDICINE

## 2022-02-15 PROCEDURE — 36415 COLL VENOUS BLD VENIPUNCTURE: CPT

## 2022-02-15 PROCEDURE — 74011250637 HC RX REV CODE- 250/637: Performed by: FAMILY MEDICINE

## 2022-02-15 PROCEDURE — 65660000000 HC RM CCU STEPDOWN

## 2022-02-15 PROCEDURE — 74011250637 HC RX REV CODE- 250/637: Performed by: STUDENT IN AN ORGANIZED HEALTH CARE EDUCATION/TRAINING PROGRAM

## 2022-02-15 PROCEDURE — 80048 BASIC METABOLIC PNL TOTAL CA: CPT

## 2022-02-15 PROCEDURE — 85027 COMPLETE CBC AUTOMATED: CPT

## 2022-02-15 PROCEDURE — 74011000250 HC RX REV CODE- 250: Performed by: STUDENT IN AN ORGANIZED HEALTH CARE EDUCATION/TRAINING PROGRAM

## 2022-02-15 RX ADMIN — SODIUM CHLORIDE, PRESERVATIVE FREE 10 ML: 5 INJECTION INTRAVENOUS at 06:27

## 2022-02-15 RX ADMIN — DEXLANSOPRAZOLE 60 MG: 60 CAPSULE, DELAYED RELEASE ORAL at 09:00

## 2022-02-15 RX ADMIN — MIDODRINE HYDROCHLORIDE 2.5 MG: 5 TABLET ORAL at 16:16

## 2022-02-15 RX ADMIN — TEMAZEPAM 30 MG: 15 CAPSULE ORAL at 21:13

## 2022-02-15 RX ADMIN — HYDROCORTISONE 10 MG: 5 TABLET ORAL at 06:26

## 2022-02-15 RX ADMIN — HYDROCORTISONE 5 MG: 5 TABLET ORAL at 16:16

## 2022-02-15 RX ADMIN — TRAMADOL HYDROCHLORIDE 100 MG: 50 TABLET, COATED ORAL at 08:13

## 2022-02-15 RX ADMIN — VITAMIN D, TAB 1000IU (100/BT) 2000 UNITS: 25 TAB at 08:13

## 2022-02-15 RX ADMIN — ATORVASTATIN CALCIUM 40 MG: 40 TABLET, FILM COATED ORAL at 21:13

## 2022-02-15 RX ADMIN — SODIUM CHLORIDE, PRESERVATIVE FREE 10 ML: 5 INJECTION INTRAVENOUS at 21:14

## 2022-02-15 RX ADMIN — SODIUM CHLORIDE, PRESERVATIVE FREE 10 ML: 5 INJECTION INTRAVENOUS at 16:19

## 2022-02-15 RX ADMIN — MIDODRINE HYDROCHLORIDE 2.5 MG: 5 TABLET ORAL at 08:13

## 2022-02-15 RX ADMIN — GABAPENTIN 800 MG: 400 CAPSULE ORAL at 17:11

## 2022-02-15 RX ADMIN — ESCITALOPRAM OXALATE 10 MG: 10 TABLET ORAL at 17:11

## 2022-02-15 RX ADMIN — MIDODRINE HYDROCHLORIDE 2.5 MG: 5 TABLET ORAL at 12:22

## 2022-02-15 RX ADMIN — APIXABAN 5 MG: 5 TABLET, FILM COATED ORAL at 08:13

## 2022-02-15 RX ADMIN — APIXABAN 5 MG: 5 TABLET, FILM COATED ORAL at 21:13

## 2022-02-15 RX ADMIN — GABAPENTIN 800 MG: 400 CAPSULE ORAL at 08:13

## 2022-02-15 NOTE — PROGRESS NOTES
Assumed care of pt at this time. Pt is sleeping in bed at this time. Pt has even and unlabored respirations. Pt is on 5L NC. Pt has safety measures in place. Will continue to monitor.

## 2022-02-15 NOTE — PROGRESS NOTES
Bedside report received from Nasima Marie RN. No distress. Remains on 5L via NC. Instructed pt to call should needs arise. Pt voiced understanding. Call light within reach.

## 2022-02-15 NOTE — PROGRESS NOTES
RESPIRATORY PORE test=Sp02 97% on 3 lpm via n/c hr 65 Sp02 93% at rest on room air,,,the pt walked around her room 110' Sp02 77% on room air HR at 72 during walk. The pt had to have 02 at 5 lpm via n/c during walk to maintain Sp02 92%.  She was returned to her bed and placed back in her high shi cannula at 3 lpm.

## 2022-02-15 NOTE — PROGRESS NOTES
Pt is resting in bed at this time. Pt is on 5L HFNC. Pt has no s/sx of acute distress at this time. Pt has safety measures in place. Pt has call light within reach.  Report given to [place]

## 2022-02-15 NOTE — PROGRESS NOTES
Wanting to discharge educated about condition to the best of my scope verbalized understanding monitoring denies any further needs

## 2022-02-15 NOTE — PROGRESS NOTES
Hospitalist Progress Note   Admit Date:  2022 12:32 PM   Name:  Myrna Nassar   Age:  80 y.o. Sex:  female  :  1935   MRN:  [id number]   Room:  [facility]    Presenting Complaint: Positive For Covid-19    Reason(s) for Admission: Acute hypoxemic respiratory failure due to COVID-19 ([facility] [U07.1, J96.01]     Hospital Course & Interval History:       Ms. Diamond Pulido is an 79 yo female with PMH of HTN, HLP, admitted with COVID 19 and acute hypoxic respiratory failure. CXR bilateral infiltrates. S/p ACTEMRA 22. Completed decadron. She had elevated troponin and new onset AFIB. Seen by cardiology. Added eliquis and BBlocker. S/p course of rocephin for UTI. She has been managed for hypotension with midodrine and suspected adrenal insufficiency. Discharge plans pending to home. Subjective/24hr Events (02/15/22): Not short of breath, no dizziness, no cough, ate ok, family will be at home, ATILIO does drop     Assessment & Plan:     Principal Problem:    Acute hypoxemic respiratory failure due to COVID-19 [facility])    [facility]   · Assess home O2 needs   · On 5 L NC 2-15-22  · S/p ACTEMRA 22  · Completed decadron 10 days        Hyperlipidemia   · Continued lipitor        Prolonged Q-T interval on ECG    Elevated troponin     Atrial fibrillation with RVR ([facility]  · Continued eliquis      Acute metabolic encephalopathy   ·  resolved         E-coli UTI   ·  s/p rocephin course             Secondary adrenal insufficiency ([facility] [address]   ·  cortef started as well as midodrine      Anemia:  · Trend HGB        Dispo/Discharge Planning:    pending    Diet:  ADULT DIET Regular; Low Fat/Low Chol/High Fiber/EVA; no mash potatoes,eggs, garcia, carrots and green beans.  does like noodles sweet potatoes  ADULT ORAL NUTRITION SUPPLEMENT Breakfast; Standard High Calorie/High Protein  DVT PPx: eliquisus:   Full Code    Hospital Problems as of 2/15/2022 Date Reviewed: 2022          Codes Class Noted - Resolved POA    Secondary adrenal insufficiency (HCC) ICD-10-CM: E27.49  ICD-9-CM: 255.41  2/14/2022 - Present No        Atrial fibrillation with RVR (HCC) ICD-10-CM: I48.91  ICD-9-CM: 427.31  2/14/2022 - Present No        Hypotension ICD-10-CM: I95.9  ICD-9-CM: 458.9  2/9/2022 - Present Unknown        E-coli UTI ICD-10-CM: N39.0, B96.20  ICD-9-CM: 599.0, 041.49  2/3/2022 - Present Yes        * (Principal) Acute hypoxemic respiratory failure due to COVID-19 [facility]) ICD-10-CM: U07.1, J96.01  ICD-9-CM: 518.81, 079.89, 799.02  1/26/2022 - Present Yes        Sepsis due to COVID-19 [facility]) ICD-10-CM: U07.1, A41.89  ICD-9-CM: 079.89, 995.91  1/26/2022 - Present Yes        Hypomagnesemia ICD-10-CM: E83.42  ICD-9-CM: 275.2  1/26/2022 - Present Yes        Hypokalemia ICD-10-CM: E87.6  ICD-9-CM: 276.8  1/26/2022 - Present Yes        Prolonged Q-T interval on ECG ICD-10-CM: R94.31  ICD-9-CM: 794.31  1/26/2022 - Present Yes        Elevated troponin ICD-10-CM: R77.8  ICD-9-CM: 790.6  1/26/2022 - Present Yes        Acute metabolic encephalopathy ADG-29-HZ: G93.41  ICD-9-CM: 348.31  1/26/2022 - Present Yes        Chronic prescription benzodiazepine use ICD-10-CM: Z79.899  ICD-9-CM: V58.69  1/26/2022 - Present Yes        Hypercoagulable state associated with COVID-19 ([facility] [address] [address] ICD-10-CM: U07.1, D68.69  ICD-9-CM: 289.82, 079.89  1/26/2022 - Present Yes        Transaminitis ICD-10-CM: R74.01  ICD-9-CM: 790.4  1/26/2022 - Present Yes        Gastroesophageal reflux disease without esophagitis ICD-10-CM: K21.9  ICD-9-CM: 530.81  9/28/2017 - Present Yes    Overview Signed 1/9/2018 10:12 AM by Jocelyn Lucero DO     Last Assessment & Plan:   Stable. Essential (primary) hypertension ICD-10-CM: I10  ICD-9-CM: 401.9  10/28/2016 - Present Yes    Overview Signed 1/9/2018 10:12 AM by Jocelyn Lucero DO     Last Assessment & Plan:   Stable.              Hyperlipidemia ICD-10-CM: E78.5  ICD-9-CM: 272.4  10/28/2016 - Present Yes Overview Signed 1/9/2018 10:12 AM by Manohar Arthur DO     Last Assessment & Plan:   Return for fasting labs. Insomnia, persistent ICD-10-CM: G47.00  ICD-9-CM: 307.42  4/27/2016 - Present Yes    Overview Signed 1/9/2018 10:12 AM by Manohar Arthur DO     Last Assessment & Plan:   Restoril prescription printed & given. Primary osteoarthritis involving multiple joints ICD-10-CM: M89.49  ICD-9-CM: 715.98  7/31/2015 - Present Yes    Overview Signed 1/9/2018 10:12 AM by Manohar Arthur DO     Last Assessment & Plan:   3 Norco scripts printed with do not fill before dates of 10/10, 11/9, 12/9. Tramadol prescription printed & given (decreased from 150 to 120 tablets per month based on how patient is taking it). Aware of risk of sedation and aware to get prescriptions only in our office. Discussed polypharmacy & fall risk with patient's multiple sedating medications, encouraged to limit use as much as possible. Will have her see Dr. Jude Montgomery for next follow up to review pain medication regimen. Pt verbalized understanding, agreed with plan.                    Objective:     Patient Vitals for the past 24 hrs:   Temp Pulse Resp BP SpO2   02/15/22 1520 -- (!) 56 19 133/64 99 %   02/15/22 1127 97.9 °F (36.6 °C) 66 19 103/78 98 %   02/15/22 0902 -- 73 -- (!) 101/50 --   02/15/22 0745 97.9 °F (36.6 °C) 65 19 (!) 72/38 97 %   02/15/22 0324 98 °F (36.7 °C) (!) 57 21 (!) 105/52 97 %   02/15/22 0016 97.6 °F (36.4 °C) 65 21 120/64 98 %   02/14/22 1955 97.3 °F (36.3 °C) 63 21 119/65 96 %     Oxygen Therapy  O2 Sat (%): 99 % (02/15/22 1520)  Pulse via Oximetry: 62 beats per minute (02/13/22 2122)  O2 Device: Hi flow nasal cannula (02/14/22 2351)  Skin Assessment: Clean, dry, & intact (02/10/22 0930)  Skin Protection for O2 Device: No (02/10/22 0930)  O2 Flow Rate (L/min): 5 l/min (02/14/22 5801)  O2 Temperature: 87.8 °F (31 °C) (02/10/22 0930)  FIO2 (%): 45 % (02/11/22 1137)    Estimated body mass index is 25.92 kg/m² as calculated from the following:    Height as of this encounter: 5' 1\" (1.549 m). Weight as of this encounter: 62.2 kg (137 lb 3.2 oz). Intake/Output Summary (Last 24 hours) at 2/15/2022 1658  Last data filed at 2/15/2022 1332  Gross per 24 hour   Intake 180 ml   Output --   Net 180 ml         Physical Exam:     Blood pressure 133/64, pulse (!) 56, temperature 97.9 °F (36.6 °C), resp. rate 19, height 5' 1\" (1.549 m), weight 62.2 kg (137 lb 3.2 oz), SpO2 99 %. General:    Well nourished. No overt distress  CV:   RRR. No m/r/g. No jugular venous distension. No edema   Lungs:   CTAB. No wheezing, rhonchi, or rales. Respirations even, unlabored  Abdomen: Bowel sounds present. Soft, nontender, nondistended. Extremities: No cyanosis or clubbing. No edema  Skin:     No rashes and normal coloration. Warm and dry. Neuro:  grossly intact. Psych:  Normal mood and affect.       I have reviewed ordered lab tests and independently visualized imaging below:    Recent Labs:  Recent Results (from the past 48 hour(s))   CBC W/O DIFF    Collection Time: 02/14/22  3:48 AM   Result Value Ref Range    WBC 3.6 (L) 4.3 - 11.1 K/uL    RBC 3.41 (L) 4.05 - 5.2 M/uL    HGB 10.0 (L) 11.7 - 15.4 g/dL    HCT 31.9 (L) 35.8 - 46.3 %    MCV 93.5 79.6 - 97.8 FL    MCH 29.3 26.1 - 32.9 PG    MCHC 31.3 (L) 31.4 - 35.0 g/dL    RDW 14.4 11.9 - 14.6 %    PLATELET 801 097 - 266 K/uL    MPV 11.1 9.4 - 12.3 FL    ABSOLUTE NRBC 0.00 0.0 - 0.2 K/uL   METABOLIC PANEL, BASIC    Collection Time: 02/14/22  3:48 AM   Result Value Ref Range    Sodium 141 136 - 145 mmol/L    Potassium 4.4 3.5 - 5.1 mmol/L    Chloride 105 98 - 107 mmol/L    CO2 36 (H) 21 - 32 mmol/L    Anion gap 0 (L) 7 - 16 mmol/L    Glucose 104 (H) 65 - 100 mg/dL    BUN 19 8 - 23 MG/DL    Creatinine 0.70 0.6 - 1.0 MG/DL    GFR est AA >60 >60 ml/min/1.73m2    GFR est non-AA >60 >60 ml/min/1.73m2    Calcium 8.9 8.3 - 10.4 MG/DL   CORTISOL, AM Collection Time: 02/14/22  3:48 AM   Result Value Ref Range    Cortisol, a.m. 2.0 (L) 7 - 25 ug/dL   TSH 3RD GENERATION    Collection Time: 02/14/22  3:48 AM   Result Value Ref Range    TSH 1.480 0.358 - 3.740 uIU/mL   MAGNESIUM    Collection Time: 02/14/22  3:48 AM   Result Value Ref Range    Magnesium 2.0 1.6 - 2.3 mg/dL   CBC W/O DIFF    Collection Time: 02/15/22  4:45 AM   Result Value Ref Range    WBC 3.8 (L) 4.3 - 11.1 K/uL    RBC 3.24 (L) 4.05 - 5.2 M/uL    HGB 9.5 (L) 11.7 - 15.4 g/dL    HCT 30.3 (L) 35.8 - 46.3 %    MCV 93.5 79.6 - 97.8 FL    MCH 29.3 26.1 - 32.9 PG    MCHC 31.4 31.4 - 35.0 g/dL    RDW 14.6 11.9 - 14.6 %    PLATELET 245 482 - 451 K/uL    MPV 10.8 9.4 - 12.3 FL    ABSOLUTE NRBC 0.00 0.0 - 0.2 K/uL   METABOLIC PANEL, BASIC    Collection Time: 02/15/22  4:45 AM   Result Value Ref Range    Sodium 143 136 - 145 mmol/L    Potassium 3.6 3.5 - 5.1 mmol/L    Chloride 107 98 - 107 mmol/L    CO2 34 (H) 21 - 32 mmol/L    Anion gap 2 (L) 7 - 16 mmol/L    Glucose 87 65 - 100 mg/dL    BUN 15 8 - 23 MG/DL    Creatinine 0.60 0.6 - 1.0 MG/DL    GFR est AA >60 >60 ml/min/1.73m2    GFR est non-AA >60 >60 ml/min/1.73m2    Calcium 8.7 8.3 - 10.4 MG/DL       All Micro Results     Procedure Component Value Units Date/Time    CULTURE, URINE [[id number]]  (Abnormal)  (Susceptibility) Collected: 01/28/22 1109    Order Status: Completed Specimen: Urine from Clean catch Updated: 01/30/22 5733     Special Requests: NO SPECIAL REQUESTS        Culture result:       >100,000 COLONIES/mL ESCHERICHIA COLI                  <1,000 CFU/ML MIXED SKIN SCOTT ISOLATED          COVID-19 RAPID TEST [[id number]]  (Abnormal) Collected: 01/26/22 1258    Order Status: Completed Specimen: Nasopharyngeal Updated: 01/26/22 1316     Specimen source NASAL        COVID-19 rapid test Detected        Comment:      The specimen is POSITIVE for SARS-CoV-2, the novel coronavirus associated with COVID-19.        This test has been authorized by the FDA under an Emergency Use Authorization (EUA) for use by authorized laboratories. Fact sheet for Healthcare Providers: ConventionUpdate.co.nz  Fact sheet for Patients: ConventionUpdate.co.nz       Methodology: Isothermal Nucleic Acid Amplification               Other Studies:  No results found.     Current Meds:  Current Facility-Administered Medications   Medication Dose Route Frequency    hydrocortisone (CORTEF) tablet 10 mg  10 mg Oral ACB    And    hydrocortisone (CORTEF) tablet 5 mg  5 mg Oral ACD    midodrine (PROAMATINE) tablet 2.5 mg  2.5 mg Oral TID WITH MEALS    escitalopram oxalate (LEXAPRO) tablet 10 mg  10 mg Oral QPM    temazepam (RESTORIL) capsule 30 mg  30 mg Oral QHS    lidocaine 4 % patch 1 Patch  1 Patch TransDERmal Q24H    gabapentin (NEURONTIN) capsule 800 mg  800 mg Oral BID    lip protectant (BLISTEX) ointment 1 Each  1 Each Topical PRN    apixaban (ELIQUIS) tablet 5 mg  5 mg Oral BID    traMADoL (ULTRAM) tablet 100 mg  100 mg Oral Q8H PRN    dexlansoprazole (DEXILANT) capsule (delayed release) CpDB 60 mg (Patient Supplied)  60 mg Oral DAILY    sodium chloride (NS) flush 5-10 mL  5-10 mL IntraVENous Q8H    sodium chloride (NS) flush 5-10 mL  5-10 mL IntraVENous PRN    sodium chloride (NS) flush 5-40 mL  5-40 mL IntraVENous Q8H    sodium chloride (NS) flush 5-40 mL  5-40 mL IntraVENous PRN    acetaminophen (TYLENOL) tablet 650 mg  650 mg Oral Q6H PRN    Or    acetaminophen (TYLENOL) suppository 650 mg  650 mg Rectal Q6H PRN    polyethylene glycol (MIRALAX) packet 17 g  17 g Oral DAILY PRN    ondansetron (ZOFRAN ODT) tablet 4 mg  4 mg Oral Q8H PRN    Or    ondansetron (ZOFRAN) injection 4 mg  4 mg IntraVENous Q6H PRN    guaiFENesin-dextromethorphan (ROBITUSSIN DM) 100-10 mg/5 mL syrup 5 mL  5 mL Oral Q4H PRN    cholecalciferol (VITAMIN D3) (1000 Units /25 mcg) tablet 2,000 Units  2,000 Units Oral DAILY    alum-mag hydroxide-simeth (MYLANTA) oral suspension 15 mL  15 mL Oral Q6H PRN    albuterol (PROVENTIL HFA, VENTOLIN HFA, PROAIR HFA) inhaler 2 Puff  2 Puff Inhalation Q4H PRN    atorvastatin (LIPITOR) tablet 40 mg  40 mg Oral QHS       Signed:  Yue Jimenez MD    Part of this note may have been written by using a voice dictation software. The note has been proof read but may still contain some grammatical/other typographical errors.

## 2022-02-16 ENCOUNTER — HOME HEALTH ADMISSION (OUTPATIENT)
Dept: HOME HEALTH SERVICES | Facility: HOME HEALTH | Age: 87
End: 2022-02-16

## 2022-02-16 VITALS
DIASTOLIC BLOOD PRESSURE: 64 MMHG | TEMPERATURE: 98.3 F | WEIGHT: 137.2 LBS | HEART RATE: 82 BPM | RESPIRATION RATE: 19 BRPM | HEIGHT: 61 IN | OXYGEN SATURATION: 98 % | SYSTOLIC BLOOD PRESSURE: 100 MMHG | BODY MASS INDEX: 25.9 KG/M2

## 2022-02-16 LAB
ANION GAP SERPL CALC-SCNC: 1 MMOL/L (ref 7–16)
BUN SERPL-MCNC: 15 MG/DL (ref 8–23)
CALCIUM SERPL-MCNC: 8.9 MG/DL (ref 8.3–10.4)
CHLORIDE SERPL-SCNC: 107 MMOL/L (ref 98–107)
CO2 SERPL-SCNC: 35 MMOL/L (ref 21–32)
CREAT SERPL-MCNC: 0.7 MG/DL (ref 0.6–1)
ERYTHROCYTE [DISTWIDTH] IN BLOOD BY AUTOMATED COUNT: 15.1 % (ref 11.9–14.6)
GLUCOSE SERPL-MCNC: 94 MG/DL (ref 65–100)
HCT VFR BLD AUTO: 30.6 % (ref 35.8–46.3)
HGB BLD-MCNC: 9.5 G/DL (ref 11.7–15.4)
MCH RBC QN AUTO: 28.9 PG (ref 26.1–32.9)
MCHC RBC AUTO-ENTMCNC: 31 G/DL (ref 31.4–35)
MCV RBC AUTO: 93 FL (ref 79.6–97.8)
NRBC # BLD: 0 K/UL (ref 0–0.2)
PLATELET # BLD AUTO: 147 K/UL (ref 150–450)
PMV BLD AUTO: 10.9 FL (ref 9.4–12.3)
POTASSIUM SERPL-SCNC: 4.3 MMOL/L (ref 3.5–5.1)
RBC # BLD AUTO: 3.29 M/UL (ref 4.05–5.2)
SODIUM SERPL-SCNC: 143 MMOL/L (ref 136–145)
WBC # BLD AUTO: 3.5 K/UL (ref 4.3–11.1)

## 2022-02-16 PROCEDURE — 80048 BASIC METABOLIC PNL TOTAL CA: CPT

## 2022-02-16 PROCEDURE — 74011250637 HC RX REV CODE- 250/637: Performed by: FAMILY MEDICINE

## 2022-02-16 PROCEDURE — 74011000250 HC RX REV CODE- 250: Performed by: FAMILY MEDICINE

## 2022-02-16 PROCEDURE — 85027 COMPLETE CBC AUTOMATED: CPT

## 2022-02-16 PROCEDURE — 36415 COLL VENOUS BLD VENIPUNCTURE: CPT

## 2022-02-16 PROCEDURE — 74011250637 HC RX REV CODE- 250/637: Performed by: STUDENT IN AN ORGANIZED HEALTH CARE EDUCATION/TRAINING PROGRAM

## 2022-02-16 PROCEDURE — 74011000250 HC RX REV CODE- 250: Performed by: STUDENT IN AN ORGANIZED HEALTH CARE EDUCATION/TRAINING PROGRAM

## 2022-02-16 PROCEDURE — 97530 THERAPEUTIC ACTIVITIES: CPT

## 2022-02-16 RX ORDER — ESCITALOPRAM OXALATE 10 MG/1
10 TABLET ORAL EVERY EVENING
Qty: 30 TABLET | Refills: 0 | Status: SHIPPED | OUTPATIENT
Start: 2022-02-16 | End: 2022-02-16

## 2022-02-16 RX ORDER — HYDROCORTISONE 5 MG/1
5 TABLET ORAL
Qty: 30 TABLET | Refills: 0 | Status: SHIPPED | OUTPATIENT
Start: 2022-02-16 | End: 2022-03-18

## 2022-02-16 RX ORDER — MIDODRINE HYDROCHLORIDE 2.5 MG/1
2.5 TABLET ORAL
Qty: 90 TABLET | Refills: 0 | Status: SHIPPED | OUTPATIENT
Start: 2022-02-16 | End: 2022-02-16

## 2022-02-16 RX ORDER — ESCITALOPRAM OXALATE 10 MG/1
10 TABLET ORAL EVERY EVENING
Qty: 30 TABLET | Refills: 0 | Status: SHIPPED | OUTPATIENT
Start: 2022-02-16 | End: 2022-03-18

## 2022-02-16 RX ORDER — HYDROCORTISONE 10 MG/1
10 TABLET ORAL
Qty: 30 TABLET | Refills: 0 | Status: SHIPPED | OUTPATIENT
Start: 2022-02-17 | End: 2022-03-19

## 2022-02-16 RX ORDER — HYDROCORTISONE 5 MG/1
5 TABLET ORAL
Qty: 30 TABLET | Refills: 0 | Status: SHIPPED | OUTPATIENT
Start: 2022-02-16 | End: 2022-02-16

## 2022-02-16 RX ORDER — MIDODRINE HYDROCHLORIDE 2.5 MG/1
2.5 TABLET ORAL
Qty: 90 TABLET | Refills: 0 | Status: SHIPPED | OUTPATIENT
Start: 2022-02-16 | End: 2022-03-18

## 2022-02-16 RX ORDER — HYDROCORTISONE 10 MG/1
10 TABLET ORAL
Qty: 30 TABLET | Refills: 0 | Status: SHIPPED | OUTPATIENT
Start: 2022-02-17 | End: 2022-02-16

## 2022-02-16 RX ADMIN — SODIUM CHLORIDE, PRESERVATIVE FREE 10 ML: 5 INJECTION INTRAVENOUS at 13:53

## 2022-02-16 RX ADMIN — MIDODRINE HYDROCHLORIDE 2.5 MG: 5 TABLET ORAL at 08:18

## 2022-02-16 RX ADMIN — HYDROCORTISONE 10 MG: 5 TABLET ORAL at 06:30

## 2022-02-16 RX ADMIN — MIDODRINE HYDROCHLORIDE 2.5 MG: 5 TABLET ORAL at 11:44

## 2022-02-16 RX ADMIN — DEXLANSOPRAZOLE 60 MG: 60 CAPSULE, DELAYED RELEASE ORAL at 09:00

## 2022-02-16 RX ADMIN — VITAMIN D, TAB 1000IU (100/BT) 2000 UNITS: 25 TAB at 08:18

## 2022-02-16 RX ADMIN — GABAPENTIN 800 MG: 400 CAPSULE ORAL at 08:18

## 2022-02-16 RX ADMIN — SODIUM CHLORIDE, PRESERVATIVE FREE 10 ML: 5 INJECTION INTRAVENOUS at 06:30

## 2022-02-16 RX ADMIN — APIXABAN 5 MG: 5 TABLET, FILM COATED ORAL at 08:18

## 2022-02-16 NOTE — PROGRESS NOTES
Received report from Blair Gann, [facility]. Patient awake resting in bed. Respirations present. On 5 L HF NC. No signs of distress. AxO X4. No needs expressed. Bed low and locked. Call light within reach. Will continue to monitor.

## 2022-02-16 NOTE — PROGRESS NOTES
ACUTE PHYSICAL THERAPY GOALS:  (Developed with and agreed upon by patient and/or caregiver. )  LTG:  (1.)Ms. Fitzgerald will move from supine to sit and sit to supine , scoot up and down and roll side to side in flat bed without siderails with  INDEPENDENT within 7 day(s).    (2.)Ms. Fitzgerald will perform all functional transfers with  MODIFIED INDEPENDENCE using the least restrictive/no device within 7 day(s).    (3.)Ms. Fitzgerald will ambulate with  independently with rolling walker  for 100+ feet with normal vital sign response  within 7 day(s). PHYSICAL THERAPY: Daily Note and AM Treatment Day # 5    Estrella Baugh is a 80 y.o. female   PRIMARY DIAGNOSIS: Acute hypoxemic respiratory failure due to COVID-19 [facility])  [facility] [U07.1, J96.01]         ASSESSMENT:     REHAB RECOMMENDATIONS: CURRENT LEVEL OF FUNCTION:  (Most Recently Demonstrated)   Recommendation to date pending progress:  [address] [place] [address]  Equipment:    To Be Determined Bed Mobility:   Supervision  Sit to Stand:   Supervision  Transfers:   Supervision  Gait/Mobility:   Supervision     ASSESSMENT:  Ms. Palak Manzo was up at sink brushing her teeth on arrival and agreeable to therapy. This session, pt cont to demonstrate good progress toward goals as she made further improvements in activity tolerance without requiring inc level of (A). As in previous sessions, pt was on 5L HFNC throughout duration of session and she tolerated all activity well reporting only minimal SOB upon completion of ambulation. Of note, pt's continuous pulse-ox repeatedly reads in 70-80% range during mobility d/t interference with line however it returns to 93-99% range almost immediately upon cessation of activity. While moving about, pt cont to show c dec renetta and shuffling nature however she remains steady within RW and once again did not require physical (A) from staff at any point.  Following ambulation, pt performed multiple sets/reps of BLE exercises at bedside. Pt reporting she feels that she is improving and she has no concerns regarding home DC with family (A). Overall, pt continuing to improve. Will continue to follow. SUBJECTIVE:   Ms. Mark Reddy been a wiggler my whole life\"     SOCIAL HISTORY/ LIVING ENVIRONMENT: Ms. Fitzgerald lives with her , son, and DIL. She ambulates independently and is primarily homebound.   Support Systems: Spouse/Significant Other,Child(syk)  OBJECTIVE:     PAIN: VITAL SIGNS: LINES/DRAINS:   Pre Treatment: Pain Screen  Pain Scale 1: Numeric (0 - 10)  Pain Intensity 1: 00  Post Treatment: No complaints Vital Signs  O2 Sat (%): 98 %  O2 Device: Hi flow nasal cannula  O2 Flow Rate (L/min): 5 l/min Continuous Pulse Oximetry  O2 Device: Hi flow nasal cannula     MOBILITY: I Mod I S SBA CGA Min Mod Max Total  NT x2 Comments:   Bed Mobility    Rolling [] [] [] [] [] [] [] [] [] [] []    Supine to Sit [] [] [x] [] [] [] [] [] [] [] []    Scooting [] [] [x] [] [] [] [] [] [] [] []    Sit to Supine [] [] [x] [] [] [] [] [] [] [] []    Transfers    Sit to Stand [] [] [x] [] [] [] [] [] [] [] []    Bed to Chair [] [] [] [] [] [] [] [] [] [] []    Stand to Sit [] [] [x] [] [] [] [] [] [] [] []    I=Independent, Mod I=Modified Independent, S=Supervision, SBA=Standby Assistance, CGA=Contact Guard Assistance,   Min=Minimal Assistance, Mod=Moderate Assistance, Max=Maximal Assistance, Total=Total Assistance, NT=Not Tested    BALANCE: Good Fair+ Fair Fair- Poor NT Comments   Sitting Static [x] [] [] [] [] []    Sitting Dynamic [] [x] [] [] [] []              Standing Static [x] [] [] [] [] [] Within RW   Standing Dynamic [] [x] [] [] [] []      GAIT: I Mod I S SBA CGA Min Mod Max Total  NT x2 Comments:   Level of Assistance [] [] [x] [] [] [] [] [] [] [] []    Distance 60'x2    DME Rolling Walker     Gait Quality Slow short steps    Weightbearing  Status N/A     I=Independent, Mod I=Modified Independent, S=Supervision, SBA=Standby Assistance, CGA=Contact Guard Assistance,   Min=Minimal Assistance, Mod=Moderate Assistance, Max=Maximal Assistance, Total=Total Assistance, NT=Not Tested    PLAN:   FREQUENCY/DURATION: PT Plan of Care: 3 times/week for duration of hospital stay or until stated goals are met, whichever comes first.  TREATMENT:     TREATMENT:   ($$ Therapeutic Activity: 23-37 mins    )  Therapeutic Activity (25 Minutes): Therapeutic activity included Supine to Sit, Sit to Supine, Scooting, Ambulation on level ground, Sitting balance  and Standing balance to improve functional Mobility, Strength and Activity tolerance.     TREATMENT GRID:   Date:  2/16 Date:   Date:     Activity/Exercise Parameters Parameters Parameters   LAQ 2x15 JANNETH     Hip Flexion x10 JANNETH     APs 2x20 JANNETH                                   AFTER TREATMENT POSITION/PRECAUTIONS:  Bed, Needs within reach and RN notified    INTERDISCIPLINARY COLLABORATION:  RN/PCT, PT/PTA and OT/ROMERO    TOTAL TREATMENT DURATION:  PT Patient Time In/Time Out  Time In: 0950  Time Out: [address] [facility] PT

## 2022-02-16 NOTE — PROGRESS NOTES
Patient discharged. All discharge instructions provided to patient. Sybil isaacs4. No questions at this time. On 5 L HF NC on discharge. No signs of distress. Discharged with all patient belongings. Discharged via wheelchair by this RN.

## 2022-02-16 NOTE — PROGRESS NOTES
Pt is sitting on side of the bed at this time. Pt is on 5L NC. Pt has no s/sx of acute distress at this time. Pt safety measures in place.  Report given to [facility]

## 2022-02-16 NOTE — DISCHARGE INSTRUCTIONS
Patient Education        Nadine Jiménezwendy 27 After Treatment for COVID-19: Care Instructions  Overview     You are being sent home from the hospital after being treated for COVID-19. Being in the hospital can be hard, especially if you've been in the intensive care unit (ICU). Even though you're going home, you probably don't feel well yet. Healing from COVID-19 takes time. You may feel very tired for weeks or months afterward, especially if you were on a ventilator. It will take time to get back to your old level of activity. Some people may have long-lasting health problems. But most people can look forward to feeling a little better every day. If you were on a ventilator, your throat may be sore and your voice hoarse or raspy for a while. After leaving the hospital, some people have feelings of anxiety and depression. They may have nightmares. Or in their mind they may relive events that happened in the hospital (flashbacks). Reach out to your doctor if you're having trouble with these symptoms. Your doctor will tell you if you need to isolate yourself at home, and when you can end isolation. How can you self-isolate when you have COVID-19? If you have COVID-19, there are things you can do to help avoid spreading the virus to others. · Limit contact with people in your home. If possible, stay in a separate bedroom and use a separate bathroom. · Wear a mask when you are around other people. · If you have to leave home, avoid crowds and try to stay at least 6 feet away from other people. · Avoid contact with pets and other animals. · Cover your mouth and nose with a tissue when you cough or sneeze. Then throw it in the trash right away. · Wash your hands often, especially after you cough or sneeze. Use soap and water, and scrub for at least 20 seconds. If soap and water aren't available, use an alcohol-based hand . · Don't share personal household items.  These include bedding, towels, cups and glasses, and eating utensils. · [address] [facility] [address] in the warmest water allowed for the fabric type, and dry it completely. It's okay to wash other people's laundry with yours. · Clean and disinfect your home. Use household  and disinfectant wipes or sprays. Follow-up care is a key part of your treatment and safety. Be sure to make and go to all appointments, and call your doctor if you are having problems. It's also a good idea to know your test results and keep a list of the medicines you take. How can you care for yourself at home? · Get plenty of rest. It can help you feel better. · Be kind to yourself if it's taking longer than you expected to feel better. You've been through a stressful time. · Get up and walk around every hour or two while you're resting. Slowly increase your activity as you start to feel better. · Eat healthy foods. · Drink plenty of fluids. If you have kidney, heart, or liver disease and have to limit fluids, talk with your doctor before you increase the amount of fluids you drink. · If needed, take acetaminophen (Tylenol) or ibuprofen (Advil, Motrin) to reduce a fever. It may also help with muscle aches. Read and follow all instructions on the label. When should you call for help? Call 911 anytime you think you may need emergency care. For example, call if you have life-threatening symptoms, such as:    · You have severe trouble breathing. (You can't talk at all.)     · You have constant chest pain or pressure.     · You are severely dizzy or lightheaded.     · You are confused or can't think clearly.     · You have pale, gray, or blue-colored skin or lips.     · You pass out (lose consciousness) or are very hard to wake up. Call your doctor now or seek immediate medical care if:    · You have moderate trouble breathing. (You can't speak a full sentence.)     · You are coughing up blood (more than about 1 teaspoon).     · You have signs of low blood pressure.  These include feeling lightheaded; being too weak to stand; and having cold, pale, clammy skin. Watch closely for changes in your health, and be sure to contact your doctor if:    · Your symptoms get worse.     · You are not getting better as expected.     · You have new or worse symptoms of anxiety, depression, nightmares, or flashbacks. Call before you go to the doctor's office. Follow their instructions. And wear a mask. Current as of: July 1, 2021               Content Version: 13.0  © 2006-2021 [employer]. Care instructions adapted under license by [employer] (which disclaims liability or warranty for this information). If you have questions about a medical condition or this instruction, always ask your healthcare professional. [address] any warranty or liability for your use of this information.

## 2022-02-16 NOTE — PROGRESS NOTES
Oxygen Qualifier       Room air: SpO2 with O2 and liter flow   Resting SpO2       Ambulating SpO2     3L 87%, 4L 89-90%, 5l 92%     PLACED ON RA DURING YESTERDAYS WALK TEST. Completed by:     Chantel Warren RT

## 2022-02-16 NOTE — DISCHARGE SUMMARY
Hospitalist Discharge Summary   Admit Date:  2022 12:32 PM   DC Note date: 2022  Name:  Scott Oquendo   Age:  80 y.o.   Sex:  female  :  1935   MRN:  [id number]   Room:  [facility]  PCP:  Marcel Manuel MD    Presenting Complaint: Positive For Covid-19    Initial Admission Diagnosis: Acute hypoxemic respiratory failure due to COVID-19 ([facility] [U07.1, J96.01]     Problem List for this Hospitalization:  Hospital Problems as of 2022 Date Reviewed: 2022          Codes Class Noted - Resolved POA    Secondary adrenal insufficiency ([facility] ICD-10-CM: E27.49  ICD-9-CM: 255.41  2022 - Present No        Atrial fibrillation with RVR ([facility] ICD-10-CM: I48.91  ICD-9-CM: 427.31  2022 - Present No        Hypotension ICD-10-CM: I95.9  ICD-9-CM: 458.9  2022 - Present Unknown        E-coli UTI ICD-10-CM: N39.0, B96.20  ICD-9-CM: 599.0, 041.49  2/3/2022 - Present Yes        * (Principal) Acute hypoxemic respiratory failure due to COVID-19 [facility]) ICD-10-CM: U07.1, J96.01  ICD-9-CM: 518.81, 079.89, 799.02  2022 - Present Yes        Sepsis due to COVID-19 [facility]) ICD-10-CM: U07.1, A41.89  ICD-9-CM: 079.89, 995.91  2022 - Present Yes        Hypomagnesemia ICD-10-CM: E83.42  ICD-9-CM: 275.2  2022 - Present Yes        Hypokalemia ICD-10-CM: E87.6  ICD-9-CM: 276.8  2022 - Present Yes        Prolonged Q-T interval on ECG ICD-10-CM: R94.31  ICD-9-CM: 794.31  2022 - Present Yes        Elevated troponin ICD-10-CM: R77.8  ICD-9-CM: 790.6  2022 - Present Yes        Acute metabolic encephalopathy JNI-34-DB: G93.41  ICD-9-CM: 348.31  2022 - Present Yes        Chronic prescription benzodiazepine use ICD-10-CM: Z79.899  ICD-9-CM: V58.69  2022 - Present Yes        Hypercoagulable state associated with COVID-19 [facility]) ICD-10-CM: U07.1, D68.69  ICD-9-CM: 289.82, 079.89  2022 - Present Yes        Transaminitis ICD-10-CM: R74.01  ICD-9-CM: 790.4  2022 - Present Yes Gastroesophageal reflux disease without esophagitis ICD-10-CM: K21.9  ICD-9-CM: 530.81  9/28/2017 - Present Yes    Overview Signed 1/9/2018 10:12 AM by Stas Guerin DO     Last Assessment & Plan:   Stable. Essential (primary) hypertension ICD-10-CM: I10  ICD-9-CM: 401.9  10/28/2016 - Present Yes    Overview Signed 1/9/2018 10:12 AM by Stas Guerin DO     Last Assessment & Plan:   Stable. Hyperlipidemia ICD-10-CM: E78.5  ICD-9-CM: 272.4  10/28/2016 - Present Yes    Overview Signed 1/9/2018 10:12 AM by Stas Guerin DO     Last Assessment & Plan:   Return for fasting labs. Insomnia, persistent ICD-10-CM: G47.00  ICD-9-CM: 307.42  4/27/2016 - Present Yes    Overview Signed 1/9/2018 10:12 AM by Stas Guerin DO     Last Assessment & Plan:   Restoril prescription printed & given. Primary osteoarthritis involving multiple joints ICD-10-CM: M89.49  ICD-9-CM: 715.98  7/31/2015 - Present Yes    Overview Signed 1/9/2018 10:12 AM by Stas Guerin DO     Last Assessment & Plan:   3 Norco scripts printed with do not fill before dates of 10/10, 11/9, 12/9. Tramadol prescription printed & given (decreased from 150 to 120 tablets per month based on how patient is taking it). Aware of risk of sedation and aware to get prescriptions only in our office. Discussed polypharmacy & fall risk with patient's multiple sedating medications, encouraged to limit use as much as possible. Will have her see Dr. Contreras Due for next follow up to review pain medication regimen. Pt verbalized understanding, agreed with plan. Did Patient have Sepsis (YES OR NO): NO    HPI:   Homer Joseph is a 80 y.o. female with medical history of HTN, mixed HLD, GERD, Glaucoma, Hay fever,  HLD, insomnia who presented from home via EMS with hypoxia and AMS with recent diagnosis of COVID-19.  EMS reports patient was hypoxic on arrival, O2 saturation in the 60s.  Placed on 6 L and given terbutaline.  EMS also noted family some concern for patient being more altered than normal.     In ED, T102 BP 91/56 , spo2 74% RA, 85% 6L NC, 91% 55L/m. LA 3.9 CRP 17.3 D dimer 3.85 NT pro BNP 3481 HS trop 9081>2782 . CXR BL infiltrates   rec'd Tylenol 1000 mg and decadron 10 mg IV.     She is alert and oriented to person, place  but not month or year. Unable to get in touch with either son or . She notes symptom onset 5 days ago. Patient denies CP or palpitations, nausea, vomiting, diarrhea, fevers. +chills, +cough. Says  is sick. Discussed Actemra with patient including risks and benefits.      Satting 94% on 55/90% airvo. Hospital Course:  Ms. Lyle Ch is an 81 yo female with PMH of HTN, HLP, admitted with COVID 19 and acute hypoxic respiratory failure, initially required AIRO. S/p ACTEMRA 1/26/22 then was started on Baricitinib x 8 doses, last dose 2/8. Completed decadron. Oxygen demand improved to RA at rest and 4-5 L NC with ambulation. She had elevated troponin and new onset AFIB. Seen by cardiology. Added eliquis and BBlocker. Beta-blocker discontinued due to bradycardia and hypotension. She has been managed for hypotension with midodrine and suspected adrenal insufficiency with hydrocortisone. Patient needs outpatient follow-up with endocrinology. She has completed course of rocephin for E. coli UTI. She is also started on Lexapro for mood disorder 2/2 to general medical condition. All other chronic comorbidities stable and we continued essential home meds. Patient is stable on discharge, no new complaint. Oxygen qualifier showed patient requiring up to 5 L nasal cannula with ambulation. Case management consulted to arrange home health and home oxygen. Patient is stable to discharge home today with close follow-up with PCP, cardiology and endocrinology.   Oxygen Qualifier          Room air: SpO2 with O2 and liter flow   Resting SpO2       Ambulating SpO2     3L 87%, 4L 89-90%, 5l 92%      PLACED ON RA DURING YESTERDAYS WALK TEST. Disposition: Home Health Care [facility]  Diet: ADULT DIET Regular; Low Fat/Low Chol/High Fiber/EVA; no mash potatoes,eggs, garcia, carrots and green beans. does like noodles sweet potatoes  ADULT ORAL NUTRITION SUPPLEMENT Breakfast; Standard High Calorie/High Protein  Code Status: Full Code    Follow Up Orders: Follow-up Appointments   Procedures    FOLLOW UP VISIT Appointment in: 3 - 5 Days PCP Cardiology in 2-3 weeks Endocrinology in 1-2 weeks     PCP  Cardiology in 2-3 weeks  Endocrinology in 1-2 weeks     Standing Status:   Standing     Number of Occurrences:   1     Order Specific Question:   Appointment in     Answer:   3 - 5 Days       Follow-up Information     Follow up With Specialties Details Why Emmy Knight MD    Patient can only remember the practice name and not the physician            Follow up labs/diagnostics (ultimately defer to outpatient provider):  Endocrinology follow-up for suspected adrenal insufficiency  Started on midodrine for hypotension, wean as started    Time spent in patient discharge and coordination 36 minutes. Plan was discussed with patient. All questions answered. Patient was stable at time of discharge. Instructions given to call a physician or return if any concerns. Discharge Info:   Current Discharge Medication List      START taking these medications    Details   apixaban (ELIQUIS) 5 mg tablet Take 1 Tablet by mouth two (2) times a day for 30 days. Qty: 60 Tablet, Refills: 0  Start date: 2/16/2022, End date: 3/18/2022      escitalopram oxalate (LEXAPRO) 10 mg tablet Take 1 Tablet by mouth every evening for 30 days. Qty: 30 Tablet, Refills: 0  Start date: 2/16/2022, End date: 3/18/2022      !! hydrocortisone (CORTEF) 10 mg tablet Take 1 Tablet by mouth Daily (before breakfast) for 30 days.   Qty: 30 Tablet, Refills: 0  Start date: 2/17/2022, End date: 3/19/2022      !! hydrocortisone (CORTEF) 5 mg tablet Take 1 Tablet by mouth Daily (before dinner) for 30 days. Qty: 30 Tablet, Refills: 0  Start date: 2/16/2022, End date: 3/18/2022      midodrine (PROAMATINE) 2.5 mg tablet Take 1 Tablet by mouth three (3) times daily (with meals) for 30 days. Qty: 90 Tablet, Refills: 0  Start date: 2/16/2022, End date: 3/18/2022       !! - Potential duplicate medications found. Please discuss with provider. CONTINUE these medications which have NOT CHANGED    Details   dexlansoprazole (Dexilant) 60 mg CpDB capsule (delayed release) Take 60 mg by mouth Daily (before breakfast). simvastatin (ZOCOR) 20 mg tablet Take 1 Tab by mouth nightly. Qty: 90 Tab, Refills: 1    Associated Diagnoses: Hyperlipidemia, unspecified hyperlipidemia type      garlic 5,683 mg cap Take  by mouth.      b complex vitamins tablet Take 1 Tab by mouth daily. traMADol (ULTRAM) 50 mg tablet Take 2 Tabs by mouth every eight (8) hours as needed. Max Daily Amount: 300 mg. Qty: 180 Tab, Refills: 3    Associated Diagnoses: Chronic neck and back pain; Primary osteoarthritis involving multiple joints      gabapentin (NEURONTIN) 800 mg tablet Take 1 Tab by mouth two (2) times a day. Qty: 180 Tab, Refills: 1    Associated Diagnoses: Chronic neck and back pain      cinnamon bark (CINNAMON) 500 mg cap Take 500 mg by mouth two (2) times a day. fish oil-omega-3 fatty acids (FISH OIL) 340-1,000 mg capsule Take 1 Cap by mouth daily. Indications: 1200 mg      calcium-cholecalciferol, d3, (CALCIUM 600 + D) 600-125 mg-unit tab Take  by mouth. aspirin delayed-release 81 mg tablet Take  by mouth daily. Alpha Lipoic Acid 600 mg cap Take  by mouth daily. potassium 99 mg tablet Take 99 mg by mouth daily. coenzyme q10-vitamin e (COQ10 ) 100-100 mg-unit cap Take  by mouth daily. ginkgo biloba (GINKGO) 60 mg Tab Take 120 mg by mouth daily.       timolol (TIMOPTIC) 0.5 % ophthalmic solution Administer 1 Drop to left eye daily. Take DOS per anesthesia protocol. multivitamin (ONE A DAY) tablet Take 1 Tab by mouth daily. pyridoxine (VITAMIN B-6) 200 mg tablet Take 500 mg by mouth daily. sodium chloride (SALINEX) 0.4 % nasal spray 1 Spray as needed. STOP taking these medications       hydroCHLOROthiazide (HYDRODIURIL) 25 mg tablet Comments:   Reason for Stopping:         NIFEdipine ER (PROCARDIA XL) 30 mg ER tablet Comments:   Reason for Stopping:               Procedures done this admission:  * No surgery found *    Consults this admission:  IP CONSULT TO CARDIOLOGY    Echocardiogram/EKG results:  Results from Hospital Encounter encounter on 01/26/22    ECHO ADULT COMPLETE    Interpretation Summary    Left Ventricle: Left ventricle size is normal. Normal wall thickness. Normal wall motion. Normal left ventricular systolic function with a visually estimated EF of 60 - 65%. Normal diastolic function.   Aortic Valve: Trace transvalvular regurgitation.   Mitral Valve: Mild transvalvular regurgitation.   Tricuspid Valve: Mild transvalvular regurgitation. Normal RVSP. EKG Results     Procedure 720 Value Units Date/Time    EKG, 12 LEAD, SUBSEQUENT [[id number]] Collected: 02/10/22 2029    Order Status: Completed Updated: 02/11/22 6759     Ventricular Rate 56 BPM      Atrial Rate 56 BPM      P-R Interval 144 ms      QRS Duration 92 ms      Q-T Interval 408 ms      QTC Calculation (Bezet) 393 ms      Calculated P Axis 41 degrees      Calculated R Axis -10 degrees      Calculated T Axis 10 degrees      Diagnosis --     Sinus bradycardia  Minimal voltage criteria for LVH, may be normal variant  Nonspecific T wave abnormality  Abnormal ECG  When compared with ECG of 29-JAN-2022 13:38,  Sinus rhythm has replaced Atrial fibrillation  Vent.  rate has decreased  BPM  ST no longer depressed in Anterolateral leads  T wave inversion less evident in Inferior leads  T wave inversion no longer evident in Lateral leads  Confirmed by [facility]  MD ()JACKELINE ([id number]) on 2/11/2022 6:23:13 AM      EKG, 12 LEAD, SUBSEQUENT [[id number]] Collected: 01/29/22 1338    Order Status: Completed Updated: 01/29/22 1605     Ventricular Rate 157 BPM      Atrial Rate 174 BPM      QRS Duration 78 ms      Q-T Interval 358 ms      QTC Calculation (Bezet) 578 ms      Calculated R Axis -23 degrees      Calculated T Axis -82 degrees      Diagnosis --     Atrial fibrillation with rapid ventricular response  Minimal voltage criteria for LVH, may be normal variant  Non-specific ST-t wave changes  Abnormal ECG  When compared with ECG of 26-JAN-2022 12:51,  PREVIOUS ECG IS PRESENT  Confirmed by ADELA WORTHINGTON ()BERNADETTE ([id number]) on 1/29/2022 4:05:08 PM      EKG, 12 LEAD, INITIAL [[id number]]     Order Status: Canceled     EKG, 12 LEAD, SUBSEQUENT [[id number]]     Order Status: Completed     EKG [[id number]] Collected: 01/26/22 1251    Order Status: Completed Updated: 01/26/22 1524     Ventricular Rate 115 BPM      Atrial Rate 116 BPM      P-R Interval 143 ms      QRS Duration 92 ms      Q-T Interval 355 ms      QTC Calculation (Bezet) 491 ms      Calculated P Axis 49 degrees      Calculated R Axis -23 degrees      Calculated T Axis 46 degrees      Diagnosis --     Sinus tachycardia  Borderline left axis deviation  Low voltage, precordial leads  Abnormal R-wave progression, late transition  Borderline repol abnrm, anterolateral leads  Borderline prolonged QT interval    Confirmed by [facility]  MD ()Maryann ([id number]) on 1/26/2022 3:24:35 PM            Diagnostic Imaging/Tests:   ECHO ADULT COMPLETE    Result Date: 1/27/2022    Left Ventricle: Left ventricle size is normal. Normal wall thickness. Normal wall motion. Normal left ventricular systolic function with a visually estimated EF of 60 - 65%. Normal diastolic function.   Aortic Valve: Trace transvalvular regurgitation.   Mitral Valve: Mild transvalvular regurgitation.   Tricuspid Valve: Mild transvalvular regurgitation. Normal RVSP. All Micro Results     Procedure Component Value Units Date/Time    CULTURE, URINE [[id number]]  (Abnormal)  (Susceptibility) Collected: 01/28/22 1105    Order Status: Completed Specimen: Urine from Clean catch Updated: 01/30/22 0897     Special Requests: NO SPECIAL REQUESTS        Culture result:       >100,000 COLONIES/mL ESCHERICHIA COLI                  <1,000 CFU/ML MIXED SKIN SCOTT ISOLATED          COVID-19 RAPID TEST [[id number]]  (Abnormal) Collected: 01/26/22 1258    Order Status: Completed Specimen: Nasopharyngeal Updated: 01/26/22 1316     Specimen source NASAL        COVID-19 rapid test Detected        Comment:      The specimen is POSITIVE for SARS-CoV-2, the novel coronavirus associated with COVID-19. This test has been authorized by the FDA under an Emergency Use Authorization (EUA) for use by authorized laboratories. Fact sheet for Healthcare Providers: iTendency.uy  Fact sheet for Patients: iTendency.uy       Methodology: Isothermal Nucleic Acid Amplification               Labs: Results:       BMP, Mg, Phos Recent Labs     02/16/22  0447 02/15/22  0445 02/14/22  0348    143 141   K 4.3 3.6 4.4    107 105   CO2 35* 34* 36*   AGAP 1* 2* 0*   BUN 15 15 19   CREA 0.70 0.60 0.70   CA 8.9 8.7 8.9   GLU 94 87 104*   MG  --   --  2.0      CBC Recent Labs     02/16/22  0447 02/15/22  0445 02/14/22  0348   WBC 3.5* 3.8* 3.6*   RBC 3.29* 3.24* 3.41*   HGB 9.5* 9.5* 10.0*   HCT 30.6* 30.3* 31.9*   * 167 196      LFT No results for input(s): ALT, TBIL, AP, TP, ALB, GLOB, AGRAT in the last 72 hours.     No lab exists for component: SGOT, GPT   Cardiac Testing Lab Results   Component Value Date/Time    CK 42 01/29/2022 03:19 PM      Coagulation Tests Lab Results   Component Value Date/Time    Prothrombin time 10.7 06/25/2013 12:30 PM    INR 1.0 06/25/2013 12:30 PM    aPTT 27.2 06/25/2013 12:30 PM      A1c No results found for: HBA1C, JCU2EXSL, KDA9GVWP   Lipid Panel Lab Results   Component Value Date/Time    Cholesterol, total 184 04/12/2018 02:34 PM    HDL Cholesterol 71 04/12/2018 02:34 PM    LDL, calculated 84 04/12/2018 02:34 PM    VLDL, calculated 29 04/12/2018 02:34 PM    Triglyceride 144 04/12/2018 02:34 PM      Thyroid Panel Lab Results   Component Value Date/Time    TSH 1.480 02/14/2022 03:48 AM    TSH 1.740 04/12/2018 02:34 PM        Most Recent UA Lab Results   Component Value Date/Time    Color ILEANA 01/28/2022 11:05 AM    Appearance CLOUDY 01/28/2022 11:05 AM    Specific gravity 1.007 06/25/2013 12:30 PM    pH (UA) 6.0 01/28/2022 11:05 AM    Protein 100 (A) 01/28/2022 11:05 AM    Glucose Negative 01/28/2022 11:05 AM    Ketone 40 (A) 01/28/2022 11:05 AM    Bilirubin SMALL (A) 01/28/2022 11:05 AM    Blood Negative 01/28/2022 11:05 AM    Urobilinogen 0.2 01/28/2022 11:05 AM    Nitrites Negative 01/28/2022 11:05 AM    Leukocyte Esterase Negative 01/28/2022 11:05 AM    WBC 3-5 01/28/2022 11:05 AM    RBC 0-3 01/28/2022 11:05 AM    Epithelial cells 0-3 01/28/2022 11:05 AM    Bacteria 4+ (H) 01/28/2022 11:05 AM    Other observations RESULTS VERIFIED MANUALLY 01/28/2022 11:05 AM          All Labs from Last 24 Hrs:  Recent Results (from the past 24 hour(s))   CBC W/O DIFF    Collection Time: 02/16/22  4:47 AM   Result Value Ref Range    WBC 3.5 (L) 4.3 - 11.1 K/uL    RBC 3.29 (L) 4.05 - 5.2 M/uL    HGB 9.5 (L) 11.7 - 15.4 g/dL    HCT 30.6 (L) 35.8 - 46.3 %    MCV 93.0 79.6 - 97.8 FL    MCH 28.9 26.1 - 32.9 PG    MCHC 31.0 (L) 31.4 - 35.0 g/dL    RDW 15.1 (H) 11.9 - 14.6 %    PLATELET 533 (L) 214 - 450 K/uL    MPV 10.9 9.4 - 12.3 FL    ABSOLUTE NRBC 0.00 0.0 - 0.2 K/uL   METABOLIC PANEL, BASIC    Collection Time: 02/16/22  4:47 AM   Result Value Ref Range    Sodium 143 136 - 145 mmol/L    Potassium 4.3 3.5 - 5.1 mmol/L    Chloride 107 98 - 107 mmol/L    CO2 35 (H) 21 - 32 mmol/L    Anion gap 1 (L) 7 - 16 mmol/L    Glucose 94 65 - 100 mg/dL    BUN 15 8 - 23 MG/DL    Creatinine 0.70 0.6 - 1.0 MG/DL    GFR est AA >60 >60 ml/min/1.73m2    GFR est non-AA >60 >60 ml/min/1.73m2    Calcium 8.9 8.3 - 10.4 MG/DL       Current Med List in Hospital:   Current Facility-Administered Medications   Medication Dose Route Frequency    hydrocortisone (CORTEF) tablet 10 mg  10 mg Oral ACB    And    hydrocortisone (CORTEF) tablet 5 mg  5 mg Oral ACD    midodrine (PROAMATINE) tablet 2.5 mg  2.5 mg Oral TID WITH MEALS    escitalopram oxalate (LEXAPRO) tablet 10 mg  10 mg Oral QPM    temazepam (RESTORIL) capsule 30 mg  30 mg Oral QHS    lidocaine 4 % patch 1 Patch  1 Patch TransDERmal Q24H    gabapentin (NEURONTIN) capsule 800 mg  800 mg Oral BID    lip protectant (BLISTEX) ointment 1 Each  1 Each Topical PRN    apixaban (ELIQUIS) tablet 5 mg  5 mg Oral BID    traMADoL (ULTRAM) tablet 100 mg  100 mg Oral Q8H PRN    dexlansoprazole (DEXILANT) capsule (delayed release) CpDB 60 mg (Patient Supplied)  60 mg Oral DAILY    sodium chloride (NS) flush 5-10 mL  5-10 mL IntraVENous Q8H    sodium chloride (NS) flush 5-10 mL  5-10 mL IntraVENous PRN    sodium chloride (NS) flush 5-40 mL  5-40 mL IntraVENous Q8H    sodium chloride (NS) flush 5-40 mL  5-40 mL IntraVENous PRN    acetaminophen (TYLENOL) tablet 650 mg  650 mg Oral Q6H PRN    Or    acetaminophen (TYLENOL) suppository 650 mg  650 mg Rectal Q6H PRN    polyethylene glycol (MIRALAX) packet 17 g  17 g Oral DAILY PRN    ondansetron (ZOFRAN ODT) tablet 4 mg  4 mg Oral Q8H PRN    Or    ondansetron (ZOFRAN) injection 4 mg  4 mg IntraVENous Q6H PRN    guaiFENesin-dextromethorphan (ROBITUSSIN DM) 100-10 mg/5 mL syrup 5 mL  5 mL Oral Q4H PRN    cholecalciferol (VITAMIN D3) (1000 Units /25 mcg) tablet 2,000 Units  2,000 Units Oral DAILY    alum-mag hydroxide-simeth (MYLANTA) oral suspension 15 mL  15 mL Oral Q6H PRN    albuterol (PROVENTIL HFA, VENTOLIN HFA, PROAIR HFA) inhaler 2 Puff  2 Puff Inhalation Q4H PRN    atorvastatin (LIPITOR) tablet 40 mg  40 mg Oral QHS       Allergies   Allergen Reactions    Sulfa (Sulfonamide Antibiotics) Rash     Immunization History   Administered Date(s) Administered    Influenza High Dose Vaccine PF 12/04/2012, 10/28/2016    Influenza Vaccine 10/06/2008    Influenza Vaccine (Quad) PF (>6 Mo Flulaval, Fluarix, and >3 Yrs Afluria, Fluzone 88089) 10/28/2015    Pneumococcal Conjugate (PCV-13) 10/28/2015    Pneumococcal Polysaccharide (PPSV-23) 09/28/2017    TB Skin Test (PPD) Intradermal 07/08/2008, 07/13/2013, 01/26/2022       Recent Vital Data:  Patient Vitals for the past 24 hrs:   Temp Pulse Resp BP SpO2   02/16/22 1053 98.3 °F (36.8 °C) 82 19 100/64 98 %   02/16/22 0950 -- -- -- -- 98 %   02/16/22 0731 98 °F (36.7 °C) 72 19 117/61 96 %   02/16/22 0431 98.3 °F (36.8 °C) (!) 59 19 112/66 96 %   02/16/22 0014 97.9 °F (36.6 °C) 62 18 98/63 95 %   02/15/22 1520 -- (!) 56 19 133/64 99 %     Oxygen Therapy  O2 Sat (%): 98 % (02/16/22 1053)  Pulse via Oximetry: 62 beats per minute (02/13/22 2122)  O2 Device: Hi flow nasal cannula (02/16/22 0950)  Skin Assessment: Clean, dry, & intact (02/10/22 0930)  Skin Protection for O2 Device: No (02/10/22 0930)  O2 Flow Rate (L/min): 5 l/min (02/16/22 0950)  O2 Temperature: 87.8 °F (31 °C) (02/10/22 0930)  FIO2 (%): 45 % (02/11/22 1137)    Estimated body mass index is 25.92 kg/m² as calculated from the following:    Height as of this encounter: 5' 1\" (1.549 m). Weight as of this encounter: 62.2 kg (137 lb 3.2 oz). Intake/Output Summary (Last 24 hours) at 2/16/2022 1252  Last data filed at 2/16/2022 1001  Gross per 24 hour   Intake 420 ml   Output --   Net 420 ml         Physical Exam:  General:    No overt distress  Head:  Normocephalic, atraumatic  Eyes:  Sclerae appear normal.  Pupils equally round. HENT:  Nares appear normal, no drainage.   Moist mucous membranes  Neck:  No restricted ROM. Trachea midline  CV:   RRR. No m/r/g. No JVD  Lungs:   CTAB. No wheezing, rhonchi, or rales. Even, unlabored  Abdomen:   Soft, nontender, nondistended. Extremities: Warm and dry. No cyanosis or clubbing. No edema. Skin:     No rashes. Normal coloration  Neuro:  CN II-XII grossly intact. Psych:  Normal mood and affect. Signed:  Maia Lindsey MD    Part of this note may have been written by using a voice dictation software. The note has been proof read but may still contain some grammatical/other typographical errors.

## 2022-02-16 NOTE — PROGRESS NOTES
Care Management Interventions  PCP Verified by CM: Yes  Transition of Care Consult (CM Consult): [address]: Yes  Physical Therapy Consult: Yes  Occupational Therapy Consult: Yes  Speech Therapy Consult: No  Support Systems: Child(sky)  Confirm Follow Up Transport: Family  The Plan for Transition of Care is Related to the Following Treatment Goals : HH  The Patient and/or Patient Representative was Provided with a Choice of Provider and Agrees with the Discharge Plan?: Yes  Name of the Patient Representative Who was Provided with a Choice of Provider and Agrees with the Discharge Plan: patient and son  Freedom of Choice List was Provided with Basic Dialogue that Supports the Patient's Individualized Plan of Care/Goals, Treatment Preferences and Shares the Quality Data Associated with the Providers?: Yes   Resource Information Provided?: No  Discharge Location  Patient Expects to be Discharged to[de-identified] Home with home health  Patient is discharging home with ZO Garcia RN/PT/OT. Patient qualified for home 02. CM delivered an 02 tank from [facility] Patient was also given a bsc. Patient and son are agreeable to this plan. Patient will follow up with Raymundo He NP at [facility].

## 2022-03-18 PROBLEM — N39.0 E-COLI UTI: Status: ACTIVE | Noted: 2022-02-03

## 2022-03-18 PROBLEM — B96.20 E-COLI UTI: Status: ACTIVE | Noted: 2022-02-03

## 2022-03-18 PROBLEM — I95.9 HYPOTENSION: Status: ACTIVE | Noted: 2022-02-09

## 2022-03-18 PROBLEM — R74.01 TRANSAMINITIS: Status: ACTIVE | Noted: 2022-01-26

## 2022-03-18 PROBLEM — D68.69 HYPERCOAGULABLE STATE ASSOCIATED WITH COVID-19 (HCC): Status: ACTIVE | Noted: 2022-01-26

## 2022-03-18 PROBLEM — U07.1 HYPERCOAGULABLE STATE ASSOCIATED WITH COVID-19 (HCC): Status: ACTIVE | Noted: 2022-01-26

## 2022-03-19 PROBLEM — U07.1 SEPSIS DUE TO COVID-19 (HCC): Status: ACTIVE | Noted: 2022-01-26

## 2022-03-19 PROBLEM — E87.6 HYPOKALEMIA: Status: ACTIVE | Noted: 2022-01-26

## 2022-03-19 PROBLEM — E83.42 HYPOMAGNESEMIA: Status: ACTIVE | Noted: 2022-01-26

## 2022-03-19 PROBLEM — A41.89 SEPSIS DUE TO COVID-19 (HCC): Status: ACTIVE | Noted: 2022-01-26

## 2022-03-19 PROBLEM — I48.91 ATRIAL FIBRILLATION WITH RVR (HCC): Status: ACTIVE | Noted: 2022-02-14

## 2022-03-19 PROBLEM — U07.1 ACUTE HYPOXEMIC RESPIRATORY FAILURE DUE TO COVID-19 (HCC): Status: ACTIVE | Noted: 2022-01-26

## 2022-03-19 PROBLEM — H35.30 MACULAR DEGENERATION OF LEFT EYE: Status: ACTIVE | Noted: 2018-01-09

## 2022-03-19 PROBLEM — E27.49 SECONDARY ADRENAL INSUFFICIENCY (HCC): Status: ACTIVE | Noted: 2022-02-14

## 2022-03-19 PROBLEM — H40.9 GLAUCOMA OF RIGHT EYE: Status: ACTIVE | Noted: 2018-01-09

## 2022-03-19 PROBLEM — J96.01 ACUTE HYPOXEMIC RESPIRATORY FAILURE DUE TO COVID-19 (HCC): Status: ACTIVE | Noted: 2022-01-26

## 2022-03-19 PROBLEM — K21.9 GASTROESOPHAGEAL REFLUX DISEASE WITHOUT ESOPHAGITIS: Status: ACTIVE | Noted: 2017-09-28

## 2022-03-19 PROBLEM — G93.41 ACUTE METABOLIC ENCEPHALOPATHY: Status: ACTIVE | Noted: 2022-01-26

## 2022-03-19 PROBLEM — K22.719 BARRETT'S ESOPHAGUS WITH DYSPLASIA: Status: ACTIVE | Noted: 2018-01-09

## 2022-03-20 PROBLEM — Z79.899 CHRONIC PRESCRIPTION BENZODIAZEPINE USE: Status: ACTIVE | Noted: 2022-01-26

## 2022-03-20 PROBLEM — R77.8 ELEVATED TROPONIN: Status: ACTIVE | Noted: 2022-01-26

## 2022-03-20 PROBLEM — R79.89 ELEVATED TROPONIN: Status: ACTIVE | Noted: 2022-01-26

## 2022-03-20 PROBLEM — R94.31 PROLONGED Q-T INTERVAL ON ECG: Status: ACTIVE | Noted: 2022-01-26

## 2023-09-15 ENCOUNTER — HOSPITAL ENCOUNTER (OUTPATIENT)
Dept: GENERAL RADIOLOGY | Age: 88
End: 2023-09-15
Attending: INTERNAL MEDICINE
Payer: MEDICARE

## 2023-09-15 DIAGNOSIS — K44.9 DIAPHRAGMATIC HERNIA WITHOUT OBSTRUCTION OR GANGRENE: ICD-10-CM

## 2023-09-15 PROCEDURE — 74240 X-RAY XM UPR GI TRC 1CNTRST: CPT

## 2023-09-15 PROCEDURE — 2500000003 HC RX 250 WO HCPCS: Performed by: INTERNAL MEDICINE

## 2023-09-15 PROCEDURE — 6370000000 HC RX 637 (ALT 250 FOR IP): Performed by: INTERNAL MEDICINE

## 2023-09-15 RX ADMIN — ANTACID/ANTIFLATULENT 1 EACH: 380; 550; 10; 10 GRANULE, EFFERVESCENT ORAL at 10:11

## 2023-09-15 RX ADMIN — BARIUM SULFATE 50 ML: 0.6 SUSPENSION ORAL at 10:12

## 2023-09-15 RX ADMIN — BARIUM SULFATE 140 ML: 980 POWDER, FOR SUSPENSION ORAL at 10:11

## 2024-04-05 ENCOUNTER — OFFICE VISIT (OUTPATIENT)
Dept: ORTHOPEDIC SURGERY | Age: 89
End: 2024-04-05
Payer: MEDICARE

## 2024-04-05 DIAGNOSIS — M25.561 RIGHT KNEE PAIN, UNSPECIFIED CHRONICITY: Primary | ICD-10-CM

## 2024-04-05 DIAGNOSIS — Z96.652 HISTORY OF TOTAL KNEE ARTHROPLASTY, LEFT: ICD-10-CM

## 2024-04-05 DIAGNOSIS — M17.11 OSTEOARTHRITIS OF RIGHT KNEE, UNSPECIFIED OSTEOARTHRITIS TYPE: ICD-10-CM

## 2024-04-05 PROCEDURE — 1123F ACP DISCUSS/DSCN MKR DOCD: CPT | Performed by: PHYSICIAN ASSISTANT

## 2024-04-05 PROCEDURE — 99203 OFFICE O/P NEW LOW 30 MIN: CPT | Performed by: PHYSICIAN ASSISTANT
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
## 2024-04-29 NOTE — PROGRESS NOTES
Pt resting in bed. Pt on airvo at 50L 90% with at 95% at this time. Pt denies pain or distress at this time. Pt encouraged to call for assistance if needed call light in reach, will monitor. Negative

## 2024-05-14 ENCOUNTER — OFFICE VISIT (OUTPATIENT)
Dept: ORTHOPEDIC SURGERY | Age: 89
End: 2024-05-14
Payer: MEDICARE

## 2024-05-14 VITALS — HEIGHT: 61 IN | WEIGHT: 185 LBS | BODY MASS INDEX: 34.93 KG/M2

## 2024-05-14 DIAGNOSIS — M17.11 OSTEOARTHRITIS OF RIGHT KNEE, UNSPECIFIED OSTEOARTHRITIS TYPE: Primary | ICD-10-CM

## 2024-05-14 PROCEDURE — 99214 OFFICE O/P EST MOD 30 MIN: CPT | Performed by: ORTHOPAEDIC SURGERY

## 2024-05-14 PROCEDURE — 1123F ACP DISCUSS/DSCN MKR DOCD: CPT | Performed by: ORTHOPAEDIC SURGERY

## 2024-05-14 RX ORDER — OXYBUTYNIN CHLORIDE 5 MG/1
5 TABLET, EXTENDED RELEASE ORAL DAILY
COMMUNITY
Start: 2023-12-13

## 2024-05-14 RX ORDER — TIMOLO/BRIMON/DORZO/LATANOP/PF 0.5-.15-2%
DROPS OPHTHALMIC (EYE)
COMMUNITY
Start: 2022-02-17

## 2024-05-14 RX ORDER — TEMAZEPAM 30 MG/1
30 CAPSULE ORAL
COMMUNITY
Start: 2024-03-14

## 2024-05-14 RX ORDER — NIFEDIPINE 30 MG/1
30 TABLET, EXTENDED RELEASE ORAL DAILY
COMMUNITY

## 2024-05-14 RX ORDER — OMEPRAZOLE 20 MG/1
CAPSULE, DELAYED RELEASE ORAL
COMMUNITY

## 2024-05-14 RX ORDER — BENZONATATE 100 MG/1
100 CAPSULE ORAL 3 TIMES DAILY PRN
COMMUNITY
Start: 2019-11-27

## 2024-05-14 RX ORDER — DULOXETIN HYDROCHLORIDE 20 MG/1
20 CAPSULE, DELAYED RELEASE ORAL DAILY
COMMUNITY
Start: 2024-04-30

## 2024-05-14 RX ORDER — HYDROCHLOROTHIAZIDE 25 MG/1
25 TABLET ORAL DAILY
COMMUNITY

## 2024-05-14 RX ORDER — SIMVASTATIN 40 MG
40 TABLET ORAL EVERY EVENING
COMMUNITY
Start: 2024-02-23

## 2024-05-14 RX ORDER — UBIDECARENONE 100 MG
1 CAPSULE ORAL DAILY
COMMUNITY
Start: 2015-06-12

## 2024-05-14 NOTE — PROGRESS NOTES
MG extended release capsule Take 1 capsule by mouth daily 4/30/24  Yes Nahum Howell MD   diclofenac (VOLTAREN) 50 MG EC tablet Take 1 tablet by mouth 2 times daily 3/14/24  Yes Nahum Howell MD   coenzyme Q10 100 MG CAPS capsule Take 1 tablet by mouth daily 6/12/15  Yes Nahum Howell MD   benzonatate (TESSALON) 100 MG capsule Take 1 capsule by mouth 3 times daily as needed 11/27/19  Yes Nahum Howell MD   Timolol-Brimon-Dorzol-Latanopr 0.5-0.15-2 -0.005% SOLN 1 drops DAILY (route: ophthalmic (eye)) 2/17/22  Yes Nahum Howell MD   omeprazole (PRILOSEC) 20 MG delayed release capsule TAKE 1 CAPSULE BY MOUTH TWICE DAILY 30 MINUTES TO 1 HOUR PRIOR TO MEALS    Nahum Howell MD   NIFEdipine (PROCARDIA XL) 30 MG extended release tablet Take 1 tablet by mouth daily    Nahum Howell MD   hydroCHLOROthiazide (HYDRODIURIL) 25 MG tablet Take 1 tablet by mouth daily    Nahum Howell MD   GINKGO BILOBA EXTRACT PO Take 120 mg by mouth daily    Nahum Howell MD   Pyridoxine HCl (VITAMIN B-6 PO) 0 Tablet    Nahum Howell MD   Cyanocobalamin (VITAMIN B-12 CR PO) 0 Tablet    Nahum Howell MD   Multiple Vitamins-Minerals (OCUVITE PO) Take 1 capsule by mouth daily    Nahum Howell MD   Alpha-Lipoic Acid 600 MG CAPS Take by mouth daily    Automatic Reconciliation, Ar   aspirin 81 MG EC tablet Take by mouth daily    Automatic Reconciliation, Ar   Calcium Carbonate-Vitamin D (CALCIUM-VITAMIN D) 600-125 MG-UNIT TABS Take by mouth    Automatic Reconciliation, Ar   Cinnamon 500 MG CAPS Take 500 mg by mouth 2 times daily    Automatic Reconciliation, Ar   dexlansoprazole (DEXILANT) 60 MG CPDR delayed release capsule Take 60 mg by mouth every morning (before breakfast)    Automatic Reconciliation, Ar   gabapentin (NEURONTIN) 800 MG tablet Take 800 mg by mouth 2 times daily. 1/9/18   Automatic Reconciliation, Ar   potassium gluconate 550 mg tablet Take
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
## 2024-05-24 ENCOUNTER — PREP FOR PROCEDURE (OUTPATIENT)
Dept: ORTHOPEDIC SURGERY | Age: 89
End: 2024-05-24

## 2024-05-24 DIAGNOSIS — M17.11 ARTHRITIS OF RIGHT KNEE: Primary | ICD-10-CM

## 2024-05-24 RX ORDER — SODIUM CHLORIDE 0.9 % (FLUSH) 0.9 %
5-40 SYRINGE (ML) INJECTION PRN
Status: CANCELLED | OUTPATIENT
Start: 2024-05-24

## 2024-05-24 RX ORDER — SODIUM CHLORIDE 9 MG/ML
INJECTION, SOLUTION INTRAVENOUS PRN
Status: CANCELLED | OUTPATIENT
Start: 2024-05-24

## 2024-05-24 RX ORDER — ACETAMINOPHEN 325 MG/1
1000 TABLET ORAL ONCE
Status: CANCELLED | OUTPATIENT
Start: 2024-05-24 | End: 2024-05-24

## 2024-05-24 RX ORDER — SODIUM CHLORIDE 0.9 % (FLUSH) 0.9 %
5-40 SYRINGE (ML) INJECTION EVERY 12 HOURS SCHEDULED
Status: CANCELLED | OUTPATIENT
Start: 2024-05-24
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
## 2024-05-30 ENCOUNTER — HOSPITAL ENCOUNTER (OUTPATIENT)
Dept: SURGERY | Age: 89
Discharge: HOME | End: 2024-05-30
Payer: MEDICARE

## 2024-05-30 ENCOUNTER — HOSPITAL ENCOUNTER (OUTPATIENT)
Dept: REHABILITATION | Age: 89
End: 2024-05-30
Payer: MEDICARE

## 2024-05-30 VITALS
DIASTOLIC BLOOD PRESSURE: 85 MMHG | TEMPERATURE: 97.5 F | HEIGHT: 62 IN | HEART RATE: 85 BPM | BODY MASS INDEX: 32.04 KG/M2 | WEIGHT: 174.1 LBS | OXYGEN SATURATION: 92 % | SYSTOLIC BLOOD PRESSURE: 145 MMHG

## 2024-05-30 DIAGNOSIS — M17.11 ARTHRITIS OF RIGHT KNEE: ICD-10-CM

## 2024-05-30 LAB
ANION GAP SERPL CALC-SCNC: 13 MMOL/L (ref 9–18)
BASOPHILS # BLD: 0 K/UL (ref 0–0.2)
BASOPHILS NFR BLD: 1 % (ref 0–2)
BUN SERPL-MCNC: 12 MG/DL (ref 8–23)
CALCIUM SERPL-MCNC: 9.4 MG/DL (ref 8.8–10.2)
CHLORIDE SERPL-SCNC: 99 MMOL/L (ref 98–107)
CO2 SERPL-SCNC: 31 MMOL/L (ref 20–28)
CREAT SERPL-MCNC: 0.77 MG/DL (ref 0.6–1.1)
DIFFERENTIAL METHOD BLD: ABNORMAL
EKG ATRIAL RATE: 79 BPM
EKG DIAGNOSIS: NORMAL
EKG P AXIS: 51 DEGREES
EKG P-R INTERVAL: 148 MS
EKG Q-T INTERVAL: 404 MS
EKG QRS DURATION: 92 MS
EKG QTC CALCULATION (BAZETT): 463 MS
EKG R AXIS: 68 DEGREES
EKG T AXIS: 57 DEGREES
EKG VENTRICULAR RATE: 79 BPM
EOSINOPHIL # BLD: 0.1 K/UL (ref 0–0.8)
EOSINOPHIL NFR BLD: 1 % (ref 0.5–7.8)
ERYTHROCYTE [DISTWIDTH] IN BLOOD BY AUTOMATED COUNT: 15 % (ref 11.9–14.6)
EST. AVERAGE GLUCOSE BLD GHB EST-MCNC: 144 MG/DL
GLUCOSE SERPL-MCNC: 109 MG/DL (ref 70–99)
HBA1C MFR BLD: 6.6 % (ref 0–5.6)
HCT VFR BLD AUTO: 38.2 % (ref 35.8–46.3)
HGB BLD-MCNC: 11.8 G/DL (ref 11.7–15.4)
IMM GRANULOCYTES # BLD AUTO: 0 K/UL (ref 0–0.5)
IMM GRANULOCYTES NFR BLD AUTO: 0 % (ref 0–5)
INR PPP: 1.1
LYMPHOCYTES # BLD: 1.8 K/UL (ref 0.5–4.6)
LYMPHOCYTES NFR BLD: 28 % (ref 13–44)
MCH RBC QN AUTO: 27.1 PG (ref 26.1–32.9)
MCHC RBC AUTO-ENTMCNC: 30.9 G/DL (ref 31.4–35)
MCV RBC AUTO: 87.8 FL (ref 82–102)
MONOCYTES # BLD: 0.7 K/UL (ref 0.1–1.3)
MONOCYTES NFR BLD: 11 % (ref 4–12)
NEUTS SEG # BLD: 3.8 K/UL (ref 1.7–8.2)
NEUTS SEG NFR BLD: 59 % (ref 43–78)
NRBC # BLD: 0 K/UL (ref 0–0.2)
PLATELET # BLD AUTO: 247 K/UL (ref 150–450)
PMV BLD AUTO: 10.6 FL (ref 9.4–12.3)
POTASSIUM SERPL-SCNC: 3.1 MMOL/L (ref 3.5–5.1)
PROTHROMBIN TIME: 13.5 SEC (ref 11.3–14.9)
RBC # BLD AUTO: 4.35 M/UL (ref 4.05–5.2)
SODIUM SERPL-SCNC: 143 MMOL/L (ref 136–145)
WBC # BLD AUTO: 6.4 K/UL (ref 4.3–11.1)

## 2024-05-30 PROCEDURE — 93010 ELECTROCARDIOGRAM REPORT: CPT | Performed by: INTERNAL MEDICINE

## 2024-05-30 PROCEDURE — 87641 MR-STAPH DNA AMP PROBE: CPT

## 2024-05-30 PROCEDURE — 83036 HEMOGLOBIN GLYCOSYLATED A1C: CPT

## 2024-05-30 PROCEDURE — 97161 PT EVAL LOW COMPLEX 20 MIN: CPT

## 2024-05-30 PROCEDURE — 93005 ELECTROCARDIOGRAM TRACING: CPT

## 2024-05-30 PROCEDURE — 94760 N-INVAS EAR/PLS OXIMETRY 1: CPT

## 2024-05-30 PROCEDURE — 85025 COMPLETE CBC W/AUTO DIFF WBC: CPT

## 2024-05-30 PROCEDURE — 80048 BASIC METABOLIC PNL TOTAL CA: CPT

## 2024-05-30 PROCEDURE — 85610 PROTHROMBIN TIME: CPT

## 2024-05-30 ASSESSMENT — KOOS JR
TWISING OR PIVOTING ON KNEE: MODERATE
STANDING UPRIGHT: SEVERE
HOW SEVERE IS YOUR KNEE STIFFNESS AFTER FIRST WAKING IN MORNING: MODERATE
STRAIGHTENING KNEE FULLY: MODERATE
RISING FROM SITTING: SEVERE
GOING UP OR DOWN STAIRS: SEVERE
KOOS JR TOTAL INTERVAL SCORE: 42.281
BENDING TO THE FLOOR TO PICK UP OBJECT: SEVERE

## 2024-05-30 ASSESSMENT — PAIN SCALES - GENERAL: PAINLEVEL_OUTOF10: 7

## 2024-05-30 ASSESSMENT — PULMONARY FUNCTION TESTS
FEV1 (LITERS): 1.36
FEV1 (%PREDICTED): 97

## 2024-05-30 ASSESSMENT — PAIN DESCRIPTION - ORIENTATION: ORIENTATION: RIGHT;ANTERIOR;INNER;OUTER

## 2024-05-30 ASSESSMENT — PAIN DESCRIPTION - LOCATION: LOCATION: KNEE

## 2024-05-30 ASSESSMENT — PAIN DESCRIPTION - DESCRIPTORS: DESCRIPTORS: SHARP;SHOOTING

## 2024-05-30 NOTE — PROGRESS NOTES
Debi Quiroz  : 1935  Primary: [employer]advantage Ppo  Secondary:  [facility] [place] [address]  [phone]      Physical Therapy Prehab Evaluation Summary:2024   Time In/Out   PT Charge Capture  Episode     MEDICAL/REFERRING DIAGNOSIS: Unilateral primary osteoarthritis, right knee [M17.11]  REFERRING PHYSICIAN: Thomas Goins MD    Treatment Diagnosis:   Pain in Right Knee (M25.561)  Stiffness of Right Knee, Not elsewhere classified (M25.661)    DATE OF SURGERY: 24  Assessment:   COMMENTS:  Ms. Quiroz is present for a Prehab Physical Therapy Assessment for their upcoming right TKA. They are here with her son . After discussing the surgical admission options and discharge plans, they are planning on discharging after one night in the hospital.    She is here with her son whom she lives with. She will go home on the day after surgery. She had a L TKA with Dr Parrish. She has a RW, cane, shower chair and BSC to use at OR.    PROBLEM LIST:   (Impacting functional limitations):  Ms. Quiroz presents with the following lower extremity(s) problems:  Strength  Range of Motion  Home Exercise Program  Pain INTERVENTIONS PLANNED:   (Benefits and precautions of physical therapy have been discussed with the patient.)  Home Exercise Program  Educational Discussion       GOALS: (Goals have been discussed and agreed upon with patient.)  Discharge Goals: Time Frame: 1 Day  Patient will demonstrate independence with a home exercise program designed to increase strength, range of motion, and pain control to minimize functional deficits and optimize patient for total joint replacement.    Subjective:   Past Medical History/Comorbidities:   Ms. Quiroz  has a past medical history of Arthritis, Atrial fibrillation with RVR (HCC), Johnson esophagus, Chronic pain, GERD (gastroesophageal reflux disease), Glaucoma, Hyperlipidemia, Hypertension, and  shania , Decreased step length, and Decreased stance    DME None     Stairs      Ramp     I=Independent, Mod I=Modified Independent, S=Supervision, SBA=Standby Assistance, CGA=Contact Guard Assistance,   Min=Minimal Assistance, Mod=Moderate Assistance, Max=Maximal Assistance, Total=Total Assistance, NT=Not Tested    TREATMENT:   EVALUATION: LOW COMPLEXITY: (Untimed Charge)  The initial evaluation charge encompasses clinical chart review, objective assessment, interpretation of assessment, and skilled monitoring of the patient's response to treatment in order to develop a plan of care.     TREATMENT PLAN:   Effective Dates: 5/30/2024 TO 5/30/2024.    Treatment/Session Assessment:  Patient was instructed in PT- HEP to increase strength and ROM in LEs.  Answered all questions.  Frequency/Duration: Patient to continue to perform home exercise program at least twice per day up until her surgery.    EDUCATION: Education Given To: Patient, Family  Education Provided: Role of Therapy, Plan of Care, Home Exercise Program, Precautions  Education Method: Verbal, Demonstration, Teach Back, Printed Information/Hand-outs  Education Outcome: Verbalized understanding, Demonstrated understanding    MEDICAL NECESSITY: Ms. Quiroz is expected to optimize herlower extremity strength and ROM in preparation for joint replacement surgery.    REASON FOR CONTINUED SERVICES: Achieve baseline assesment of musculoskeletal system, functional mobility and home environment., educate in PT HEP in preparation for surgery, educate in hospital plan of care.    COMPLIANCE WITH PROGRAM/EXERCISE: compliant most of the time.    TOTAL TREATMENT DURATION:  Time In: 0730  Time Out: 0745  Minutes: 15    Regarding Debi Quiroz's therapy, I certify that the treatment plan above will be carried out by a therapist or under their direction.  Thank you for this referral,  JORDAN GARCIA, PT

## 2024-05-30 NOTE — PERIOP NOTE
PLEASE CONTINUE TAKING ALL PRESCRIPTION MEDICATIONS UP TO THE DAY OF SURGERY UNLESS OTHERWISE DIRECTED BELOW.    DISCONTINUE all vitamins, herbals and supplements 3 weeks prior to surgery. DISCONTINUE Non-Steroidal Anti-Inflammatory (NSAIDS) such as Advil, Ibuprofen, Motrin, Naproxen and Aleve 5 days prior to surgery.       Home Medications to take  the day of surgery    Duloxetine, Nifedipine, Oxybutynin, Omeprazole, eye drops           Home Medications to Hold- please continue all other medications except these.    Hold Aspirin and Diclofenac for 5 days before surgery.        Comments   On the day before surgery please take Acetaminophen 1000mg in the morning and then again before bed. You may substitute for Tylenol 650 mg.      Bring Dynahex wash, Incentive Spirometer, eye drops, and omeprazole with you to hospital on the day of surgery.            Please do not bring home medications with you on the day of surgery unless otherwise directed by your nurse.  If you are instructed to bring home medications, please give them to your nurse as they will be administered by the nursing staff.    If you have any questions, please call [facility] [phone].    A copy of this note was provided to the patient for reference.

## 2024-05-30 NOTE — PERIOP NOTE
Lab results within anesthesia guidelines, no follow-up required. Labs automatically routed to ordering provider via Epic documentation.      MRSA swab still processing.       Latest Reference Range & Units 05/30/24 07:37 05/30/24 07:59   Sodium 136 - 145 mmol/L  143   Potassium 3.5 - 5.1 mmol/L  3.1 (L)   Chloride 98 - 107 mmol/L  99   CARBON DIOXIDE 20 - 28 mmol/L  31 (H)   BUN,BUNPL 8 - 23 MG/DL  12   Creatinine 0.60 - 1.10 MG/DL  0.77   Anion Gap 9 - 18 mmol/L  13   Est, Glom Filt Rate >60 ml/min/1.73m2  74   Glucose 70 - 99 mg/dL  109 (H)   Calcium 8.8 - 10.2 MG/DL  9.4   Hemoglobin A1C 0 - 5.6 %  6.6 (H)   eAG (mg/dL) mg/dL  144   WBC 4.3 - 11.1 K/uL  6.4   RBC 4.05 - 5.2 M/uL  4.35   Hemoglobin Quant 11.7 - 15.4 g/dL  11.8   Hematocrit 35.8 - 46.3 %  38.2   MCV 82.0 - 102.0 FL  87.8   MCH 26.1 - 32.9 PG  27.1   MCHC 31.4 - 35.0 g/dL  30.9 (L)   MPV 9.4 - 12.3 FL  10.6   RDW 11.9 - 14.6 %  15.0 (H)   Platelet Count 150 - 450 K/uL  247   Neutrophils % 43 - 78 %  59   Lymphocyte % 13 - 44 %  28   Monocytes % 4.0 - 12.0 %  11   Eosinophils % 0.5 - 7.8 %  1   Basophils % 0.0 - 2.0 %  1   Neutrophils Absolute 1.7 - 8.2 K/UL  3.8   Lymphocytes Absolute 0.5 - 4.6 K/UL  1.8   Monocytes Absolute 0.1 - 1.3 K/UL  0.7   Eosinophils Absolute 0.0 - 0.8 K/UL  0.1   Basophils Absolute 0.0 - 0.2 K/UL  0.0   Differential Type -    AUTOMATED   Immature Granulocytes % 0.0 - 5.0 %  0   Nucleated Red Blood Cells 0.0 - 0.2 K/uL  0.00   Immature Granulocytes Absolute 0.0 - 0.5 K/UL  0.0   Prothrombin Time 11.3 - 14.9 sec  13.5   INR -    1.1   EKG 12-LEAD  Rpt    Atrial Rate BPM 79    Diagnosis  Normal sinus rhythm  Possible Anterior infarct , age undetermined  Abnormal ECG  No previous ECGs available  Confirmed by NANCY GARBER MD ([id number]) on 5/30/2024 11:57:51 AM      P Axis degrees 51    P-R Interval ms 148    Q-T Interval ms 404    QRS Duration ms 92    QTc Calculation (Bazett) ms 463    R Axis degrees 68    T Axis degrees 57     Ventricular Rate BPM 79    MRSA/STAPH AUREUS DNA   Rpt   (L): Data is abnormally low  (H): Data is abnormally high  Rpt: View report in Results Review for more information

## 2024-05-30 NOTE — PROGRESS NOTES
05/30/24 0855   Treatment   Treatment Type Bedside spirometry   Breath Sounds   Breath Sounds Bilateral Clear   Oxygen Therapy/Pulse Ox   O2 Therapy Room air   Pulse 85   SpO2 92 %   Pulse Oximeter Device Mode Intermittent   $Pulse Oximeter $Spot check (single)   Bedside Spirometry   FEV-1/Actual (Liters) 1.36 Liters   FEV-1/Predicted (Liters) 97 Liters     Initial respiratory Assessment completed with pt. Pt was interviewed and evaluated in [facility] prior to surgery.  Patient ID:  Debi Quiroz  [id number]  88 y.o.  1935  Surgeon:  PAT  Date of Surgery: [unfilled]6/11/2024  Procedure: Total Right Knee Arthroplasty  Primary Care Physician: Sg Johnson, APRN - [phone]  Specialists:    Pt taught proper COUGH technique  IS REVIEWED WITH PT AS WELL AS BENEFITS OF USING IS IN SEDENTARY PTS.  DIAPHRAGMATIC BREATHING EXERCISE INSTRUCTIONS GIVEN    History of smoking:   DENIES                 Quit date:         Secondhand smoke:DENIES    Past procedures with Oxygen desaturation or delayed awakening:DENIES     Respiratory history:DENIES SOB                                                                  Respiratory meds:  DENIES    FAMILY PRESENT:            SON  PAST SLEEP STUDY:                     DENIES  HX OF PO:                                     DENIES  PO assessment:     DANGERS OF UNTREATED PO EXPLAINED TO PT.                                          SLEEPS ON SIDE      PHYSICAL EXAM   Body mass index is 31.84 kg/m².   Vitals:    05/30/24 0855   BP:    Pulse: (P) 85   Temp:    SpO2: (P) 92%     Neck circumference: 40     cm    Loud snoring:                                                 YES            Witnessed apnea or wakening gasping or choking:        DENIES      Awakens with headaches:                                               DENIES  Morning or daytime tiredness/ sleepiness:                      DENIES            Dry mouth or sore throat in morning:

## 2024-05-30 NOTE — PERIOP NOTE
Patient verified name and .    Order for consent IS found in EHR and matches case posting; patient verified.     Type 3 surgery, joint camp assessment complete.    Labs per surgeon: CBC,BMP, PT/PTT, Hgb A1c, MRSA swab ; results processing.  Labs per anesthesia protocol: no additional  EKG: done today, anesthesia to review.    MRSA/MSSA swab collected per policy. MD to consult pharmacy to dose Vanc if appropriate.     Hospital approved surgical skin cleanser and instructions to return bottle on DOS given per hospital policy.    Patient provided with handouts including Guide to Surgery, Pain Management, Preventing Surgical Site Infections, and [place] [facility] Brochure.    Patient answered medical/surgical history questions at their best of ability. All prior to admission medications documented in [facility]. Original medication prescription bottles were not visualized during patient appointment.     Patient instructed to hold all vitamins 3 weeks prior to surgery and NSAIDS 5 days prior to surgery.     Patient teach back successful and patient demonstrates knowledge of instruction.

## 2024-05-31 LAB
MRSA DNA SPEC QL NAA+PROBE: DETECTED
S AUREUS CPE NOSE QL NAA+PROBE: DETECTED

## 2024-06-03 ENCOUNTER — OFFICE VISIT (OUTPATIENT)
Dept: ORTHOPEDIC SURGERY | Age: 89
End: 2024-06-03

## 2024-06-03 DIAGNOSIS — M17.11 ARTHRITIS OF RIGHT KNEE: Primary | ICD-10-CM

## 2024-06-03 PROCEDURE — 99024 POSTOP FOLLOW-UP VISIT: CPT | Performed by: ORTHOPAEDIC SURGERY

## 2024-06-03 RX ORDER — OXYCODONE HYDROCHLORIDE 5 MG/1
5-10 TABLET ORAL EVERY 4 HOURS PRN
Qty: 40 TABLET | Refills: 0 | Status: SHIPPED | OUTPATIENT
Start: 2024-06-03 | End: 2024-06-08

## 2024-06-03 RX ORDER — MELOXICAM 15 MG/1
15 TABLET ORAL DAILY
Qty: 30 TABLET | Refills: 3 | Status: SHIPPED | OUTPATIENT
Start: 2024-06-03

## 2024-06-03 RX ORDER — TRAMADOL HYDROCHLORIDE 50 MG/1
50 TABLET ORAL EVERY 4 HOURS PRN
Qty: 30 TABLET | Refills: 0 | Status: SHIPPED | OUTPATIENT
Start: 2024-06-03 | End: 2024-06-08

## 2024-06-03 RX ORDER — ACETAMINOPHEN 325 MG/1
975 TABLET ORAL EVERY 8 HOURS
Qty: 60 TABLET | Refills: 2 | Status: SHIPPED | OUTPATIENT
Start: 2024-06-03

## 2024-06-03 RX ORDER — ASPIRIN 81 MG/1
81 TABLET ORAL EVERY 12 HOURS
Qty: 70 TABLET | Refills: 0 | Status: SHIPPED | OUTPATIENT
Start: 2024-06-03

## 2024-06-03 RX ORDER — SENNA AND DOCUSATE SODIUM 50; 8.6 MG/1; MG/1
1 TABLET, FILM COATED ORAL DAILY
Qty: 30 TABLET | Refills: 1 | Status: SHIPPED | OUTPATIENT
Start: 2024-06-03
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
## 2024-06-04 ENCOUNTER — TELEPHONE (OUTPATIENT)
Dept: ORTHOPEDIC SURGERY | Age: 89
End: 2024-06-04

## 2024-06-04 NOTE — TELEPHONE ENCOUNTER
Spoke with patient and informed her that she can take her sleeping medication the night before, but nothing the morning of except for the medications that she was told she can take at her PAT visit.  Patient verbalized understanding and voiced no other concerns at this time.

## 2024-06-05 ASSESSMENT — PROMIS GLOBAL HEALTH SCALE
WHO IS THE PERSON COMPLETING THE PROMIS V1.1 SURVEY?: SURROGATE
IN GENERAL, HOW WOULD YOU RATE YOUR PHYSICAL HEALTH [ON A SCALE OF 1 (POOR) TO 5 (EXCELLENT)]?: VERY GOOD
IN GENERAL, PLEASE RATE HOW WELL YOU CARRY OUT YOUR USUAL SOCIAL ACTIVITIES (INCLUDES ACTIVITIES AT HOME, AT WORK, AND IN YOUR COMMUNITY, AND RESPONSIBILITIES AS A PARENT, CHILD, SPOUSE, EMPLOYEE, FRIEND, ETC) [ON A SCALE OF 1 (POOR) TO 5 (EXCELLENT)]?: VERY GOOD
IN GENERAL, WOULD YOU SAY YOUR HEALTH IS...[ON A SCALE OF 1 (POOR) TO 5 (EXCELLENT)]: VERY GOOD
IN THE PAST 7 DAYS, HOW OFTEN HAVE YOU BEEN BOTHERED BY EMOTIONAL PROBLEMS, SUCH AS FEELING ANXIOUS, DEPRESSED, OR IRRITABLE [ON A SCALE FROM 1 (NEVER) TO 5 (ALWAYS)]?: NEVER
SUM OF RESPONSES TO QUESTIONS 3, 6, 7, & 8: 18
IN THE PAST 7 DAYS, HOW WOULD YOU RATE YOUR PAIN ON AVERAGE [ON A SCALE FROM 0 (NO PAIN) TO 10 (WORST IMAGINABLE PAIN)]?: 7
IN THE PAST 7 DAYS, HOW WOULD YOU RATE YOUR FATIGUE ON AVERAGE [ON A SCALE FROM 1 (NONE) TO 5 (VERY SEVERE)]?: MILD
IN GENERAL, WOULD YOU SAY YOUR QUALITY OF LIFE IS...[ON A SCALE OF 1 (POOR) TO 5 (EXCELLENT)]: VERY GOOD
SUM OF RESPONSES TO QUESTIONS 2, 4, 5, & 10: 19
IN GENERAL, HOW WOULD YOU RATE YOUR MENTAL HEALTH, INCLUDING YOUR MOOD AND YOUR ABILITY TO THINK [ON A SCALE OF 1 (POOR) TO 5 (EXCELLENT)]?: EXCELLENT
IN GENERAL, HOW WOULD YOU RATE YOUR SATISFACTION WITH YOUR SOCIAL ACTIVITIES AND RELATIONSHIPS [ON A SCALE OF 1 (POOR) TO 5 (EXCELLENT)]?: EXCELLENT
TO WHAT EXTENT ARE YOU ABLE TO CARRY OUT YOUR EVERYDAY PHYSICAL ACTIVITIES SUCH AS WALKING, CLIMBING STAIRS, CARRYING GROCERIES, OR MOVING A CHAIR [ON A SCALE OF 1 (NOT AT ALL) TO 5 (COMPLETELY)]?: MODERATELY
HOW IS THE PROMIS V1.1 BEING ADMINISTERED?: PAPER

## 2024-06-10 ENCOUNTER — ANESTHESIA EVENT (OUTPATIENT)
Dept: SURGERY | Age: 89
End: 2024-06-10
Payer: MEDICARE

## 2024-06-11 ENCOUNTER — APPOINTMENT (OUTPATIENT)
Dept: GENERAL RADIOLOGY | Age: 89
End: 2024-06-11
Attending: ORTHOPAEDIC SURGERY
Payer: MEDICARE

## 2024-06-11 ENCOUNTER — HOSPITAL ENCOUNTER (OUTPATIENT)
Age: 89
Setting detail: OBSERVATION
Discharge: HOME | End: 2024-06-12
Attending: ORTHOPAEDIC SURGERY | Admitting: ORTHOPAEDIC SURGERY
Payer: MEDICARE

## 2024-06-11 ENCOUNTER — ANESTHESIA (OUTPATIENT)
Dept: SURGERY | Age: 89
End: 2024-06-11
Payer: MEDICARE

## 2024-06-11 ENCOUNTER — HOME HEALTH ADMISSION (OUTPATIENT)
Dept: HOME HEALTH SERVICES | Facility: HOME HEALTH | Age: 89
End: 2024-06-11
Payer: MEDICARE

## 2024-06-11 DIAGNOSIS — M17.11 PRIMARY OSTEOARTHRITIS OF RIGHT KNEE: Primary | ICD-10-CM

## 2024-06-11 LAB
ABO + RH BLD: NORMAL
BLOOD GROUP ANTIBODIES SERPL: NORMAL
GLUCOSE BLD STRIP.AUTO-MCNC: 145 MG/DL (ref 65–100)
POTASSIUM SERPL-SCNC: 3.9 MMOL/L (ref 3.5–5.1)
SERVICE CMNT-IMP: ABNORMAL
SPECIMEN EXP DATE BLD: NORMAL

## 2024-06-11 PROCEDURE — 2580000003 HC RX 258: Performed by: ORTHOPAEDIC SURGERY

## 2024-06-11 PROCEDURE — 97530 THERAPEUTIC ACTIVITIES: CPT

## 2024-06-11 PROCEDURE — 82962 GLUCOSE BLOOD TEST: CPT

## 2024-06-11 PROCEDURE — 86900 BLOOD TYPING SEROLOGIC ABO: CPT

## 2024-06-11 PROCEDURE — 97110 THERAPEUTIC EXERCISES: CPT

## 2024-06-11 PROCEDURE — 97535 SELF CARE MNGMENT TRAINING: CPT

## 2024-06-11 PROCEDURE — 94761 N-INVAS EAR/PLS OXIMETRY MLT: CPT

## 2024-06-11 PROCEDURE — 6360000002 HC RX W HCPCS: Performed by: STUDENT IN AN ORGANIZED HEALTH CARE EDUCATION/TRAINING PROGRAM

## 2024-06-11 PROCEDURE — 6370000000 HC RX 637 (ALT 250 FOR IP): Performed by: ORTHOPAEDIC SURGERY

## 2024-06-11 PROCEDURE — 2700000000 HC OXYGEN THERAPY PER DAY

## 2024-06-11 PROCEDURE — 6360000002 HC RX W HCPCS: Performed by: NURSE ANESTHETIST, CERTIFIED REGISTERED

## 2024-06-11 PROCEDURE — 7100000001 HC PACU RECOVERY - ADDTL 15 MIN: Performed by: ORTHOPAEDIC SURGERY

## 2024-06-11 PROCEDURE — 6360000002 HC RX W HCPCS: Performed by: ORTHOPAEDIC SURGERY

## 2024-06-11 PROCEDURE — 84132 ASSAY OF SERUM POTASSIUM: CPT

## 2024-06-11 PROCEDURE — 2580000003 HC RX 258: Performed by: STUDENT IN AN ORGANIZED HEALTH CARE EDUCATION/TRAINING PROGRAM

## 2024-06-11 PROCEDURE — 73560 X-RAY EXAM OF KNEE 1 OR 2: CPT

## 2024-06-11 PROCEDURE — C1776 JOINT DEVICE (IMPLANTABLE): HCPCS | Performed by: ORTHOPAEDIC SURGERY

## 2024-06-11 PROCEDURE — 27447 TOTAL KNEE ARTHROPLASTY: CPT | Performed by: ORTHOPAEDIC SURGERY

## 2024-06-11 PROCEDURE — 2500000003 HC RX 250 WO HCPCS: Performed by: NURSE ANESTHETIST, CERTIFIED REGISTERED

## 2024-06-11 PROCEDURE — 3600000005 HC SURGERY LEVEL 5 BASE: Performed by: ORTHOPAEDIC SURGERY

## 2024-06-11 PROCEDURE — 2709999900 HC NON-CHARGEABLE SUPPLY: Performed by: ORTHOPAEDIC SURGERY

## 2024-06-11 PROCEDURE — G0378 HOSPITAL OBSERVATION PER HR: HCPCS

## 2024-06-11 PROCEDURE — 97165 OT EVAL LOW COMPLEX 30 MIN: CPT

## 2024-06-11 PROCEDURE — 3700000001 HC ADD 15 MINUTES (ANESTHESIA): Performed by: ORTHOPAEDIC SURGERY

## 2024-06-11 PROCEDURE — 7100000000 HC PACU RECOVERY - FIRST 15 MIN: Performed by: ORTHOPAEDIC SURGERY

## 2024-06-11 PROCEDURE — 3600000015 HC SURGERY LEVEL 5 ADDTL 15MIN: Performed by: ORTHOPAEDIC SURGERY

## 2024-06-11 PROCEDURE — C1713 ANCHOR/SCREW BN/BN,TIS/BN: HCPCS | Performed by: ORTHOPAEDIC SURGERY

## 2024-06-11 PROCEDURE — 64447 NJX AA&/STRD FEMORAL NRV IMG: CPT | Performed by: STUDENT IN AN ORGANIZED HEALTH CARE EDUCATION/TRAINING PROGRAM

## 2024-06-11 PROCEDURE — 86850 RBC ANTIBODY SCREEN: CPT

## 2024-06-11 PROCEDURE — 2500000003 HC RX 250 WO HCPCS: Performed by: ORTHOPAEDIC SURGERY

## 2024-06-11 PROCEDURE — 3700000000 HC ANESTHESIA ATTENDED CARE: Performed by: ORTHOPAEDIC SURGERY

## 2024-06-11 PROCEDURE — 86901 BLOOD TYPING SEROLOGIC RH(D): CPT

## 2024-06-11 PROCEDURE — 97161 PT EVAL LOW COMPLEX 20 MIN: CPT

## 2024-06-11 DEVICE — ATTUNE KNEE SYSTEM FEMORAL CRUCIATE RETAINING NARROW SIZE 6N RIGHT CEMENTED
Type: IMPLANTABLE DEVICE | Site: KNEE | Status: FUNCTIONAL
Brand: ATTUNE

## 2024-06-11 DEVICE — KNEE K1 TOT HEMI STD CEM IMPL CAPPED SYNTHES: Type: IMPLANTABLE DEVICE | Status: FUNCTIONAL

## 2024-06-11 DEVICE — ATTUNE KNEE SYSTEM TIBIAL INSERT FIXED BEARING MEDIAL STABILIZED RIGHT AOX 6, 6MM
Type: IMPLANTABLE DEVICE | Site: KNEE | Status: FUNCTIONAL
Brand: ATTUNE

## 2024-06-11 DEVICE — ATTUNE KNEE SYSTEM TIBIAL BASE FIXED BEARING SIZE 5 CEMENTED
Type: IMPLANTABLE DEVICE | Site: KNEE | Status: FUNCTIONAL
Brand: ATTUNE

## 2024-06-11 DEVICE — PALACOS® R IS A FAST-CURING, RADIOPAQUE, POLY(METHYL METHACRYLATE)-BASED BONE CEMENT.PALACOS ® R CONTAINS THE X-RAY CONTRAST MEDIUM ZIRCONIUM DIOXIDE. TO IMPROVE VISIBILITY IN THE SURGICAL FIELD PALACOS ® R HAS BEEN COLOURED WITH CHLOROPHYLL (E141). THE BONE CEMENT IS PREPARED DIRECTLY BEFORE USE BY MIXING A POLYMER POWDER COMPONENT WITH A LIQUID MONOMER COMPONENT. A DUCTILE DOUGH FORMS WHICH CURES WITHIN A FEW MINUTES.
Type: IMPLANTABLE DEVICE | Site: KNEE | Status: FUNCTIONAL
Brand: PALACOS®

## 2024-06-11 DEVICE — ATTUNE PATELLA MEDIALIZED DOME 35MM CEMENTED AOX
Type: IMPLANTABLE DEVICE | Site: KNEE | Status: FUNCTIONAL
Brand: ATTUNE

## 2024-06-11 RX ORDER — KETOROLAC TROMETHAMINE 15 MG/ML
15 INJECTION, SOLUTION INTRAMUSCULAR; INTRAVENOUS EVERY 6 HOURS
Status: DISPENSED | OUTPATIENT
Start: 2024-06-11 | End: 2024-06-12

## 2024-06-11 RX ORDER — SODIUM CHLORIDE 0.9 % (FLUSH) 0.9 %
5-40 SYRINGE (ML) INJECTION PRN
Status: DISCONTINUED | OUTPATIENT
Start: 2024-06-11 | End: 2024-06-11 | Stop reason: HOSPADM

## 2024-06-11 RX ORDER — ONDANSETRON 4 MG/1
4 TABLET, ORALLY DISINTEGRATING ORAL EVERY 8 HOURS PRN
Status: DISCONTINUED | OUTPATIENT
Start: 2024-06-11 | End: 2024-06-12 | Stop reason: HOSPADM

## 2024-06-11 RX ORDER — PROPOFOL 10 MG/ML
INJECTION, EMULSION INTRAVENOUS PRN
Status: DISCONTINUED | OUTPATIENT
Start: 2024-06-11 | End: 2024-06-11 | Stop reason: SDUPTHER

## 2024-06-11 RX ORDER — DIPHENHYDRAMINE HCL 25 MG
25 CAPSULE ORAL EVERY 6 HOURS PRN
Status: DISCONTINUED | OUTPATIENT
Start: 2024-06-11 | End: 2024-06-12 | Stop reason: HOSPADM

## 2024-06-11 RX ORDER — TIMOLOL MALEATE 5 MG/ML
1 SOLUTION/ DROPS OPHTHALMIC DAILY
Status: DISCONTINUED | OUTPATIENT
Start: 2024-06-12 | End: 2024-06-12 | Stop reason: HOSPADM

## 2024-06-11 RX ORDER — DEXAMETHASONE SODIUM PHOSPHATE 4 MG/ML
INJECTION, SOLUTION INTRA-ARTICULAR; INTRALESIONAL; INTRAMUSCULAR; INTRAVENOUS; SOFT TISSUE
Status: COMPLETED | OUTPATIENT
Start: 2024-06-11 | End: 2024-06-11

## 2024-06-11 RX ORDER — ONDANSETRON 2 MG/ML
4 INJECTION INTRAMUSCULAR; INTRAVENOUS
Status: DISCONTINUED | OUTPATIENT
Start: 2024-06-11 | End: 2024-06-11 | Stop reason: HOSPADM

## 2024-06-11 RX ORDER — KETOROLAC TROMETHAMINE 30 MG/ML
INJECTION, SOLUTION INTRAMUSCULAR; INTRAVENOUS PRN
Status: DISCONTINUED | OUTPATIENT
Start: 2024-06-11 | End: 2024-06-11 | Stop reason: HOSPADM

## 2024-06-11 RX ORDER — SODIUM CHLORIDE, SODIUM LACTATE, POTASSIUM CHLORIDE, CALCIUM CHLORIDE 600; 310; 30; 20 MG/100ML; MG/100ML; MG/100ML; MG/100ML
INJECTION, SOLUTION INTRAVENOUS CONTINUOUS
Status: DISCONTINUED | OUTPATIENT
Start: 2024-06-11 | End: 2024-06-11 | Stop reason: HOSPADM

## 2024-06-11 RX ORDER — OXYCODONE HYDROCHLORIDE 5 MG/1
5 TABLET ORAL
Status: DISCONTINUED | OUTPATIENT
Start: 2024-06-11 | End: 2024-06-12 | Stop reason: HOSPADM

## 2024-06-11 RX ORDER — SODIUM CHLORIDE 9 MG/ML
INJECTION, SOLUTION INTRAVENOUS CONTINUOUS
Status: DISCONTINUED | OUTPATIENT
Start: 2024-06-11 | End: 2024-06-12 | Stop reason: HOSPADM

## 2024-06-11 RX ORDER — SODIUM CHLORIDE 0.9 % (FLUSH) 0.9 %
5-40 SYRINGE (ML) INJECTION PRN
Status: DISCONTINUED | OUTPATIENT
Start: 2024-06-11 | End: 2024-06-12 | Stop reason: HOSPADM

## 2024-06-11 RX ORDER — NALOXONE HYDROCHLORIDE 0.4 MG/ML
INJECTION, SOLUTION INTRAMUSCULAR; INTRAVENOUS; SUBCUTANEOUS PRN
Status: DISCONTINUED | OUTPATIENT
Start: 2024-06-11 | End: 2024-06-11 | Stop reason: HOSPADM

## 2024-06-11 RX ORDER — SENNA AND DOCUSATE SODIUM 50; 8.6 MG/1; MG/1
1 TABLET, FILM COATED ORAL 2 TIMES DAILY
Status: DISCONTINUED | OUTPATIENT
Start: 2024-06-11 | End: 2024-06-12 | Stop reason: HOSPADM

## 2024-06-11 RX ORDER — SODIUM CHLORIDE 0.9 % (FLUSH) 0.9 %
5-40 SYRINGE (ML) INJECTION EVERY 12 HOURS SCHEDULED
Status: DISCONTINUED | OUTPATIENT
Start: 2024-06-11 | End: 2024-06-11 | Stop reason: HOSPADM

## 2024-06-11 RX ORDER — OXYBUTYNIN CHLORIDE 5 MG/1
5 TABLET, EXTENDED RELEASE ORAL DAILY
Status: DISCONTINUED | OUTPATIENT
Start: 2024-06-12 | End: 2024-06-12 | Stop reason: HOSPADM

## 2024-06-11 RX ORDER — OXYCODONE HYDROCHLORIDE 5 MG/1
10 TABLET ORAL
Status: DISCONTINUED | OUTPATIENT
Start: 2024-06-11 | End: 2024-06-12 | Stop reason: HOSPADM

## 2024-06-11 RX ORDER — ONDANSETRON 2 MG/ML
4 INJECTION INTRAMUSCULAR; INTRAVENOUS EVERY 6 HOURS PRN
Status: DISCONTINUED | OUTPATIENT
Start: 2024-06-11 | End: 2024-06-12 | Stop reason: HOSPADM

## 2024-06-11 RX ORDER — ASPIRIN 81 MG/1
81 TABLET ORAL 2 TIMES DAILY
Status: DISCONTINUED | OUTPATIENT
Start: 2024-06-11 | End: 2024-06-12 | Stop reason: HOSPADM

## 2024-06-11 RX ORDER — SODIUM CHLORIDE 9 MG/ML
INJECTION, SOLUTION INTRAVENOUS PRN
Status: DISCONTINUED | OUTPATIENT
Start: 2024-06-11 | End: 2024-06-12 | Stop reason: HOSPADM

## 2024-06-11 RX ORDER — DULOXETIN HYDROCHLORIDE 20 MG/1
20 CAPSULE, DELAYED RELEASE ORAL DAILY
Status: DISCONTINUED | OUTPATIENT
Start: 2024-06-12 | End: 2024-06-12 | Stop reason: HOSPADM

## 2024-06-11 RX ORDER — DIPHENHYDRAMINE HYDROCHLORIDE 50 MG/ML
25 INJECTION INTRAMUSCULAR; INTRAVENOUS EVERY 6 HOURS PRN
Status: DISCONTINUED | OUTPATIENT
Start: 2024-06-11 | End: 2024-06-12 | Stop reason: HOSPADM

## 2024-06-11 RX ORDER — MIDAZOLAM HYDROCHLORIDE 2 MG/2ML
2 INJECTION, SOLUTION INTRAMUSCULAR; INTRAVENOUS
Status: DISCONTINUED | OUTPATIENT
Start: 2024-06-11 | End: 2024-06-11 | Stop reason: HOSPADM

## 2024-06-11 RX ORDER — PROCHLORPERAZINE EDISYLATE 5 MG/ML
5 INJECTION INTRAMUSCULAR; INTRAVENOUS
Status: DISCONTINUED | OUTPATIENT
Start: 2024-06-11 | End: 2024-06-11 | Stop reason: HOSPADM

## 2024-06-11 RX ORDER — TRANEXAMIC ACID 100 MG/ML
INJECTION, SOLUTION INTRAVENOUS PRN
Status: DISCONTINUED | OUTPATIENT
Start: 2024-06-11 | End: 2024-06-11 | Stop reason: SDUPTHER

## 2024-06-11 RX ORDER — GABAPENTIN 400 MG/1
400 CAPSULE ORAL 2 TIMES DAILY
Status: DISCONTINUED | OUTPATIENT
Start: 2024-06-11 | End: 2024-06-12 | Stop reason: HOSPADM

## 2024-06-11 RX ORDER — TEMAZEPAM 15 MG/1
30 CAPSULE ORAL
Status: DISCONTINUED | OUTPATIENT
Start: 2024-06-11 | End: 2024-06-12 | Stop reason: HOSPADM

## 2024-06-11 RX ORDER — SODIUM CHLORIDE 9 MG/ML
INJECTION, SOLUTION INTRAVENOUS PRN
Status: DISCONTINUED | OUTPATIENT
Start: 2024-06-11 | End: 2024-06-11 | Stop reason: HOSPADM

## 2024-06-11 RX ORDER — SODIUM CHLORIDE 0.9 % (FLUSH) 0.9 %
5-40 SYRINGE (ML) INJECTION EVERY 12 HOURS SCHEDULED
Status: DISCONTINUED | OUTPATIENT
Start: 2024-06-11 | End: 2024-06-12 | Stop reason: HOSPADM

## 2024-06-11 RX ORDER — ACETAMINOPHEN 500 MG
1000 TABLET ORAL EVERY 8 HOURS SCHEDULED
Status: DISCONTINUED | OUTPATIENT
Start: 2024-06-11 | End: 2024-06-12 | Stop reason: HOSPADM

## 2024-06-11 RX ORDER — ROPIVACAINE HYDROCHLORIDE 2 MG/ML
INJECTION, SOLUTION EPIDURAL; INFILTRATION; PERINEURAL PRN
Status: DISCONTINUED | OUTPATIENT
Start: 2024-06-11 | End: 2024-06-11 | Stop reason: HOSPADM

## 2024-06-11 RX ORDER — FENTANYL CITRATE 50 UG/ML
100 INJECTION, SOLUTION INTRAMUSCULAR; INTRAVENOUS
Status: COMPLETED | OUTPATIENT
Start: 2024-06-11 | End: 2024-06-11

## 2024-06-11 RX ORDER — EPHEDRINE SULFATE/0.9% NACL/PF 50 MG/5 ML
SYRINGE (ML) INTRAVENOUS PRN
Status: DISCONTINUED | OUTPATIENT
Start: 2024-06-11 | End: 2024-06-11 | Stop reason: SDUPTHER

## 2024-06-11 RX ORDER — HYDROCHLOROTHIAZIDE 25 MG/1
25 TABLET ORAL DAILY
Status: DISCONTINUED | OUTPATIENT
Start: 2024-06-12 | End: 2024-06-12 | Stop reason: HOSPADM

## 2024-06-11 RX ORDER — ACETAMINOPHEN 500 MG
1000 TABLET ORAL ONCE
Status: COMPLETED | OUTPATIENT
Start: 2024-06-11 | End: 2024-06-11

## 2024-06-11 RX ORDER — LIDOCAINE HYDROCHLORIDE 10 MG/ML
1 INJECTION, SOLUTION INFILTRATION; PERINEURAL
Status: DISCONTINUED | OUTPATIENT
Start: 2024-06-11 | End: 2024-06-11 | Stop reason: HOSPADM

## 2024-06-11 RX ORDER — BUPIVACAINE HYDROCHLORIDE 7.5 MG/ML
INJECTION, SOLUTION INTRASPINAL
Status: COMPLETED | OUTPATIENT
Start: 2024-06-11 | End: 2024-06-11

## 2024-06-11 RX ORDER — ATORVASTATIN CALCIUM 10 MG/1
20 TABLET, FILM COATED ORAL DAILY
Status: DISCONTINUED | OUTPATIENT
Start: 2024-06-12 | End: 2024-06-12 | Stop reason: HOSPADM

## 2024-06-11 RX ORDER — OXYCODONE HYDROCHLORIDE 5 MG/1
5 TABLET ORAL
Status: DISCONTINUED | OUTPATIENT
Start: 2024-06-11 | End: 2024-06-11 | Stop reason: HOSPADM

## 2024-06-11 RX ADMIN — PHENYLEPHRINE HYDROCHLORIDE 50 MCG: 0.1 INJECTION, SOLUTION INTRAVENOUS at 07:36

## 2024-06-11 RX ADMIN — CEFAZOLIN 2000 MG: 10 INJECTION, POWDER, FOR SOLUTION INTRAVENOUS at 14:17

## 2024-06-11 RX ADMIN — SODIUM CHLORIDE, SODIUM LACTATE, POTASSIUM CHLORIDE, AND CALCIUM CHLORIDE: 600; 310; 30; 20 INJECTION, SOLUTION INTRAVENOUS at 06:52

## 2024-06-11 RX ADMIN — OXYCODONE 10 MG: 5 TABLET ORAL at 14:15

## 2024-06-11 RX ADMIN — TUBERCULIN PURIFIED PROTEIN DERIVATIVE 5 UNITS: 5 INJECTION, SOLUTION INTRADERMAL at 12:17

## 2024-06-11 RX ADMIN — PROPOFOL 50 MG: 10 INJECTION, EMULSION INTRAVENOUS at 07:21

## 2024-06-11 RX ADMIN — VANCOMYCIN HYDROCHLORIDE 1250 MG: 10 INJECTION, POWDER, LYOPHILIZED, FOR SOLUTION INTRAVENOUS at 06:17

## 2024-06-11 RX ADMIN — SENNOSIDES AND DOCUSATE SODIUM 1 TABLET: 50; 8.6 TABLET ORAL at 21:04

## 2024-06-11 RX ADMIN — GABAPENTIN 400 MG: 400 CAPSULE ORAL at 21:04

## 2024-06-11 RX ADMIN — PHENYLEPHRINE HYDROCHLORIDE 50 MCG: 0.1 INJECTION, SOLUTION INTRAVENOUS at 08:38

## 2024-06-11 RX ADMIN — PROPOFOL 75 MCG/KG/MIN: 10 INJECTION, EMULSION INTRAVENOUS at 07:22

## 2024-06-11 RX ADMIN — Medication 5 MG: at 08:38

## 2024-06-11 RX ADMIN — DEXAMETHASONE SODIUM PHOSPHATE 4 MG: 4 INJECTION, SOLUTION INTRAMUSCULAR; INTRAVENOUS at 06:42

## 2024-06-11 RX ADMIN — ASPIRIN 81 MG: 81 TABLET, COATED ORAL at 21:04

## 2024-06-11 RX ADMIN — PHENYLEPHRINE HYDROCHLORIDE 50 MCG: 0.1 INJECTION, SOLUTION INTRAVENOUS at 08:12

## 2024-06-11 RX ADMIN — TEMAZEPAM 30 MG: 15 CAPSULE ORAL at 21:04

## 2024-06-11 RX ADMIN — CEFAZOLIN 2000 MG: 10 INJECTION, POWDER, FOR SOLUTION INTRAVENOUS at 23:01

## 2024-06-11 RX ADMIN — PHENYLEPHRINE HYDROCHLORIDE 50 MCG: 0.1 INJECTION, SOLUTION INTRAVENOUS at 07:29

## 2024-06-11 RX ADMIN — KETOROLAC TROMETHAMINE 15 MG: 15 INJECTION, SOLUTION INTRAMUSCULAR; INTRAVENOUS at 16:30

## 2024-06-11 RX ADMIN — FENTANYL CITRATE 25 MCG: 50 INJECTION INTRAMUSCULAR; INTRAVENOUS at 06:56

## 2024-06-11 RX ADMIN — Medication 5 MG: at 08:10

## 2024-06-11 RX ADMIN — SENNOSIDES AND DOCUSATE SODIUM 1 TABLET: 50; 8.6 TABLET ORAL at 11:48

## 2024-06-11 RX ADMIN — ACETAMINOPHEN 1000 MG: 500 TABLET, FILM COATED ORAL at 06:18

## 2024-06-11 RX ADMIN — Medication 2000 MG: at 07:22

## 2024-06-11 RX ADMIN — Medication 10 MG: at 07:58

## 2024-06-11 RX ADMIN — ROPIVACAINE HYDROCHLORIDE 20 ML: 2 INJECTION, SOLUTION EPIDURAL; INFILTRATION at 06:42

## 2024-06-11 RX ADMIN — BUPIVACAINE HYDROCHLORIDE IN DEXTROSE 10.5 MG: 7.5 INJECTION, SOLUTION SUBARACHNOID at 07:11

## 2024-06-11 RX ADMIN — ACETAMINOPHEN 1000 MG: 500 TABLET, FILM COATED ORAL at 11:48

## 2024-06-11 RX ADMIN — KETOROLAC TROMETHAMINE 15 MG: 15 INJECTION, SOLUTION INTRAMUSCULAR; INTRAVENOUS at 23:01

## 2024-06-11 RX ADMIN — ACETAMINOPHEN 1000 MG: 500 TABLET, FILM COATED ORAL at 21:04

## 2024-06-11 RX ADMIN — TRANEXAMIC ACID 1000 MG: 100 INJECTION, SOLUTION INTRAVENOUS at 07:28

## 2024-06-11 RX ADMIN — PHENYLEPHRINE HYDROCHLORIDE 50 MCG: 0.1 INJECTION, SOLUTION INTRAVENOUS at 07:43

## 2024-06-11 RX ADMIN — Medication 10 MG: at 07:45

## 2024-06-11 RX ADMIN — SODIUM CHLORIDE, PRESERVATIVE FREE 10 ML: 5 INJECTION INTRAVENOUS at 23:01

## 2024-06-11 RX ADMIN — SODIUM CHLORIDE, SODIUM LACTATE, POTASSIUM CHLORIDE, AND CALCIUM CHLORIDE: 600; 310; 30; 20 INJECTION, SOLUTION INTRAVENOUS at 06:17

## 2024-06-11 RX ADMIN — PHENYLEPHRINE HYDROCHLORIDE 50 MCG: 0.1 INJECTION, SOLUTION INTRAVENOUS at 07:58

## 2024-06-11 ASSESSMENT — PAIN - FUNCTIONAL ASSESSMENT
PAIN_FUNCTIONAL_ASSESSMENT: ACTIVITIES ARE NOT PREVENTED
PAIN_FUNCTIONAL_ASSESSMENT: 0-10

## 2024-06-11 ASSESSMENT — PAIN DESCRIPTION - DESCRIPTORS: DESCRIPTORS: ACHING

## 2024-06-11 ASSESSMENT — PAIN SCALES - GENERAL
PAINLEVEL_OUTOF10: 7
PAINLEVEL_OUTOF10: 0

## 2024-06-11 ASSESSMENT — PAIN DESCRIPTION - ORIENTATION: ORIENTATION: RIGHT

## 2024-06-11 ASSESSMENT — PAIN DESCRIPTION - LOCATION: LOCATION: KNEE

## 2024-06-11 NOTE — BRIEF OP NOTE
Brief Postoperative Note      Patient: Debi Quiroz  YOB: 1935  MRN: [id number]    Date of Procedure: 6/11/2024    Pre-Op Diagnosis Codes:     * Osteoarthritis of right knee [M17.11]    Post-Op Diagnosis: Same       Procedure(s):  KNEE TOTAL ARTHROPLASTY    Surgeon(s):  Thomas Goins MD    Assistant:  Lili MORALES    Anesthesia: Spinal    Estimated Blood Loss (mL): less than 50     Complications: None    Specimens:   * No specimens in log *    Implants:  Implant Name Type Inv. Item Serial No.  Lot No. LRB No. Used Action   CEMENT BONE 40GM HI VISC PALACOS R - [id number]  CEMENT BONE 40GM HI VISC PALACOS R  [facility] [id number] Right 1 Implanted   CEMENT BONE 40GM HI VISC PALACOS R - [id number]  CEMENT BONE 40GM HI VISC PALACOS R  [facility] [id number] Right 1 Implanted   INSERT TIB FIX BEAR 6 6 MM RT MEDL KNEE STBL AOX ATTUNE - [id number]  INSERT TIB FIX BEAR 6 6 MM RT MEDL KNEE STBL AOX ATTUNE  [facility] [employer] [facility] [id number] Right 1 Implanted   COMPONENT PAT [id number] KNEE POLY DOME YOBANI MEDIALIZED ATTUNE - [id number]  COMPONENT PAT [id number] KNEE POLY DOME YOBANI MEDIALIZED ATTUNE  [facility] [employer] [facility] [id number] Right 1 Implanted   COMPONENT FEM SZ 6 MED R CRUCE RET YOBANI ATTUNE - [id number]  COMPONENT FEM SZ 6 MED R CRUCE RET YOBANI ATTUNE  [facility] [employer] [facility] [id number] Right 1 Implanted   BASEPLATE TIB SZ 5 FIX BEAR YOBANI S+ TECHNOLOGY ATTUNE - [id number]  BASEPLATE TIB SZ 5 FIX BEAR YOBANI S+ TECHNOLOGY ATTUNE  [facility] [employer] [facility] [id number] Right 1 Implanted         Drains: * No LDAs found *    Findings:  Infection Present At Time Of Surgery (PATOS) (choose all levels that have infection present):  No infection present  Other Findings: oa    Electronically signed by Thomas Goins MD on 6/11/2024 at 9:19 AM

## 2024-06-11 NOTE — PROGRESS NOTES
TRANSFER - IN REPORT:    Verbal report received from Macey RAO on Debi Quiroz  being received from PACU for routine post-op      Report consisted of patient's Situation, Background, Assessment and   Recommendations(SBAR).     Information from the following report(s) Nurse Handoff Report and MAR was reviewed with the receiving nurse.    Opportunity for questions and clarification was provided.      Assessment completed upon patient's arrival to unit and care assumed.

## 2024-06-11 NOTE — ANESTHESIA PROCEDURE NOTES
Spinal Block    Patient location during procedure: OR  End time: 6/11/2024 7:18 AM  Reason for block: primary anesthetic  Staffing  Performed: anesthesiologist   Anesthesiologist: Mohan Edgar MD  Performed by: Mohan Edgar MD  Authorized by: Mohan Edgar MD    Spinal Block  Patient position: sitting  Prep: ChloraPrep  Patient monitoring: cardiac monitor, continuous pulse ox and frequent blood pressure checks  Approach: midline  Location: L3/L4  Provider prep: mask and sterile gloves  Local infiltration: lidocaine  Needle  Needle type: Quincke   Needle gauge: 22 G  Needle length: 3.5 in  Assessment  Events: SAB placement uncomplicated.  No paresthesia.  Swirl obtained: Yes  CSF: clear  Attempts: 1  Hemodynamics: stable  Additional Notes  100 mcg Epinephrine added to spinal injection solution (0.1 mL of 1:1000 solution)  Preanesthetic Checklist  Completed: patient identified, IV checked, risks and benefits discussed, equipment checked, pre-op evaluation, timeout performed, anesthesia consent given, oxygen available and monitors applied/VS acknowledged          
abnormal findings.  Ultrasound images printed and placed on chart.    Medications Administered  ROPivacaine 0.2% with EPINEPHrine 1:200,000 injection (ANESTH) (Mixture components: EPINEPHrine PF 1 MG/ML Soln, 0.005 mL; ROPivacaine 0.2% Soln, 0.1 mL) - Perineural   20 mL - 6/11/2024 6:42:00 AM  dexAMETHasone (DECADRON) injection 4 mg/mL - Perineural   4 mg - 6/11/2024 6:42:00 AM

## 2024-06-11 NOTE — ANESTHESIA PRE PROCEDURE
Department of Anesthesiology  Preprocedure Note       Name:  Debi Quiroz   Age:  88 y.o.  :  1935                                          MRN:  [id number]         Date:  2024      Surgeon: Surgeon(s):  Thomas Goins MD    Procedure: Procedure(s):  KNEE TOTAL ARTHROPLASTY    Medications prior to admission:   Prior to Admission medications    Medication Sig Start Date End Date Taking? Authorizing Provider   aspirin 81 MG EC tablet Take 1 tablet by mouth in the morning and 1 tablet in the evening.  Patient not taking: Reported on 2024 6/3/24   Thomas Goins MD   sennosides-docusate sodium (SENOKOT-S) 8.6-50 MG tablet Take 1 tablet by mouth daily 6/3/24   Thomas Goins MD   acetaminophen (TYLENOL) 325 MG tablet Take 3 tablets by mouth in the morning and 3 tablets at noon and 3 tablets in the evening.  Patient not taking: Reported on 2024 6/3/24   Thomas Goins MD   meloxicam (MOBIC) 15 MG tablet Take 1 tablet by mouth daily  Patient not taking: Reported on 2024 6/3/24   Thomas Goins MD   simvastatin (ZOCOR) 40 MG tablet Take 1 tablet by mouth every evening 24   Nahum Howell MD   temazepam (RESTORIL) 30 MG capsule Take 1 capsule by mouth. 3/14/24   Nahum Howell MD   oxyBUTYnin (DITROPAN-XL) 5 MG extended release tablet Take 1 tablet by mouth daily 23   Nahum Howell MD   omeprazole (PRILOSEC) 20 MG delayed release capsule TAKE 1 CAPSULE BY MOUTH TWICE DAILY 30 MINUTES TO 1 HOUR PRIOR TO MEALS    Nahum Howell MD   NIFEdipine (PROCARDIA XL) 30 MG extended release tablet Take 1 tablet by mouth daily    Nahum Howell MD   hydroCHLOROthiazide (HYDRODIURIL) 25 MG tablet Take 1 tablet by mouth daily    Nahum Howell MD   GINKGO BILOBA EXTRACT PO Take 120 mg by mouth daily    Nahum Howell MD   DULoxetine (CYMBALTA) 20 MG extended release capsule Take 1 capsule by mouth daily 24

## 2024-06-11 NOTE — PERIOP NOTE
MD Edgar at bedside with patient. Pt VS stable. Pain and nausea controlled at this time. Verbal signout per MD when PACU care is completed. Plan of care continues.

## 2024-06-11 NOTE — PROGRESS NOTES
OCCUPATIONAL THERAPY Initial Assessment, Daily Note, and PM      (Link to Caseload Tracking: OT Visit Days: 1  OT Orders   Time  OT Charge Capture  Rehab Caseload Tracker  Episode     Debi Quiroz is a 88 y.o. female   PRIMARY DIAGNOSIS: Osteoarthritis of right knee  Osteoarthritis of right knee [M17.11]  Arthritis of right knee [M17.11]  Procedure(s) (LRB):  KNEE TOTAL ARTHROPLASTY (Right)  Day of Surgery  Reason for Referral: Pain in Right Knee (M25.561)  Stiffness of Right Knee, Not elsewhere classified (M25.661)  Generalized Muscle Weakness (M62.81)  Other lack of cordination (R27.8)  Difficulty in walking, Not elsewhere classified (R26.2)  Other abnormalities of gait and mobility (R26.89)  Observation: Payor: [employer]ICARE / Plan: [employer] MEDICARE-ADVANTAGE PPO / Product Type: Medicare /     ASSESSMENT:     REHAB RECOMMENDATIONS:   Recommendation to date pending progress:  Setting:  No further skilled occupational therapy after discharge from hospital    Equipment:    Rolling Walker     ASSESSMENT:  Ms. Quiroz is s/p right TKA and presents with decreased independence with functional mobility and activities of daily living as compared to baseline level of function and safety. Patient would benefit from skilled Occupational Therapy to maximize independence and safety with self-care task and functional mobility.   Patient supine in bed, assisted with lower body dressing. She transferred to eob with min assist. She completed getting dressed and stood with contact guard assist. She took steps to recliner sat and was set up with all her needs. She plans to spend the night and discharge home tomorrow with assistance from her son. She was educated on ice machine and set up with her needs. Will follow.       [facility] AM-PAC™ “6 Clicks” Daily Activity Inpatient Short Form:     AM-PAC Daily Activity - Inpatient   How much help is needed for putting on and taking off regular lower body clothing?: A Little  How

## 2024-06-11 NOTE — OP NOTE
verifying the patient's name, MRN, , procedure to be performed, administration of pre incisional antibiotics, and operative extremity.  A midline incision was made medial to the tibial tubercle centered over patella taken down to the deep capsule of the knee. A medial parapatellar arthrotomy was performed. The fat pad was released. The patella was everted and the knee was flexed. The remnants of the anterior horn of the medial and lateral meniscus were removed as well as the ACL and PCL from the intercondylar notch. All distal protruding osteophytes were removed. We then identified the starting point on the distal femur based on preoperative templating and accessed the intramedullary canal of the femur, lavaged it and placed the intramedullary cutting guide. We cut the distal femur in the appropriate amount valgus per preoperative templating. The cut was verified and our attention was then turned to the tibia. The tibia was subluxed forward. It was cut using external medullary alignment perpendicular to the mechanical axis, removing bone to the base of the defect on the affected side.    The knee was then brought into full extension and our extension gap was assessed. Appropriate releases were performed in extension to form a rectangular flexion space and spacer block was placed securely into a rectangular extension space giving good collateral stability and recreation of our mechanical axis. The knee was then flexed to 90 degrees. The femur was appropriately sized based on posterior referencing. The flexion space was tensed at 90 degrees with a tensiometer. And the AP cutting block was pinned in the appropriate amount of external rotation based on a symmetric flexion gap. We then performed our anterior/posterior/champfer  and box cuts for the femur. The knee was then again tensed at 90 degrees with paddle lamina  and we removed the remnants of the posterior horns of the medial and lateral meniscus as  well as any posterior osteophyte. The tibia was then subluxed forward and sized. It was pinned and punched in the appropriate amount of external rotation and our mechanical alignment was verified using a drop vivian. The tibia was then pinned and punched, the trial femur was placed, the knee was reduced with trial insert and was taken through a range of motion and found to be stable in the varus/valgus plane 0-30 degrees of flexion and the AP plane at 90 degrees of flexion. Our attention was then turned to the patella. A symmetric resection was performed to recreate native patella height. All extraneous osteophyte and synovium was removed, the patella tracked centrally using the no thumbs technique. All trial components were removed. The distal femur was plugged with a cement restrictor of bone. The real components were opened on the back table. The bone ends were copiously lavaged and dried.Two packs of medium viscosity cement was mixed under vacuum condition to cement the tibia, femur and patella.  The cement was finger pressurized into the bony surface of the tibia.  The tibal tray was coated with cement and inserted into the tibia. All extraneous cement was removed.  The real poly was inserted.  The tibia was reduced under the femur.  Cement was finger pressurized onto the surface of the femur.  The femoral component was coated with cement.  The femoral component was impacted onto the femur and all extraneous cement was removed.  The patella surface was washed with pulse lavage and cement was finger pressurized onto the cut patella.  All extraneous cement was removed.  The knee was held in extension until the cement hardened. We had appropriate stability consistent with our intraoperative trialing and the patella track centrally. The knee was then copiously lavaged with 1L normal saline and dilute betadine.  The arthrotomy was  re-approximated at the superior and inferior pole of the patella using 1 vicryl.  The

## 2024-06-11 NOTE — PROGRESS NOTES
ACUTE PHYSICAL THERAPY GOALS:   (Developed with and agreed upon by patient and/or caregiver.)  GOALS (1-4 days):  (1.)Ms. Quiroz will move from supine to sit and sit to supine  in bed with STAND BY ASSIST.  (2.)Ms. Quiroz will transfer from bed to chair and chair to bed with MODIFIED INDEPENDENCE using the least restrictive device.  (3.)Ms. Quiroz will ambulate with STAND BY ASSIST for 300 feet with the least restrictive device.  (4.)Ms. Quiroz will ambulate up/down 14 steps with railing with CONTACT GUARD ASSIST.  (5.)Ms. Quiroz will increase right knee ROM to 0-90°.  ________________________________________________________________________________________________  PHYSICAL THERAPY: TOTAL KNEE ARTHROPLASTY Initial Assessment and PM  (Link to Caseload Tracking: PT Visit Days : 1  Acknowledge Orders  Time In/Out  PT Charge Capture  Rehab Caseload Tracker  Episode   Debi Quiroz is a 88 y.o. female   PRIMARY DIAGNOSIS: Osteoarthritis of right knee  Osteoarthritis of right knee [M17.11]  Arthritis of right knee [M17.11]  Procedure(s) (LRB):  KNEE TOTAL ARTHROPLASTY (Right)  Day of Surgery  Reason for Referral: Pain in Right Knee (M25.561)  Stiffness of Right Knee, Not elsewhere classified (M25.661)  Difficulty in walking, Not elsewhere classified (R26.2)  Observation: Payor: [employer]ARY[employer] D MEDICARE / Plan: AET MEDICARE-ADVANTAGE PPO / Product Type: Medicare /     REHAB RECOMMENDATIONS:   Recommendation to date pending progress:  Setting:  Home Health Therapy    Equipment:    None     RANGE OF MOTION:   Right Knee Flexion: R Knee Flexion (0-145): 60 (approximate)  Right Knee Extension: R Knee Extension (0): 10 (approximate)     GAIT: I Mod I S SBA CGA Min Mod Max Total  NT x2 Comments:   Level of Assistance [] [] [] [x] [] [] [] [] [] [] []            Weightbearing Status  wbat     Distance  140 feet    Gait Quality Antalgic, Decreased shania , Decreased step clearance, Decreased step length, and Decreased stance    DME  Shower/Tub: Tub/Shower unit  Bathroom Toilet: Standard  Bathroom Equipment: Shower chair, 3-in-1 commode  Home Equipment: Cane, Walker - Rolling  Receives Help From: Family  ADL Assistance: Independent  Homemaking Assistance: Independent  Ambulation Assistance: Independent  Transfer Assistance: Independent  Occupation: Retired    OBJECTIVE:     PAIN: VITAL SIGNS: LINES/DRAINS:   Pre Treatment: had pain meds before sugery         Post Treatment: sore but feels better from pain meds Vitals        Oxygen   On room air None    RESTRICTIONS/PRECAUTIONS:                       LOWER EXTREMITY GROSS EVALUATION:   RIGHT LE   Within Functional Limits   Abnormal   Comments   Strength [] [x]  S/p TKA   Range of Motion [] [x] AROM RLE (degrees)  R Knee Flexion (0-145): 60 (approximate)  R Knee Extension (0): 10 (approximate)S/p TKA      LEFT LE Within Functional Limits   Abnormal   Comments   Strength [x] []     Range of Motion [x] []       UPPER EXTREMITY GROSS EVALUATION:     Within Functional Limits   Abnormal   Comments   Strength [x] []    Range of Motion [x] []      SENSATION  Sensation [x]       COGNITION/  PERCEPTION: Intact Impaired (Comments):   Orientation [x]     Vision [x]     Hearing [x]     Cognition  [x]       MOBILITY: I Mod I S SBA CGA Min Mod Max Total  NT x2 Comments:   Bed Mobility    Rolling [] [] [] [] [] [] [] [] [] [] []    Supine to Sit [] [] [] [] [] [] [] [] [] [] []    Scooting [] [] [] [] [] [] [] [] [] [] []    Sit to Supine [] [] [] [] [] [] [] [] [] [] []    Transfers    Sit to Stand [] [] [] [x] [] [] [] [] [] [] []    Bed to Chair [] [] [] [] [] [] [] [] [] [] []    Stand to Sit [] [] [] [x] [] [] [] [] [] [] []    Stand Pivot [] [] [] [] [] [] [] [] [] [] []    Toilet [] [] [x] [] [] [] [] [] [] [] []     [] [] [] [] [] [] [] [] [] [] []    I=Independent, Mod I=Modified Independent, S=Supervision, SBA=Standby Assistance, CGA=Contact Guard Assistance,   Min=Minimal Assistance, Mod=Moderate

## 2024-06-11 NOTE — PROGRESS NOTES
4 Eyes Skin Assessment     NAME:  Debi Quiroz  YOB: 1935  MEDICAL RECORD NUMBER:  [id number]    The patient is being assessed for  Post-Op Surgical    I agree that at least one RN has performed a thorough Head to Toe Skin Assessment on the patient. ALL assessment sites listed below have been assessed.      Areas assessed by both nurses:    Head, Face, Ears, Shoulders, Back, Chest, Arms, Elbows, Hands, Sacrum. Buttock, Coccyx, Ischium, Legs. Feet and Heels, and Under Medical Devices         Does the Patient have a Wound? No noted wound(s)       Spencer Prevention initiated by RN: No  Wound Care Orders initiated by RN: No    Pressure Injury (Stage 3,4, Unstageable, DTI, NWPT, and Complex wounds) if present, place Wound referral order by RN under : No    New Ostomies, if present place, Ostomy referral order under : No     Nurse 1 eSignature: Electronically signed by Phoenix M Chimato, RN on 6/11/24 at 1:57 PM EDT    **SHARE this note so that the co-signing nurse can place an eSignature**    Nurse 2 eSignature: Electronically signed by Raven Marquez RN on 6/11/24 at 1:59 PM EDT

## 2024-06-11 NOTE — CARE COORDINATION
Pt s/p right TKA. She lives with her son and dtr-in law. Patient met earlier with SW prior to PT della and requested to go to rehab at \"[place]\" as she did not want to be a burden on her son. Patient did well with her initial therapy- so I met with her again. She has spoken with her son and now is agreeable to go home with home health. Rehab plans are cancelled.  Pt w/o preference towards  provider. Referral sent to [facility]. Patient denies any equipment needs as she has several walkers.      06/11/24 1523   Services At/After Discharge   Transition of Care Consult (CM Consult) Home Health   Internal Home Health Yes   Services At/After Discharge Home Health;PT   Mode of Transport at Discharge Self   Confirm Follow Up Transport Self   Condition of Participation: Discharge Planning   The Plan for Transition of Care is related to the following treatment goals: improve mobility   The Patient and/or Patient Representative was provided with a Choice of Provider? Patient   The Patient and/Or Patient Representative agree with the Discharge Plan? Yes   Freedom of Choice list was provided with basic dialogue that supports the patient's individualized plan of care/goals, treatment preferences, and shares the quality data associated with the providers?  Yes

## 2024-06-11 NOTE — ANESTHESIA POSTPROCEDURE EVALUATION
Department of Anesthesiology  Postprocedure Note    Patient: Debi Quiroz  MRN: [id number]  YOB: 1935  Date of evaluation: 6/11/2024    Procedure Summary       Date: 06/11/24 [place] [facility] [place] 06 / [facility] [place]    Anesthesia Start: 0652 Anesthesia Stop: 0929    Procedure: KNEE TOTAL ARTHROPLASTY (Right: Knee) Diagnosis:       Osteoarthritis of right knee      (Osteoarthritis of right knee [M17.11])    Surgeons: Thomas Goins MD Responsible Provider: Mohan Edgar MD    Anesthesia Type: Spinal ASA Status: 3            Anesthesia Type: Spinal    Joshua Phase I: Joshua Score: 10    Joshua Phase II:      Anesthesia Post Evaluation    Patient location during evaluation: PACU  Patient participation: complete - patient participated  Level of consciousness: awake  Airway patency: patent  Nausea & Vomiting: no nausea and no vomiting  Cardiovascular status: blood pressure returned to baseline  Respiratory status: acceptable  Hydration status: euvolemic  Comments: --------------------------------------------------------------------------            06/11/24 06/11/24 06/11/24 06/11/24                   1030         1035             1102             1158         --------------------------------------------------------------------------   BP:     (!) 111/94   (!) 114/103      (!) 120/92       (!) 144/91       Pulse:      68           69               65               74           Resp:       15           16               16               16           Temp:             97.6 °F (36.4 °C)97.2 °F (36.2 °C)97.2 °F (36.2 °C)   TempSrc:              Infrared                          Temporal        SpO2:      94%           93%              91%              90%          Weight:                                                                 Height:

## 2024-06-11 NOTE — PERIOP NOTE
TRANSFER - OUT REPORT:    Verbal report given to Phoenix RN on Debi Quiroz  being transferred to [facility] for routine progression of patient care       Report consisted of patient's Situation, Background, Assessment and   Recommendations(SBAR).     Information from the following report(s) Nurse Handoff Report, Adult Overview, and MAR was reviewed with the receiving nurse.           Lines:   Peripheral IV 06/11/24 Left;Posterior;Proximal Forearm (Active)   Site Assessment Clean, dry & intact 06/11/24 1035   Line Status Infusing 06/11/24 1035   Line Care Connections checked and tightened 06/11/24 1035   Phlebitis Assessment No symptoms 06/11/24 1035   Infiltration Assessment 0 06/11/24 1035   Dressing Status Clean, dry & intact 06/11/24 1035   Dressing Type Transparent 06/11/24 1035        Opportunity for questions and clarification was provided.      Patient transported with:  O2 @ 0lpm

## 2024-06-12 ENCOUNTER — TELEPHONE (OUTPATIENT)
Dept: ORTHOPEDIC SURGERY | Age: 89
End: 2024-06-12

## 2024-06-12 VITALS
SYSTOLIC BLOOD PRESSURE: 119 MMHG | HEART RATE: 90 BPM | DIASTOLIC BLOOD PRESSURE: 76 MMHG | BODY MASS INDEX: 31.43 KG/M2 | TEMPERATURE: 98.6 F | WEIGHT: 170.8 LBS | HEIGHT: 62 IN | OXYGEN SATURATION: 95 % | RESPIRATION RATE: 16 BRPM

## 2024-06-12 PROCEDURE — 97530 THERAPEUTIC ACTIVITIES: CPT

## 2024-06-12 PROCEDURE — 2700000000 HC OXYGEN THERAPY PER DAY

## 2024-06-12 PROCEDURE — G0378 HOSPITAL OBSERVATION PER HR: HCPCS

## 2024-06-12 PROCEDURE — 97535 SELF CARE MNGMENT TRAINING: CPT

## 2024-06-12 PROCEDURE — 6370000000 HC RX 637 (ALT 250 FOR IP): Performed by: ORTHOPAEDIC SURGERY

## 2024-06-12 PROCEDURE — 96374 THER/PROPH/DIAG INJ IV PUSH: CPT

## 2024-06-12 PROCEDURE — 94760 N-INVAS EAR/PLS OXIMETRY 1: CPT

## 2024-06-12 PROCEDURE — 6360000002 HC RX W HCPCS: Performed by: ORTHOPAEDIC SURGERY

## 2024-06-12 RX ORDER — PANTOPRAZOLE SODIUM 40 MG/1
40 TABLET, DELAYED RELEASE ORAL
Status: DISCONTINUED | OUTPATIENT
Start: 2024-06-12 | End: 2024-06-12 | Stop reason: HOSPADM

## 2024-06-12 RX ORDER — TRAMADOL HYDROCHLORIDE 50 MG/1
50 TABLET ORAL EVERY 4 HOURS PRN
Qty: 30 TABLET | Refills: 0
Start: 2024-06-12 | End: 2024-06-17

## 2024-06-12 RX ORDER — OXYCODONE HYDROCHLORIDE 5 MG/1
5 TABLET ORAL
Qty: 30 TABLET | Refills: 0
Start: 2024-06-12 | End: 2024-06-15

## 2024-06-12 RX ADMIN — DULOXETINE 20 MG: 20 CAPSULE, DELAYED RELEASE ORAL at 07:59

## 2024-06-12 RX ADMIN — PANTOPRAZOLE SODIUM 40 MG: 40 TABLET, DELAYED RELEASE ORAL at 08:17

## 2024-06-12 RX ADMIN — SENNOSIDES AND DOCUSATE SODIUM 1 TABLET: 50; 8.6 TABLET ORAL at 07:59

## 2024-06-12 RX ADMIN — ONDANSETRON 4 MG: 4 TABLET, ORALLY DISINTEGRATING ORAL at 07:52

## 2024-06-12 RX ADMIN — OXYBUTYNIN CHLORIDE 5 MG: 5 TABLET, EXTENDED RELEASE ORAL at 08:00

## 2024-06-12 RX ADMIN — OXYCODONE 10 MG: 5 TABLET ORAL at 03:00

## 2024-06-12 RX ADMIN — ACETAMINOPHEN 1000 MG: 500 TABLET, FILM COATED ORAL at 06:12

## 2024-06-12 RX ADMIN — ASPIRIN 81 MG: 81 TABLET, COATED ORAL at 07:57

## 2024-06-12 RX ADMIN — HYDROCHLOROTHIAZIDE 25 MG: 25 TABLET ORAL at 07:59

## 2024-06-12 RX ADMIN — OXYCODONE 10 MG: 5 TABLET ORAL at 06:14

## 2024-06-12 RX ADMIN — TIMOLOL MALEATE 1 DROP: 5 SOLUTION OPHTHALMIC at 09:50

## 2024-06-12 RX ADMIN — ATORVASTATIN CALCIUM 20 MG: 10 TABLET, FILM COATED ORAL at 07:59

## 2024-06-12 RX ADMIN — KETOROLAC TROMETHAMINE 15 MG: 15 INJECTION, SOLUTION INTRAMUSCULAR; INTRAVENOUS at 06:12

## 2024-06-12 RX ADMIN — GABAPENTIN 400 MG: 400 CAPSULE ORAL at 07:59

## 2024-06-12 ASSESSMENT — PAIN DESCRIPTION - LOCATION
LOCATION: KNEE
LOCATION: LEG
LOCATION: KNEE

## 2024-06-12 ASSESSMENT — PAIN DESCRIPTION - DESCRIPTORS
DESCRIPTORS: ACHING;THROBBING
DESCRIPTORS: ACHING
DESCRIPTORS: ACHING;THROBBING

## 2024-06-12 ASSESSMENT — PAIN DESCRIPTION - ORIENTATION
ORIENTATION: RIGHT
ORIENTATION: RIGHT
ORIENTATION: RIGHT;LOWER

## 2024-06-12 ASSESSMENT — PAIN - FUNCTIONAL ASSESSMENT
PAIN_FUNCTIONAL_ASSESSMENT: PREVENTS OR INTERFERES SOME ACTIVE ACTIVITIES AND ADLS
PAIN_FUNCTIONAL_ASSESSMENT: PREVENTS OR INTERFERES SOME ACTIVE ACTIVITIES AND ADLS

## 2024-06-12 ASSESSMENT — PAIN SCALES - GENERAL
PAINLEVEL_OUTOF10: 8
PAINLEVEL_OUTOF10: 7
PAINLEVEL_OUTOF10: 3
PAINLEVEL_OUTOF10: 3

## 2024-06-12 NOTE — PLAN OF CARE
Problem: Pain  Goal: Verbalizes/displays adequate comfort level or baseline comfort level  6/12/2024 1033 by Farhad Meade RN  Outcome: Adequate for Discharge  6/12/2024 0903 by Farhad Meade RN  Outcome: Adequate for Discharge  6/11/2024 2155 by Micki Burdick RN  Outcome: Progressing     Problem: Safety - Adult  Goal: Free from fall injury  6/12/2024 1033 by Farhad Meade RN  Outcome: Adequate for Discharge  6/12/2024 0903 by Farhad Meade RN  Outcome: Adequate for Discharge  6/11/2024 2155 by Micki Burdick RN  Outcome: Progressing     Problem: Discharge Planning  Goal: Discharge to home or other facility with appropriate resources  6/12/2024 1033 by Farhad Meade RN  Outcome: Adequate for Discharge  6/12/2024 0903 by Farhad Meade RN  Outcome: Adequate for Discharge  6/11/2024 2155 by Micki Burdick RN  Outcome: Progressing     Problem: ABCDS Injury Assessment  Goal: Absence of physical injury  6/12/2024 1033 by Farhad Meade RN  Outcome: Adequate for Discharge  6/12/2024 0903 by Farhad Meade RN  Outcome: Adequate for Discharge  6/11/2024 2155 by Micki Burdick RN  Outcome: Progressing     Discharge paperwork provided and reviewed with patient regarding medications, follow up appointments, post-op wound care, etc. She verbalizes understanding and has no further questions at this time. Ready for discharge.

## 2024-06-12 NOTE — PROGRESS NOTES
06/11/24 1959   Treatment   Treatment Type IS   Oxygen Therapy/Pulse Ox   O2 Device None (Room air)   Pulse 83   Respirations 18   SpO2 92 %   Pulse Oximeter Device Mode Continuous   Pulse Oximeter Device Location Left;Finger   Pulse Oximeter Low SpO2 Alarm 89   Pulse Oximeter High SpO2 Alarm 100   $Pulse Oximeter $Spot check (multiple/continuous)   Incentive Spirometry Tx   Treatment Effort Assisted by RT;Well   Predicted Volume 1350   Achieved Volume (mL) 1500 mL     Patient placed on continuous sat monitor with alarms set per protocol. (%).  Monitor history deleted prior to placing on patient. Patient working on IS achieving 1500 ml. Very good effort, technique.

## 2024-06-12 NOTE — PROGRESS NOTES
OCCUPATIONAL THERAPY Daily Note, Discharge, and AM      (Link to Caseload Tracking: OT Visit Days: 1  OT Orders   Time  OT Charge Capture  Rehab Caseload Tracker  Episode     Debi Quiroz is a 88 y.o. female   PRIMARY DIAGNOSIS: Osteoarthritis of right knee  Osteoarthritis of right knee [M17.11]  Arthritis of right knee [M17.11]  Procedure(s) (LRB):  KNEE TOTAL ARTHROPLASTY (Right)  1 Day Post-Op  Reason for Referral: Pain in Right Knee (M25.561)  Stiffness of Right Knee, Not elsewhere classified (M25.661)  Generalized Muscle Weakness (M62.81)  Other lack of cordination (R27.8)  Difficulty in walking, Not elsewhere classified (R26.2)  Other abnormalities of gait and mobility (R26.89)  Observation: Payor: [employer] / Plan: AETMARIA D MEDICARE-ADVANTAGE PPO / Product Type: Medicare /     ASSESSMENT:     REHAB RECOMMENDATIONS:   Recommendation to date pending progress:  Setting:  No further skilled occupational therapy after discharge from hospital    Equipment:    Rolling Walker     ASSESSMENT:    Ms. Quiroz is s/p right TKA.  Patient completed shower and dressing as charted below in ADL grid and is ambulating with rolling walker and stand by assist.  Patient has met 4/4 goals and plans to return home with good family support.  Family able to provide patient with appropriate level of assistance at this time.  OT reviewed safety precautions throughout session and therapy schedule for the remainder of today.  Patient instructed to call for assistance when needing to get up from recliner and all needs in reach.  Patient verbalized understanding of call light.    She was sitting up in recliner. She ambulated to the bathroom and toileted with stand by assist. She transferred to shower and bathed with min for distal LE. She dried and dressed with min assist. She stood at the sink and brushed her teeth with supervision. She returned to recliner and was set up with her needs. She plans to discharge home today with good  OCCUPATIONAL THERAPY GOALS:   (Developed with and agreed upon by patient and/or caregiver.)    GOALS:   DISCHARGE GOALS (in preparation for going home/rehab):  3 days  1.  Ms. Quiroz will perform lower body dressing activity with minimal assist required to demonstrate improved functional mobility and safety.   -GOAL MET 6/11/24     2.  Ms. Quiroz will perform bathing activity with minimal assist required to demonstrate improved functional mobility and safety.-GOAL MET 6/12/24     3.  Ms. Quiroz will perform toileting activity with  contact guard assist to demonstrate improved functional mobility and safety.-GOAL MET 6/12/24     4.  Ms. Quiroz will perform all functional transfers transfer with contact guard assist to demonstrate improved functional mobility and safety.-GOAL MET 6/12/24         PROBLEM LIST:   (Skilled intervention is medically necessary to address:)  Decreased ADL/Functional Activities  Decreased Activity Tolerance  Decreased AROM/PROM  Decreased Balance  Decreased Coordination  Decreased Gait Ability  Decreased Safety Awareness  Decreased Strength  Decreased Transfer Abilities  Increased Pain   INTERVENTIONS PLANNED:   (Benefits and precautions of occupational therapy have been discussed with the patient.)  Self Care Training  Therapeutic Activity  Therapeutic Exercise/HEP  Neuromuscular Re-education  Manual Therapy  Education         TREATMENT:        TREATMENT:   SELF CARE: (40 minutes):   Procedure(s) (per grid) utilized to improve and/or restore self-care/home management as related to dressing, bathing, toileting, grooming, and functional mobility  . Required minimal visual, verbal, manual, and tactile cueing to facilitate activities of daily living skills, compensatory activities, and   .OT plan of care, discharge planning, adl task performance, post op showering, resting joint position, ice machine, walker use and home safety.          AFTER TREATMENT PRECAUTIONS: Bed/Chair Locked, Call light

## 2024-06-12 NOTE — PROGRESS NOTES
[facility] Progress Note    NAME: Debi Quiroz  : 1935  MRN: [id number]  DATE: 2024  POD: 1 Day Post-Op  S/P: right total knee arthroplasty    SUBJECTIVE:  No acute events overnight. No new complaints this morning. Denies nausea/vomiting,  headache, chest pain or shortness of breath.  Would like omeprazole    Recent Labs     24  0620   K 3.9         PHYSICAL EXAM:  Pt is AAOx3. No acute distress  right Lower Extremity  Aquacel dressing clean, dry, and intact  Incision not visualized  Motor function intact EHL/TA, GS/PT/FHL  SILT s/s/sp/dp/t distributions  Palpable DP/PT pulses. Foot WWP    ASSESSMENT:  88 y.o.  female   s/p right TKA on 24 doing well postoperatively    PLAN:  - Regular diet  - Hemodynamically stable  - Perioperative IV antibiotics x 24 hours  - Postoperative xrays reviewed and without complication  - pantoprazole prescribed  - WBAT RLE, mobilize with PT/OT  - DVT prophylaxis with ASA BID x 30 days and SCDs  - Dispo planning: Anticipate discharge home pending PT/OT clearance  - Follow up in clinic in 2 weeks for a wound check      Thomas Goins MD,2024, 8:01 AM

## 2024-06-12 NOTE — DISCHARGE INSTRUCTIONS
[facility]      Patient Discharge Instructions    Brandi Crain / [id number] : 1951    Admitted 2024 Discharged: 2024     IF YOU HAVE ANY PROBLEMS ONCE YOU ARE AT HOME CALL THE FOLLOWING NUMBERS:   Main office number: [phone]      Medications    The medications you are to continue on are listed on the medication reconciliation sheet.   Narcotic pain medications as well as supplemental iron can cause constipation. If this occurs try stopping the narcotic pain medication and/or the iron.   It is important that you take the medication exactly as they are prescribed.  Medications which increase your risk of blood clots are listed to stop for 5 weeks after surgery as well as medications or supplements which increase your risk of bleeding complications.   Keep your medication in the bottles provided by the pharmacist and keep a list of the medication names, dosages, and times to be taken in your wallet.   Do not take other medications without consulting your doctor.       Important Information    Do NOT smoke as this will greatly increase your risk of infection!    Resume your prehospital diet. If you have excessive nausea or vomitting call your doctor's office     Leg swelling and warmth is normal for 6 months after surgery. If you experience swelling in your leg elevate you leg while laying down with your toes above your heart. If you have sudden onset severe swelling with leg pain call our office. Use Tyler Hose stockings until we see you in the office for your follow up appointment.    The stitches deep inside take approximately 6 months to dissolve. There will be sharp shooting, stinging and burning pain. This is normal and will resolve between 3-6 months after surgery.     Difficulty sleeping is normal following total Knee and Hip replacement. You may try melatonin, an over-the-counter sleep aid or benadryl to help with sleep. Most patients will resume sleeping through the night 8  does not get better after you take pain medicine.   Watch closely for changes in your health, and be sure to contact your doctor if:    You do not have a bowel movement after taking a laxative.   Where can you learn more?  Go to https://www.my[employer].net/patientEd and enter T054 to learn more about \"Total Knee Replacement: What to Expect at Home.\"  Current as of: July 17, 2023  Content Version: 14.1  © 2006-2024 [employer].   Care instructions adapted under license by [employer]. If you have questions about a medical condition or this instruction, always ask your healthcare professional. [employer] disclaims any warranty or liability for your use of this information.        Information obtained by :  I understand that if any problems occur once I am at home I am to contact my physician.    I understand and acknowledge receipt of the instructions indicated above.                                                                                                                                           Physician's or R.N.'s Signature                                                                  Date/Time                                                                                                                                              Patient or Representative Signature                                                          Date/Time

## 2024-06-12 NOTE — PROGRESS NOTES
ACUTE PHYSICAL THERAPY GOALS:   (Developed with and agreed upon by patient and/or caregiver.)  GOALS (1-4 days):  (1.)Ms. Quiroz will move from supine to sit and sit to supine  in bed with STAND BY ASSIST. -GOAL MET 6/12/24    (2.)Ms. Quiroz will transfer from bed to chair and chair to bed with MODIFIED INDEPENDENCE using the least restrictive device.-GOAL MET 6/12/24    (3.)Ms. Quiroz will ambulate with STAND BY ASSIST for 300 feet with the least restrictive device.-GOAL MET 6/12/24    (4.)Ms. Quiroz will ambulate up/down 14 steps with railing with CONTACT GUARD ASSIST.  (5.)Ms. Quiroz will increase right knee ROM to 0-90°.1/2 met  ________________________________________________________________________________________________  PHYSICAL THERAPY: TOTAL KNEE ARTHROPLASTY Daily Note and AM  (Link to Caseload Tracking: PT Visit Days : 2  Acknowledge Orders  Time In/Out  PT Charge Capture  Rehab Caseload [id number]  Episode   Debi Quiroz is a 88 y.o. female   PRIMARY DIAGNOSIS: Osteoarthritis of right knee  Osteoarthritis of right knee [M17.11]  Arthritis of right knee [M17.11]  Procedure(s) (LRB):  KNEE TOTAL ARTHROPLASTY (Right)  1 Day Post-Op  Reason for Referral: Pain in Right Knee (M25.561)  Stiffness of Right Knee, Not elsewhere classified (M25.661)  Difficulty in walking, Not elsewhere classified (R26.2)  Observation: Payor: MARYTMARIA D MEDICARE / Plan: AETMARIA D MEDICARE-ADVANTAGE PPO / Product Type: Medicare /     REHAB RECOMMENDATIONS:   Recommendation to date pending progress:  Setting:  Home Health Therapy    Equipment:    None     RANGE OF MOTION:   Right Knee Flexion: R Knee Flexion (0-145): 60 (approximate)  Right Knee Extension: R Knee Extension (0): 10 (approximate)     GAIT: I Mod I S SBA CGA Min Mod Max Total  NT x2 Comments:   Level of Assistance [] [] [] [x] [] [] [] [] [] [] []            Weightbearing Status  wbat     Distance  150 (+ 150) feet    Gait Quality Antalgic, Decreased shania , Decreased step  Motion [x] []       UPPER EXTREMITY GROSS EVALUATION:     Within Functional Limits   Abnormal   Comments   Strength [x] []    Range of Motion [x] []      SENSATION  Sensation [x]       COGNITION/  PERCEPTION: Intact Impaired (Comments):   Orientation [x]     Vision [x]     Hearing [x]     Cognition  [x]       MOBILITY: I Mod I S SBA CGA Min Mod Max Total  NT x2 Comments:   Bed Mobility    Rolling [] [] [x] [] [] [] [] [] [] [] []    Supine to Sit [] [] [x] [] [] [] [] [] [] [] []    Scooting [] [] [x] [] [] [] [] [] [] [] []    Sit to Supine [] [] [] [] [] [] [] [] [] [] []    Transfers    Sit to Stand [] [] [] [x] [] [] [] [] [] [] []    Bed to Chair [] [] [] [] [] [] [] [] [] [] []    Stand to Sit [] [] [] [x] [] [] [] [] [] [] []    Stand Pivot [] [] [] [] [] [] [] [] [] [] []    Toilet [] [] [x] [] [] [] [] [] [] [] []     [] [] [] [] [] [] [] [] [] [] []    I=Independent, Mod I=Modified Independent, S=Supervision, SBA=Standby Assistance, CGA=Contact Guard Assistance,   Min=Minimal Assistance, Mod=Moderate Assistance, Max=Maximal Assistance, Total=Total Assistance, NT=Not Tested    BALANCE: Good Fair+ Fair Fair- Poor NT Comments   Sitting Static [x] [] [] [] [] []    Sitting Dynamic [x] [] [] [] [] []              Standing Static [x] [] [] [] [] []    Standing Dynamic [] [] [x] [] [] []      PLAN:   FREQUENCY AND DURATION: BID for duration of hospital stay or until stated goals are met, whichever comes first.    THERAPY PROGNOSIS: Good    PROBLEM LIST:   (Skilled intervention is medically necessary to address:)  Decreased ADL/Functional Activities, Decreased Activity Tolerance, Decreased AROM/PROM, Decreased Gait Ability, Decreased Strength, Decreased Transfer Abilities, and Increased Pain   INTERVENTIONS PLANNED:   (Benefits and precautions of physical therapy have been discussed with the patient.)  Therapeutic Activity, Therapeutic Exercise/HEP, Gait Training, Modalities, and Education       TREATMENT:

## 2024-06-12 NOTE — PROGRESS NOTES
ACUTE PHYSICAL THERAPY GOALS:   (Developed with and agreed upon by patient and/or caregiver.)  GOALS (1-4 days):  (1.)Ms. Quiroz will move from supine to sit and sit to supine  in bed with STAND BY ASSIST. -GOAL MET 6/12/24    (2.)Ms. Quiroz will transfer from bed to chair and chair to bed with MODIFIED INDEPENDENCE using the least restrictive device.-GOAL MET 6/12/24    (3.)Ms. Quiroz will ambulate with STAND BY ASSIST for 300 feet with the least restrictive device.-GOAL MET 6/12/24    (4.)Ms. Quiroz will ambulate up/down 14 steps with railing with CONTACT GUARD ASSIST. -GOAL MET 6/12/24    (5.)Ms. Quiroz will increase right knee ROM to 0-90°.1/2 met  ________________________________________________________________________________________________  PHYSICAL THERAPY: TOTAL KNEE ARTHROPLASTY Daily Note and AM mid morning  (Link to Caseload Tracking: PT Visit Days : 2  Acknowledge Orders  Time In/Out  PT Charge Capture  Rehab Caseload [id number]  Episode   Debi Quiroz is a 88 y.o. female   PRIMARY DIAGNOSIS: Osteoarthritis of right knee  Osteoarthritis of right knee [M17.11]  Arthritis of right knee [M17.11]  Procedure(s) (LRB):  KNEE TOTAL ARTHROPLASTY (Right)  1 Day Post-Op  Reason for Referral: Pain in Right Knee (M25.561)  Stiffness of Right Knee, Not elsewhere classified (M25.661)  Difficulty in walking, Not elsewhere classified (R26.2)  Observation: Payor: [employer]ICARE / Plan: [employer] MEDICARE-ADVANTAGE PPO / Product Type: Medicare /     REHAB RECOMMENDATIONS:   Recommendation to date pending progress:  Setting:  Home Health Therapy    Equipment:    None     RANGE OF MOTION:   Right Knee Flexion: R Knee Flexion (0-145): 60 (approximate)  Right Knee Extension: R Knee Extension (0): 10 (approximate)     GAIT: I Mod I S SBA CGA Min Mod Max Total  NT x2 Comments:   Level of Assistance [] [] [] [x] [] [] [] [] [] [] []            Weightbearing Status  wbat     Distance  202 (+ 202) feet    Gait Quality Antalgic,

## 2024-06-14 ENCOUNTER — HOME CARE VISIT (OUTPATIENT)
Dept: SCHEDULING | Facility: HOME HEALTH | Age: 89
End: 2024-06-14
Payer: MEDICARE

## 2024-06-14 VITALS
DIASTOLIC BLOOD PRESSURE: 84 MMHG | SYSTOLIC BLOOD PRESSURE: 122 MMHG | TEMPERATURE: 97.3 F | OXYGEN SATURATION: 92 % | RESPIRATION RATE: 16 BRPM | HEART RATE: 84 BPM

## 2024-06-14 PROCEDURE — G0151 HHCP-SERV OF PT,EA 15 MIN: HCPCS

## 2024-06-14 ASSESSMENT — ENCOUNTER SYMPTOMS
PAIN LOCATION - PAIN QUALITY: ACHING
NAUSEA: 1
VOMITING: 1
CONSTIPATION: 1
DYSPNEA ACTIVITY LEVEL: AFTER AMBULATING MORE THAN 20 FT

## 2024-06-17 ENCOUNTER — HOME CARE VISIT (OUTPATIENT)
Dept: SCHEDULING | Facility: HOME HEALTH | Age: 89
End: 2024-06-17
Payer: MEDICARE

## 2024-06-17 ENCOUNTER — TELEPHONE (OUTPATIENT)
Dept: ORTHOPEDIC SURGERY | Age: 89
End: 2024-06-17

## 2024-06-17 VITALS
DIASTOLIC BLOOD PRESSURE: 78 MMHG | RESPIRATION RATE: 18 BRPM | SYSTOLIC BLOOD PRESSURE: 128 MMHG | OXYGEN SATURATION: 94 % | TEMPERATURE: 98.1 F | HEART RATE: 87 BPM

## 2024-06-17 DIAGNOSIS — M17.11 ARTHRITIS OF RIGHT KNEE: Primary | ICD-10-CM

## 2024-06-17 PROCEDURE — G0151 HHCP-SERV OF PT,EA 15 MIN: HCPCS

## 2024-06-17 ASSESSMENT — ENCOUNTER SYMPTOMS: PAIN LOCATION - PAIN QUALITY: SORE, ACHY

## 2024-06-17 NOTE — TELEPHONE ENCOUNTER
Her daughter in law Cindy is calling to see about getting her into a rehab facility. She is home from surgery and having a lot of complications.

## 2024-06-17 NOTE — TELEPHONE ENCOUNTER
Spoke with daughter in law, Cindy, who states that she is impacted and is having a hard time with a bowel movement. She does not complain of any knee pain. She states that she has taken a stool softener, increased her fiber, prune juice and a enema and still not able to go. She has tried aloe vera fiber plant also. She is wanted to know if she get can home health to come out as well. Cindy is aware that I will check with Dr. Goins to see what he says. Cindy verbalized understanding and voiced no other concerns at this time.

## 2024-06-18 NOTE — TELEPHONE ENCOUNTER
Spoke with daughter, Cindy, who states that the patient has had a bowel movement and doing much better today.

## 2024-06-19 ENCOUNTER — HOME CARE VISIT (OUTPATIENT)
Dept: SCHEDULING | Facility: HOME HEALTH | Age: 89
End: 2024-06-19
Payer: MEDICARE

## 2024-06-19 VITALS
HEART RATE: 72 BPM | OXYGEN SATURATION: 91 % | RESPIRATION RATE: 18 BRPM | SYSTOLIC BLOOD PRESSURE: 122 MMHG | TEMPERATURE: 98.4 F | DIASTOLIC BLOOD PRESSURE: 70 MMHG

## 2024-06-19 PROCEDURE — G0157 HHC PT ASSISTANT EA 15: HCPCS

## 2024-06-19 ASSESSMENT — ENCOUNTER SYMPTOMS: PAIN LOCATION - PAIN QUALITY: ACHING

## 2024-06-21 ENCOUNTER — HOME CARE VISIT (OUTPATIENT)
Dept: SCHEDULING | Facility: HOME HEALTH | Age: 89
End: 2024-06-21
Payer: MEDICARE

## 2024-06-21 VITALS
OXYGEN SATURATION: 92 % | TEMPERATURE: 98.4 F | DIASTOLIC BLOOD PRESSURE: 70 MMHG | HEART RATE: 76 BPM | SYSTOLIC BLOOD PRESSURE: 124 MMHG | RESPIRATION RATE: 16 BRPM

## 2024-06-21 PROCEDURE — G0157 HHC PT ASSISTANT EA 15: HCPCS

## 2024-06-21 ASSESSMENT — ENCOUNTER SYMPTOMS: PAIN LOCATION - PAIN QUALITY: ACHES

## 2024-06-24 ENCOUNTER — HOME CARE VISIT (OUTPATIENT)
Dept: SCHEDULING | Facility: HOME HEALTH | Age: 89
End: 2024-06-24
Payer: MEDICARE

## 2024-06-24 VITALS
DIASTOLIC BLOOD PRESSURE: 72 MMHG | TEMPERATURE: 97.9 F | OXYGEN SATURATION: 93 % | RESPIRATION RATE: 17 BRPM | HEART RATE: 84 BPM | SYSTOLIC BLOOD PRESSURE: 126 MMHG

## 2024-06-24 PROCEDURE — G0151 HHCP-SERV OF PT,EA 15 MIN: HCPCS

## 2024-06-24 ASSESSMENT — ENCOUNTER SYMPTOMS: PAIN LOCATION - PAIN QUALITY: ACHING

## 2024-06-26 ENCOUNTER — HOME CARE VISIT (OUTPATIENT)
Dept: SCHEDULING | Facility: HOME HEALTH | Age: 89
End: 2024-06-26
Payer: MEDICARE

## 2024-06-26 VITALS
DIASTOLIC BLOOD PRESSURE: 82 MMHG | HEART RATE: 80 BPM | OXYGEN SATURATION: 95 % | TEMPERATURE: 98.1 F | SYSTOLIC BLOOD PRESSURE: 126 MMHG | RESPIRATION RATE: 17 BRPM

## 2024-06-26 PROCEDURE — G0151 HHCP-SERV OF PT,EA 15 MIN: HCPCS

## 2024-06-26 ASSESSMENT — ENCOUNTER SYMPTOMS: PAIN LOCATION - PAIN QUALITY: ACHING

## 2024-06-27 ENCOUNTER — HOME CARE VISIT (OUTPATIENT)
Dept: SCHEDULING | Facility: HOME HEALTH | Age: 89
End: 2024-06-27
Payer: MEDICARE

## 2024-06-27 NOTE — CASE COMMUNICATION
Patient requests to attend Outpatient PT at [facility]. Please send referral so patient can be scheduled prior to  PT DC planned 7/10/24. Thank you.

## 2024-06-28 ENCOUNTER — OFFICE VISIT (OUTPATIENT)
Dept: ORTHOPEDIC SURGERY | Age: 89
End: 2024-06-28

## 2024-06-28 VITALS — HEIGHT: 62 IN | BODY MASS INDEX: 31.28 KG/M2 | WEIGHT: 170 LBS

## 2024-06-28 DIAGNOSIS — Z96.651 H/O TOTAL KNEE REPLACEMENT, RIGHT: Primary | ICD-10-CM

## 2024-06-28 PROCEDURE — 99024 POSTOP FOLLOW-UP VISIT: CPT | Performed by: ORTHOPAEDIC SURGERY

## 2024-06-28 RX ORDER — TRAMADOL HYDROCHLORIDE 50 MG/1
50 TABLET ORAL EVERY 6 HOURS PRN
Qty: 30 TABLET | Refills: 0 | Status: SHIPPED | OUTPATIENT
Start: 2024-06-28 | End: 2024-07-03

## 2024-06-28 NOTE — PROGRESS NOTES
Name: Debi Quiroz  YOB: 1935  Gender: female  MRN: [id number]      Current Outpatient Medications:     traMADol (ULTRAM) 50 MG tablet, Take 50 mg by mouth every 6 hours as needed for Pain., Disp: , Rfl:     aspirin 81 MG EC tablet, Take 1 tablet by mouth in the morning and 1 tablet in the evening., Disp: 70 tablet, Rfl: 0    sennosides-docusate sodium (SENOKOT-S) 8.6-50 MG tablet, Take 1 tablet by mouth daily, Disp: 30 tablet, Rfl: 1    acetaminophen (TYLENOL) 325 MG tablet, Take 3 tablets by mouth in the morning and 3 tablets at noon and 3 tablets in the evening., Disp: 60 tablet, Rfl: 2    meloxicam (MOBIC) 15 MG tablet, Take 1 tablet by mouth daily, Disp: 30 tablet, Rfl: 3    simvastatin (ZOCOR) 40 MG tablet, Take 1 tablet by mouth every evening, Disp: , Rfl:     temazepam (RESTORIL) 30 MG capsule, Take 1 capsule by mouth., Disp: , Rfl:     oxyBUTYnin (DITROPAN-XL) 5 MG extended release tablet, Take 1 tablet by mouth daily, Disp: , Rfl:     omeprazole (PRILOSEC) 20 MG delayed release capsule, TAKE 1 CAPSULE BY MOUTH TWICE DAILY 30 MINUTES TO 1 HOUR PRIOR TO MEALS, Disp: , Rfl:     NIFEdipine (PROCARDIA XL) 30 MG extended release tablet, Take 1 tablet by mouth daily, Disp: , Rfl:     hydroCHLOROthiazide (HYDRODIURIL) 25 MG tablet, Take 1 tablet by mouth daily, Disp: , Rfl:     GINKGO BILOBA EXTRACT PO, Take 120 mg by mouth daily, Disp: , Rfl:     DULoxetine (CYMBALTA) 20 MG extended release capsule, Take 1 capsule by mouth daily, Disp: , Rfl:     coenzyme Q10 100 MG CAPS capsule, Take 1 tablet by mouth daily, Disp: , Rfl:     benzonatate (TESSALON) 100 MG capsule, Take 1 capsule by mouth 3 times daily as needed, Disp: , Rfl:     Pyridoxine HCl (VITAMIN B-6 PO), 1 Tablet by mouth daily, Disp: , Rfl:     Cyanocobalamin (VITAMIN B-12 CR PO), 1 tablet by mouth daily. , Disp: , Rfl:     Multiple Vitamins-Minerals (OCUVITE PO), Take 1 capsule by mouth daily, Disp: , Rfl:

## 2024-07-01 ENCOUNTER — HOME CARE VISIT (OUTPATIENT)
Dept: SCHEDULING | Facility: HOME HEALTH | Age: 89
End: 2024-07-01
Payer: MEDICARE

## 2024-07-01 VITALS
OXYGEN SATURATION: 94 % | HEART RATE: 81 BPM | DIASTOLIC BLOOD PRESSURE: 60 MMHG | RESPIRATION RATE: 16 BRPM | SYSTOLIC BLOOD PRESSURE: 108 MMHG | TEMPERATURE: 98.4 F

## 2024-07-01 PROCEDURE — G0157 HHC PT ASSISTANT EA 15: HCPCS

## 2024-07-01 ASSESSMENT — ENCOUNTER SYMPTOMS: PAIN LOCATION - PAIN QUALITY: ACHES

## 2024-07-03 ENCOUNTER — HOME CARE VISIT (OUTPATIENT)
Dept: SCHEDULING | Facility: HOME HEALTH | Age: 89
End: 2024-07-03
Payer: MEDICARE

## 2024-07-03 ENCOUNTER — HOME CARE VISIT (OUTPATIENT)
Dept: HOME HEALTH SERVICES | Facility: HOME HEALTH | Age: 89
End: 2024-07-03
Payer: MEDICARE

## 2024-07-03 VITALS
HEART RATE: 86 BPM | RESPIRATION RATE: 16 BRPM | OXYGEN SATURATION: 90 % | TEMPERATURE: 98.2 F | SYSTOLIC BLOOD PRESSURE: 110 MMHG | DIASTOLIC BLOOD PRESSURE: 70 MMHG

## 2024-07-03 PROCEDURE — G0157 HHC PT ASSISTANT EA 15: HCPCS

## 2024-07-03 ASSESSMENT — ENCOUNTER SYMPTOMS: PAIN LOCATION - PAIN QUALITY: SORE

## 2024-07-08 ENCOUNTER — HOME CARE VISIT (OUTPATIENT)
Dept: HOME HEALTH SERVICES | Facility: HOME HEALTH | Age: 89
End: 2024-07-08
Payer: MEDICARE

## 2024-07-15 ENCOUNTER — HOME CARE VISIT (OUTPATIENT)
Dept: HOME HEALTH SERVICES | Facility: HOME HEALTH | Age: 89
End: 2024-07-15

## 2024-07-15 NOTE — CASE COMMUNICATION
Patient discharged [facility] and was admitted to [facility].  Discharge HH services.

## 2024-07-23 ENCOUNTER — HOME HEALTH ADMISSION (OUTPATIENT)
Dept: HOME HEALTH SERVICES | Facility: HOME HEALTH | Age: 89
End: 2024-07-23

## 2024-08-26 ENCOUNTER — OFFICE VISIT (OUTPATIENT)
Dept: ORTHOPEDIC SURGERY | Age: 89
End: 2024-08-26

## 2024-08-26 VITALS — HEIGHT: 62 IN | BODY MASS INDEX: 31.28 KG/M2 | WEIGHT: 170 LBS

## 2024-08-26 DIAGNOSIS — Z96.651 H/O TOTAL KNEE REPLACEMENT, RIGHT: Primary | ICD-10-CM

## 2024-08-26 PROCEDURE — 99024 POSTOP FOLLOW-UP VISIT: CPT | Performed by: ORTHOPAEDIC SURGERY

## 2024-08-26 RX ORDER — SIMVASTATIN 20 MG
20 TABLET ORAL NIGHTLY
COMMUNITY
Start: 2024-08-15

## 2024-08-26 RX ORDER — MULTIVITAMIN,THERAPEUTIC
1 TABLET ORAL DAILY
COMMUNITY
Start: 2024-07-18

## 2024-08-26 RX ORDER — FERROUS SULFATE 325(65) MG
325 TABLET ORAL DAILY
COMMUNITY
Start: 2024-07-18

## 2024-08-26 NOTE — PROGRESS NOTES
Name: Debi Quiroz  YOB: 1935  Gender: female  MRN: [id number]      Current Outpatient Medications:     simvastatin (ZOCOR) 20 MG tablet, Take 1 tablet by mouth nightly, Disp: , Rfl:     Multiple Vitamin (THERA/BETA-CAROTENE) TABS, Take 1 tablet by mouth daily, Disp: , Rfl:     ferrous sulfate (IRON 325) 325 (65 Fe) MG tablet, Take 1 tablet by mouth daily, Disp: , Rfl:     vitamin D-3 (CHOLECALCIFEROL) 125 MCG (5000 UT) TABS, Take 1,000 Units by mouth, Disp: , Rfl:     Multiple Vitamins-Minerals (PRESERVISION AREDS 2 PO), Take by mouth, Disp: , Rfl:     POTASSIUM GLUCONATE ER PO, Take by mouth, Disp: , Rfl:     MAGNESIUM OXIDE PO, by Oral/Gastric Tube route, Disp: , Rfl:     aspirin 81 MG EC tablet, Take 1 tablet by mouth in the morning and 1 tablet in the evening., Disp: 70 tablet, Rfl: 0    sennosides-docusate sodium (SENOKOT-S) 8.6-50 MG tablet, Take 1 tablet by mouth daily, Disp: 30 tablet, Rfl: 1    acetaminophen (TYLENOL) 325 MG tablet, Take 3 tablets by mouth in the morning and 3 tablets at noon and 3 tablets in the evening., Disp: 60 tablet, Rfl: 2    meloxicam (MOBIC) 15 MG tablet, Take 1 tablet by mouth daily, Disp: 30 tablet, Rfl: 3    temazepam (RESTORIL) 30 MG capsule, Take 1 capsule by mouth., Disp: , Rfl:     oxyBUTYnin (DITROPAN-XL) 5 MG extended release tablet, Take 1 tablet by mouth daily, Disp: , Rfl:     omeprazole (PRILOSEC) 20 MG delayed release capsule, TAKE 1 CAPSULE BY MOUTH TWICE DAILY 30 MINUTES TO 1 HOUR PRIOR TO MEALS, Disp: , Rfl:     NIFEdipine (PROCARDIA XL) 30 MG extended release tablet, Take 1 tablet by mouth daily, Disp: , Rfl:     hydroCHLOROthiazide (HYDRODIURIL) 25 MG tablet, Take 1 tablet by mouth daily, Disp: , Rfl:     GINKGO BILOBA EXTRACT PO, Take 120 mg by mouth daily, Disp: , Rfl:     DULoxetine (CYMBALTA) 20 MG extended release capsule, Take 1 capsule by mouth daily, Disp: , Rfl:     coenzyme Q10 100 MG CAPS capsule, Take 1 tablet by mouth daily, Disp: ,  Rfl:     Cyanocobalamin (VITAMIN B-12 CR PO), 1 tablet by mouth daily. , Disp: , Rfl:     Multiple Vitamins-Minerals (OCUVITE PO), Take 1 capsule by mouth daily, Disp: , Rfl:     Timolol-Brimon-Dorzol-Latanopr 0.5-0.15-2 -0.005% SOLN, 1 drops DAILY (route: ophthalmic (eye)), Disp: , Rfl:     Alpha-Lipoic Acid 600 MG CAPS, Take by mouth daily, Disp: , Rfl:     Calcium Carbonate-Vitamin D (CALCIUM-VITAMIN D) 600-125 MG-UNIT TABS, Take by mouth, Disp: , Rfl:     Cinnamon 500 MG CAPS, Take 1 capsule by mouth 2 times daily, Disp: , Rfl:     gabapentin (NEURONTIN) 800 MG tablet, Take 1 tablet by mouth 2 times daily., Disp: , Rfl:     potassium gluconate 550 mg tablet, Take 99 mg by mouth daily, Disp: , Rfl:     pyridoxine (B-6) 200 MG tablet, Take 2.5 tablets by mouth daily, Disp: , Rfl:     timolol (TIMOPTIC) 0.5 % ophthalmic solution, Place 1 drop into the left eye daily, Disp: , Rfl:   Allergies   Allergen Reactions    Chocolate     Sulfa Antibiotics Rash       Post-op right TKA     Patient returns for follow-up.  Overall she is doing well she has no issues she did fall out of bed and get her bed rail.  She has no pain and is ambulating with a walker she would like to use an assistive device    Exam  Dressing c/d/I removed  Incision c/d/I   Minimal swelling  Knee rom 0 to 120  Stable to varus valgus stress in extension  No increased shuck at 90 degree flexion  Fires ehl fhl ta gs p  Splt s/s/sp/dp/t     AP lateral sunrise view of the right knee demonstrate cemented total knee arthroplasty with resurfaced patella in appropriate position without evidence of hardware failure or loosening    Post-op right TKA DOS 6/11/24  She is doing excellent she can follow-up in 1 year.  She can transition away from the walker when she is able we did discuss that she needs a cane in the interim prior to using no assistive device to help prevent from falling.

## (undated) DEVICE — BNDG,ELSTC,MATRIX,STRL,6"X5YD,LF,HOOK&LP: Brand: MEDLINE

## (undated) DEVICE — STERILE PVP: Brand: MEDLINE INDUSTRIES, INC.

## (undated) DEVICE — SUTURE STRATAFIX SYMMETRIC SZ 1 L18IN ABSRB VLT CT1 L36CM SXPP1A404

## (undated) DEVICE — GLOVE SURG SZ 8 L12IN FNGR THK79MIL GRN LTX FREE

## (undated) DEVICE — SUTURE VICRYL + SZ 1 L36IN ABSRB UD L36MM CT-1 1/2 CIR VCP947H

## (undated) DEVICE — HOOD: Brand: T7PLUS

## (undated) DEVICE — DRESSING HYDROFIBER AQUACEL AG ADVANTAGE 3.5X12 IN

## (undated) DEVICE — COVER,MAYO STAND,XL,STERILE: Brand: MEDLINE

## (undated) DEVICE — DUAL CUT SAGITTAL BLADE

## (undated) DEVICE — APPLICATOR MEDICATED 26 CC SOLUTION HI LT ORNG CHLORAPREP

## (undated) DEVICE — SOLUTION IRRIG 3000ML 0.9% SOD CHL USP UROMATIC PLAS CONT

## (undated) DEVICE — DRAPE TWL SURG 16X26IN BLU ORB04] ALLCARE INC]

## (undated) DEVICE — TOTAL KNEE DR KAVOLUS: Brand: MEDLINE INDUSTRIES, INC.

## (undated) DEVICE — STERILE SYNTHETIC POLYISOPRENE POWDER-FREE SURGICAL GLOVES WITH HYDROGEL COATING, SMOOTH FINISH, STRAIGHT FINGER: Brand: PROTEXIS

## (undated) DEVICE — BANDAGE COMPR W6INXL12FT SMOOTH FOR LIMB EXSANG ESMARCH

## (undated) DEVICE — LIQUIBAND RAPID ADHESIVE 36/CS 0.8ML: Brand: MEDLINE

## (undated) DEVICE — SUTURE VICRYL SZ 2-0 L36IN ABSRB UD L36MM CT-1 1/2 CIR J945H

## (undated) DEVICE — SUTURE VICRYL PLU SIZE 1 L18N CRCLE TPR PNT PLGLCTN TRCLSN VLT